# Patient Record
Sex: MALE | Race: WHITE | NOT HISPANIC OR LATINO | Employment: OTHER | URBAN - METROPOLITAN AREA
[De-identification: names, ages, dates, MRNs, and addresses within clinical notes are randomized per-mention and may not be internally consistent; named-entity substitution may affect disease eponyms.]

---

## 2017-02-01 RX ORDER — SILODOSIN 4 MG/1
4 CAPSULE ORAL DAILY
Qty: 90 CAPSULE | Refills: 12 | Status: SHIPPED | OUTPATIENT
Start: 2017-02-01 | End: 2018-01-23

## 2017-02-06 ENCOUNTER — OFFICE VISIT (OUTPATIENT)
Dept: INTERNAL MEDICINE | Facility: CLINIC | Age: 82
End: 2017-02-06
Payer: MEDICARE

## 2017-02-06 VITALS
HEIGHT: 68 IN | HEART RATE: 60 BPM | BODY MASS INDEX: 24.13 KG/M2 | SYSTOLIC BLOOD PRESSURE: 112 MMHG | WEIGHT: 159.19 LBS | DIASTOLIC BLOOD PRESSURE: 68 MMHG

## 2017-02-06 DIAGNOSIS — E03.4 HYPOTHYROIDISM DUE TO ACQUIRED ATROPHY OF THYROID: Primary | ICD-10-CM

## 2017-02-06 DIAGNOSIS — G31.84 MCI (MILD COGNITIVE IMPAIRMENT) WITH MEMORY LOSS: ICD-10-CM

## 2017-02-06 PROCEDURE — 99999 PR PBB SHADOW E&M-EST. PATIENT-LVL III: CPT | Mod: PBBFAC,,, | Performed by: INTERNAL MEDICINE

## 2017-02-06 PROCEDURE — 1126F AMNT PAIN NOTED NONE PRSNT: CPT | Mod: S$GLB,,, | Performed by: INTERNAL MEDICINE

## 2017-02-06 PROCEDURE — 3074F SYST BP LT 130 MM HG: CPT | Mod: S$GLB,,, | Performed by: INTERNAL MEDICINE

## 2017-02-06 PROCEDURE — 1159F MED LIST DOCD IN RCRD: CPT | Mod: S$GLB,,, | Performed by: INTERNAL MEDICINE

## 2017-02-06 PROCEDURE — 99213 OFFICE O/P EST LOW 20 MIN: CPT | Mod: S$GLB,,, | Performed by: INTERNAL MEDICINE

## 2017-02-06 PROCEDURE — 1157F ADVNC CARE PLAN IN RCRD: CPT | Mod: S$GLB,,, | Performed by: INTERNAL MEDICINE

## 2017-02-06 PROCEDURE — 3078F DIAST BP <80 MM HG: CPT | Mod: S$GLB,,, | Performed by: INTERNAL MEDICINE

## 2017-02-06 PROCEDURE — 99499 UNLISTED E&M SERVICE: CPT | Mod: S$GLB,,, | Performed by: INTERNAL MEDICINE

## 2017-02-06 PROCEDURE — 1160F RVW MEDS BY RX/DR IN RCRD: CPT | Mod: S$GLB,,, | Performed by: INTERNAL MEDICINE

## 2017-02-06 NOTE — PROGRESS NOTES
Subjective:       Patient ID: Lavell Coyle is a 84 y.o. male.    Chief Complaint: Follow-up    HPI   Lavell Coyle is a 84 y.o.male with the following  has a past medical history of Allergic blepharitis (5/20/2014); Basal cell cancer; History of mononucleosis; History of osteopenia; Hypothyroidism; Melanoma; Osteopenia; Prostate hypertrophy; Thrombocytopenia (2002); Thrombocytopenia (2002); Thyroid disease; and Thyroid nodule (07/2009). and  has a past surgical history that includes Moh; Skin cancer excision; Rotator cuff repair; Carpal tunnel release; Knee surgery; and Knee arthroscopy. and is currently followed for   Patient Active Problem List   Diagnosis    BPH with urinary obstruction    Thyroid nodule    Osteopenia    Skin cancer    History of melanoma excision    Basal cell cancer    MCI (mild cognitive impairment) with memory loss    Hypothyroidism    Medial meniscus tear    S/P R knee surgery, medial/lateral menisectomy, loose body removal    Range of motion deficit    Decreased strength    Nuclear cataract    Ocular migraine    Blepharitis of both eyes    Bilateral dry eyes   .    Patient is here for a 6 month follow up. He states his memory is still an issue. He has no new problems    Review of Systems   Constitutional: Negative for appetite change, chills, fever and unexpected weight change.   Respiratory: Negative for shortness of breath and wheezing.    Cardiovascular: Negative for chest pain, palpitations and leg swelling.   Gastrointestinal: Negative for abdominal pain, blood in stool, diarrhea, nausea and vomiting.       Objective:      Physical Exam   Constitutional: He is oriented to person, place, and time. He appears well-developed and well-nourished. No distress.   Neck: Carotid bruit is not present. No thyromegaly present.   Cardiovascular: Normal rate, regular rhythm and normal heart sounds.  PMI is not displaced.    Pulmonary/Chest: Effort normal and breath sounds normal.  No respiratory distress.   Abdominal: Soft. Bowel sounds are normal. He exhibits no distension. There is no tenderness.   Musculoskeletal: He exhibits no edema.   Neurological: He is alert and oriented to person, place, and time.       Assessment:       1. Hypothyroidism due to acquired atrophy of thyroid    2. MCI (mild cognitive impairment) with memory loss        Plan:       Lavell was seen today for follow-up.    Diagnoses and all orders for this visit:    Hypothyroidism due to acquired atrophy of thyroid    MCI (mild cognitive impairment) with memory loss      Return in about 6 months (around 8/6/2017) for Annual with cbc, cmp, lipid, tsh.    New Prescriptions    No medications on file       Modified Medications    No medications on file       No orders of the defined types were placed in this encounter.      Labs, studies and consults associated with this visit were reviewed

## 2017-02-06 NOTE — MR AVS SNAPSHOT
[facility]  [address]  [facility] [address]  Phone: [phone]  Fax: [phone]                  Lavell Coyle   2017 2:00 PM   Office Visit    Description:  Male : 1932   Provider:  Rosalva Howard MD   Department:  [facility]           Reason for Visit     Follow-up           Diagnoses this Visit        Comments    Hypothyroidism due to acquired atrophy of thyroid    -  Primary     MCI (mild cognitive impairment) with memory loss                To Do List           Goals (5 Years of Data)     None      Follow-Up and Disposition     Return in about 6 months (around 2017) for Annual with cbc, cmp, lipid, tsh.      [facility]     [facility] Nurse Care Line -  Assistance  Registered nurses in the [facility] provide clinical advisement, health education, appointment booking, and other advisory services.  Call for this free service at [phone].             Medications           Message regarding Medications     Verify the changes and/or additions to your medication regime listed below are the same as discussed with your clinician today.  If any of these changes or additions are incorrect, please notify your healthcare provider.             Verify that the below list of medications is an accurate representation of the medications you are currently taking.  If none reported, the list may be blank. If incorrect, please contact your healthcare provider. Carry this list with you in case of emergency.           Current Medications     calcium citrate-vitamin D3 315-200 mg (CITRACAL+D) 315-200 mg-unit per tablet Take 1 tablet by mouth once daily.    erythromycin (ROMYCIN) ophthalmic ointment Place into both eyes every evening. Apply to lids the first week of the month.    finasteride (PROSCAR) 5 mg tablet Take 1 tablet (5 mg total) by mouth once daily. Only to be refill by Dr. [facility] per patient request     "levothyroxine (SYNTHROID) 50 MCG tablet TAKE 1 TABLET BY MOUTH EVERY DAY    LUXAMEND Crea MADI 1 APPLICATION ON THE SKIN NIGHTLY    saw palmetto 160 MG capsule Take 160 mg by mouth 2 (two) times daily.      silodosin (RAPAFLO) 4 mg Cap capsule Take 1 capsule (4 mg total) by mouth once daily.    UNABLE TO FIND SOLAR RAY MULTIVITAMIN MINERAL    UNABLE TO FIND Ultra omega           Clinical Reference Information           Your Vitals Were     BP Pulse Height Weight BMI    112/68 60 5' 8" (1.727 m) 72.2 kg (159 lb 2.8 oz) 24.2 kg/m2      Blood Pressure          Most Recent Value    BP  112/68      Allergies as of 2/6/2017     No Known Allergies      Immunizations Administered on Date of Encounter - 2/6/2017     None      Language Assistance Services     ATTENTION: Language assistance services are available, free of charge. Please call [phone].      ATENCIÓN: Si habla dimpleañol, tiene a shepherd disposición servicios gratuitos de asistencia lingüística. Llame al [phone].     JORGE LUIS Ý: N?u b?n nói Ti?ng Vi?t, có các d?ch v? h? tr? ngôn ng? mi?n phí dành cho b?n. G?i s? [phone].         [facility] complies with applicable Federal civil rights laws and does not discriminate on the basis of race, color, national origin, age, disability, or sex.        "

## 2017-04-19 ENCOUNTER — OFFICE VISIT (OUTPATIENT)
Dept: INTERNAL MEDICINE | Facility: CLINIC | Age: 82
End: 2017-04-19
Payer: MEDICARE

## 2017-04-19 VITALS
SYSTOLIC BLOOD PRESSURE: 121 MMHG | HEART RATE: 63 BPM | HEIGHT: 67 IN | TEMPERATURE: 98 F | DIASTOLIC BLOOD PRESSURE: 62 MMHG | WEIGHT: 157.19 LBS | BODY MASS INDEX: 24.67 KG/M2

## 2017-04-19 DIAGNOSIS — J32.9 SINUSITIS, UNSPECIFIED CHRONICITY, UNSPECIFIED LOCATION: Primary | ICD-10-CM

## 2017-04-19 PROCEDURE — 99213 OFFICE O/P EST LOW 20 MIN: CPT | Mod: S$GLB,,, | Performed by: PHYSICIAN ASSISTANT

## 2017-04-19 PROCEDURE — 1126F AMNT PAIN NOTED NONE PRSNT: CPT | Mod: S$GLB,,, | Performed by: PHYSICIAN ASSISTANT

## 2017-04-19 PROCEDURE — 1160F RVW MEDS BY RX/DR IN RCRD: CPT | Mod: S$GLB,,, | Performed by: PHYSICIAN ASSISTANT

## 2017-04-19 PROCEDURE — 3074F SYST BP LT 130 MM HG: CPT | Mod: S$GLB,,, | Performed by: PHYSICIAN ASSISTANT

## 2017-04-19 PROCEDURE — 1159F MED LIST DOCD IN RCRD: CPT | Mod: S$GLB,,, | Performed by: PHYSICIAN ASSISTANT

## 2017-04-19 PROCEDURE — 3078F DIAST BP <80 MM HG: CPT | Mod: S$GLB,,, | Performed by: PHYSICIAN ASSISTANT

## 2017-04-19 PROCEDURE — 99999 PR PBB SHADOW E&M-EST. PATIENT-LVL IV: CPT | Mod: PBBFAC,,, | Performed by: PHYSICIAN ASSISTANT

## 2017-04-19 RX ORDER — AMOXICILLIN 875 MG/1
875 TABLET, FILM COATED ORAL 2 TIMES DAILY
Qty: 14 TABLET | Refills: 0 | Status: SHIPPED | OUTPATIENT
Start: 2017-04-19 | End: 2017-04-29

## 2017-04-19 NOTE — PATIENT INSTRUCTIONS
Adult Self-Care for Colds  Colds are caused by viruses. They cant be cured with antibiotics. However, you can relieve symptoms and support your bodys efforts to heal itself. No matter which symptoms you have, be sure to drink plenty of fluids (water or clear soup); stop smoking and drinking alcohol; and get plenty of rest.   Understand a fever  · Take your temperature several times a day. If your fever is 100.4°F (38.0°C) for more than a day, call your doctor.  · Relax, lie down. Go to bed if you want. Just get off your feet and rest. Also, drink plenty of fluids to avoid dehydration.  · Take acetaminophen or a nonsteroidal anti-inflammatory agent (NSAID), such as ibuprofen.  Treat a troubled nose kindly  · Breathe steam or heated humidified air to open blocked nasal passages.  a hot shower or use a vaporizer. Be careful not to get burned by the steam.  · Saline nasal sprays and decongestant tablets help open a stuffy nose. Antihistamines can also help, but they can cause side effects such as drowsiness and drying of the eyes, nose, and mouth.  Soothe a sore throat and cough  · Gargle every 2 hours with 1/4 teaspoon of salt dissolved in 1/2 cup of warm water. Suck on throat lozenges and cough drops to moisten your throat.  · Cough medicines are available but it is unclear how effective they actually are.  · Take acetaminophen or an NSAID, such as ibuprofen to ease throat pain  Ease digestive problems  · Put fluid back into your body. Take frequent sips of clear liquids such as water or broth. Do not drink beverages with a lot of sugar in them, such as juices and sodas. These can make diarrhea worse. Older children and adults can drink sports drinks.  · As your appetite returns, you can resume your normal diet. Ask your doctor whether there are any foods you should avoid.     When to seek medical care  When you first notice symptoms, ask your health care provider if antiviral medications are  appropriate. Antibiotics should not be taken for colds or flu. Also, call your doctor if you have any of the following symptoms or if you arent feeling better after 7 days:  · Shortness of breath  · Pain or pressure in the chest or abdomen  · Worsening symptoms, especially after a period of improvement  · Fever of 100.4°F  (38.0°C) or higher, or fever that doesnt go down with medication  · Sudden dizziness or confusion  · Severe or continued vomiting  · Signs of dehydration, including extreme thirst, dark urine, infrequent urination, dry mouth  · Spotted, red, or very sore throat   Date Last Reviewed: 6/19/2014  © 0519-4060 [employer]. [address] [place] [address], [place] [address]. All rights reserved. This information is not intended as a substitute for professional medical care. Always follow your healthcare professional's instructions.

## 2017-04-19 NOTE — MR AVS SNAPSHOT
[facility]  [address]  [facility] [address]  Phone: [phone]  Fax: [phone]                  Lavell Coyle   2017 3:30 PM   Office Visit    Description:  Male : 1932   Provider:  Andra Brown PA-C   Department:  [facility]           Reason for Visit     Nasal Congestion     Cough           Diagnoses this Visit        Comments    Upper respiratory tract infection, unspecified type    -  Primary            To Do List           Future Appointments        Provider Department Dept Phone    2017 11:30 AM Rosalva Howard MD [facility] [phone]      Goals (5 Years of Data)     None      Follow-Up and Disposition     Return if symptoms worsen or fail to improve.       These Medications        Disp Refills Start End    amoxicillin (AMOXIL) 875 MG tablet 14 tablet 0 2017    Take 1 tablet (875 mg total) by mouth 2 (two) times daily. - Oral    Ph[facility]: [facility] [address] [place] [address] [address] AT [facility] Shawn Berger Ph #: [phone]         [facility]     [facility] On Call Nurse Care Line -  Assistance  Unless otherwise directed by your provider, please contact [facility] On-Call, our nurse care line that is available for  assistance.     Registered nurses in the [facility] provide: appointment scheduling, clinical advisement, health education, and other advisory services.  Call: [phone] (toll free)               Medications           Message regarding Medications     Verify the changes and/or additions to your medication regime listed below are the same as discussed with your clinician today.  If any of these changes or additions are incorrect, please notify your healthcare provider.        START taking these NEW medications        Refills    amoxicillin (AMOXIL) 875 MG tablet 0    Sig: Take 1 tablet (875 mg total) by mouth 2 (two)  "times daily.    Class: Normal    Route: Oral           Verify that the below list of medications is an accurate representation of the medications you are currently taking.  If none reported, the list may be blank. If incorrect, please contact your healthcare provider. Carry this list with you in case of emergency.           Current Medications     calcium citrate-vitamin D3 315-200 mg (CITRACAL+D) 315-200 mg-unit per tablet Take 1 tablet by mouth once daily.    finasteride (PROSCAR) 5 mg tablet Take 1 tablet (5 mg total) by mouth once daily. Only to be refill by Dr[facility] per patient request    levothyroxine (SYNTHROID) 50 MCG tablet TAKE 1 TABLET BY MOUTH EVERY DAY    LUXAMEND Crea MADI 1 APPLICATION ON THE SKIN NIGHTLY    saw palmetto 160 MG capsule Take 160 mg by mouth 2 (two) times daily.      silodosin (RAPAFLO) 4 mg Cap capsule Take 1 capsule (4 mg total) by mouth once daily.    amoxicillin (AMOXIL) 875 MG tablet Take 1 tablet (875 mg total) by mouth 2 (two) times daily.    erythromycin (ROMYCIN) ophthalmic ointment Place into both eyes every evening. Apply to lids the first week of the month.    UNABLE TO FIND SOLAR RAY MULTIVITAMIN MINERAL    UNABLE TO FIND Ultra omega           Clinical Reference Information           Your Vitals Were     BP Pulse Temp Height Weight BMI    121/62 (BP Location: Left arm, Patient Position: Sitting, BP Method: Manual) 63 97.6 °F (36.4 °C) 5' 7" (1.702 m) 71.3 kg (157 lb 3 oz) 24.62 kg/m2      Blood Pressure          Most Recent Value    BP  121/62      Allergies as of 4/19/2017     No Known Allergies      Immunizations Administered on Date of Encounter - 4/19/2017     None      Instructions      Adult Self-Care for Colds  Colds are caused by viruses. They cant be cured with antibiotics. However, you can relieve symptoms and support your bodys efforts to heal itself. No matter which symptoms you have, be sure to drink plenty of fluids (water or clear soup); stop " smoking and drinking alcohol; and get plenty of rest.   Understand a fever  · Take your temperature several times a day. If your fever is 100.4°F (38.0°C) for more than a day, call your doctor.  · Relax, lie down. Go to bed if you want. Just get off your feet and rest. Also, drink plenty of fluids to avoid dehydration.  · Take acetaminophen or a nonsteroidal anti-inflammatory agent (NSAID), such as ibuprofen.  Treat a troubled nose kindly  · Breathe steam or heated humidified air to open blocked nasal passages.  a hot shower or use a vaporizer. Be careful not to get burned by the steam.  · Saline nasal sprays and decongestant tablets help open a stuffy nose. Antihistamines can also help, but they can cause side effects such as drowsiness and drying of the eyes, nose, and mouth.  Soothe a sore throat and cough  · Gargle every 2 hours with 1/4 teaspoon of salt dissolved in 1/2 cup of warm water. Suck on throat lozenges and cough drops to moisten your throat.  · Cough medicines are available but it is unclear how effective they actually are.  · Take acetaminophen or an NSAID, such as ibuprofen to ease throat pain  Ease digestive problems  · Put fluid back into your body. Take frequent sips of clear liquids such as water or broth. Do not drink beverages with a lot of sugar in them, such as juices and sodas. These can make diarrhea worse. Older children and adults can drink sports drinks.  · As your appetite returns, you can resume your normal diet. Ask your doctor whether there are any foods you should avoid.     When to seek medical care  When you first notice symptoms, ask your health care provider if antiviral medications are appropriate. Antibiotics should not be taken for colds or flu. Also, call your doctor if you have any of the following symptoms or if you arent feeling better after 7 days:  · Shortness of breath  · Pain or pressure in the chest or abdomen  · Worsening symptoms, especially after a  period of improvement  · Fever of 100.4°F  (38.0°C) or higher, or fever that doesnt go down with medication  · Sudden dizziness or confusion  · Severe or continued vomiting  · Signs of dehydration, including extreme thirst, dark urine, infrequent urination, dry mouth  · Spotted, red, or very sore throat   Date Last Reviewed: 6/19/2014  © 4097-1380 [employer]. [address] [place] [address]. All rights reserved. This information is not intended as a substitute for professional medical care. Always follow your healthcare professional's instructions.             Language Assistance Services     ATTENTION: Language assistance services are available, free of charge. Please call [phone].      ATENCIÓN: Si angelica sharpe, tiene a shepherd disposición servicios gratuitos de asistencia lingüística. Llame al [phone].     JORGE LUIS Ý: N?u b?n nói Ti?ng Vi?t, có các d?ch v? h? tr? ngôn ng? mi?n phí dành cho b?n. G?i s? [phone].         [facility] complies with applicable Federal civil rights laws and does not discriminate on the basis of race, color, national origin, age, disability, or sex.

## 2017-04-19 NOTE — PROGRESS NOTES
Subjective:       Patient ID: Lavell Coyle is a 85 y.o. male.        Chief Complaint: Nasal Congestion and Cough (yellow mucus x2weeks)    HPI Comments: Lavell Coyle is an established patient of Rosalva Howard MD here today for    Nasal congestion, cough productive of yellow mucus.  Sx x 2 weeks or more.  Also post nasal drip.  Sx are persistent, not really improving.    No chest pain, shortness of breath, fever.           Review of Systems   Constitutional: Negative for appetite change, chills, fatigue and fever.   HENT: Positive for congestion and postnasal drip. Negative for sore throat.    Eyes: Negative for visual disturbance.   Respiratory: Positive for cough. Negative for chest tightness and shortness of breath.    Cardiovascular: Negative for chest pain, palpitations and leg swelling.   Gastrointestinal: Negative for abdominal pain, blood in stool, constipation, diarrhea, nausea and vomiting.   Genitourinary: Negative for dysuria, frequency, hematuria and urgency.   Musculoskeletal: Negative for arthralgias and back pain.   Skin: Negative for rash.   Neurological: Negative for dizziness, syncope, weakness and headaches.   Psychiatric/Behavioral: Negative for dysphoric mood and sleep disturbance. The patient is not nervous/anxious.        Objective:      Physical Exam   Constitutional: He appears well-developed and well-nourished.   HENT:   Head: Normocephalic.   Right Ear: External ear normal.   Left Ear: External ear normal.   Nose: Mucosal edema and rhinorrhea present. Right sinus exhibits no maxillary sinus tenderness and no frontal sinus tenderness. Left sinus exhibits no maxillary sinus tenderness and no frontal sinus tenderness.   Mouth/Throat: Oropharynx is clear and moist.   Eyes: Pupils are equal, round, and reactive to light.   Cardiovascular: Normal rate, regular rhythm and normal heart sounds.  Exam reveals no gallop and no friction rub.    No murmur heard.  Pulmonary/Chest: Effort normal  "and breath sounds normal. No respiratory distress.   Abdominal: Soft. There is no tenderness.   Musculoskeletal: He exhibits no edema.   Neurological: He is alert.   Skin: Skin is warm and dry.   Psychiatric: He has a normal mood and affect.   Nursing note and vitals reviewed.      Assessment:       1. Sinusitis, unspecified chronicity, unspecified location        Plan:       Lavell was seen today for nasal congestion and cough.    Diagnoses and all orders for this visit:    Sinusitis, unspecified chronicity, unspecified location  -     amoxicillin (AMOXIL) 875 MG tablet; Take 1 tablet (875 mg total) by mouth 2 (two) times daily.    Drink plenty of fluids, get lots of rest, and follow-up poor results.      Pt has been given instructions populated from [employer] database and has verbalized understanding of the after visit summary and information contained wherein.    Follow up with a primary care provider. May go to ER for acute shortness of breath, lightheadedness, fever, or any other emergent complaints or changes in condition.    "This note will be shared with the patient"    Future Appointments  Date Time Provider Department Center   6/19/2017 11:30 AM Rosalva Howard MD [facility]               "

## 2017-05-02 ENCOUNTER — NURSE TRIAGE (OUTPATIENT)
Dept: ADMINISTRATIVE | Facility: CLINIC | Age: 82
End: 2017-05-02

## 2017-05-02 NOTE — TELEPHONE ENCOUNTER
"    Reason for Disposition   Dizziness not present now, but is a chronic symptom (recurrent or ongoing AND lasting > 4 weeks)    Protocols used:  DIZZINESS-A-OH    Patient states he has been having "dizzy spells" and would like to be seen only by his pcp. He is not having any symptoms at time of call. Last episode of dizziness was last night. He denies feeling like he was going to fall or pass out at time of episode and is still going about normal activities. Also denies sob, chest pain, palpitations. He states he feels like he is drinking plenty of fluids. Was able to schedule for within the next two weeks but he would like to be seen sooner if possible. Please contact caller with any further care advice.   "

## 2017-05-04 NOTE — TELEPHONE ENCOUNTER
Called pt to see if he would like to be seen sooner than 5/17/17 for his dizziness. Left message for pt to return office call.

## 2017-05-17 ENCOUNTER — OFFICE VISIT (OUTPATIENT)
Dept: INTERNAL MEDICINE | Facility: CLINIC | Age: 82
End: 2017-05-17
Payer: MEDICARE

## 2017-05-17 ENCOUNTER — LAB VISIT (OUTPATIENT)
Dept: LAB | Facility: HOSPITAL | Age: 82
End: 2017-05-17
Attending: INTERNAL MEDICINE
Payer: MEDICARE

## 2017-05-17 VITALS
HEART RATE: 61 BPM | DIASTOLIC BLOOD PRESSURE: 64 MMHG | SYSTOLIC BLOOD PRESSURE: 128 MMHG | OXYGEN SATURATION: 97 % | WEIGHT: 158.75 LBS | BODY MASS INDEX: 24.92 KG/M2 | HEIGHT: 67 IN

## 2017-05-17 DIAGNOSIS — E53.8 B12 NUTRITIONAL DEFICIENCY: ICD-10-CM

## 2017-05-17 DIAGNOSIS — G31.84 MCI (MILD COGNITIVE IMPAIRMENT) WITH MEMORY LOSS: ICD-10-CM

## 2017-05-17 DIAGNOSIS — R79.89 HYPOTHYROXINEMIA: ICD-10-CM

## 2017-05-17 DIAGNOSIS — G31.84 MCI (MILD COGNITIVE IMPAIRMENT) WITH MEMORY LOSS: Primary | ICD-10-CM

## 2017-05-17 DIAGNOSIS — E78.00 PURE HYPERCHOLESTEROLEMIA: ICD-10-CM

## 2017-05-17 LAB
ALBUMIN SERPL BCP-MCNC: 3.3 G/DL
ALP SERPL-CCNC: 61 U/L
ALT SERPL W/O P-5'-P-CCNC: 12 U/L
ANION GAP SERPL CALC-SCNC: 6 MMOL/L
AST SERPL-CCNC: 22 U/L
BASOPHILS # BLD AUTO: 0.06 K/UL
BASOPHILS NFR BLD: 0.7 %
BILIRUB SERPL-MCNC: 0.4 MG/DL
BUN SERPL-MCNC: 27 MG/DL
CALCIUM SERPL-MCNC: 9.1 MG/DL
CHLORIDE SERPL-SCNC: 106 MMOL/L
CHOLEST/HDLC SERPL: 3.2 {RATIO}
CO2 SERPL-SCNC: 26 MMOL/L
CREAT SERPL-MCNC: 1.1 MG/DL
DIFFERENTIAL METHOD: ABNORMAL
EOSINOPHIL # BLD AUTO: 0.1 K/UL
EOSINOPHIL NFR BLD: 1.7 %
ERYTHROCYTE [DISTWIDTH] IN BLOOD BY AUTOMATED COUNT: 13.6 %
EST. GFR  (AFRICAN AMERICAN): >60 ML/MIN/1.73 M^2
EST. GFR  (NON AFRICAN AMERICAN): >60 ML/MIN/1.73 M^2
GLUCOSE SERPL-MCNC: 112 MG/DL
HCT VFR BLD AUTO: 40 %
HDL/CHOLESTEROL RATIO: 31.5 %
HDLC SERPL-MCNC: 181 MG/DL
HDLC SERPL-MCNC: 57 MG/DL
HGB BLD-MCNC: 13.7 G/DL
LDLC SERPL CALC-MCNC: 103.8 MG/DL
LYMPHOCYTES # BLD AUTO: 1.2 K/UL
LYMPHOCYTES NFR BLD: 15.3 %
MCH RBC QN AUTO: 32.1 PG
MCHC RBC AUTO-ENTMCNC: 34.3 %
MCV RBC AUTO: 94 FL
MONOCYTES # BLD AUTO: 0.9 K/UL
MONOCYTES NFR BLD: 11.7 %
NEUTROPHILS # BLD AUTO: 5.6 K/UL
NEUTROPHILS NFR BLD: 70.2 %
NONHDLC SERPL-MCNC: 124 MG/DL
PLATELET # BLD AUTO: 157 K/UL
PMV BLD AUTO: 10.2 FL
POTASSIUM SERPL-SCNC: 5.4 MMOL/L
PROT SERPL-MCNC: 6.1 G/DL
RBC # BLD AUTO: 4.27 M/UL
SODIUM SERPL-SCNC: 138 MMOL/L
TRIGL SERPL-MCNC: 101 MG/DL
TSH SERPL DL<=0.005 MIU/L-ACNC: 2.41 UIU/ML
VIT B12 SERPL-MCNC: 581 PG/ML
WBC # BLD AUTO: 8.03 K/UL

## 2017-05-17 PROCEDURE — 99214 OFFICE O/P EST MOD 30 MIN: CPT | Mod: S$GLB,,, | Performed by: INTERNAL MEDICINE

## 2017-05-17 PROCEDURE — 84443 ASSAY THYROID STIM HORMONE: CPT

## 2017-05-17 PROCEDURE — 3078F DIAST BP <80 MM HG: CPT | Mod: S$GLB,,, | Performed by: INTERNAL MEDICINE

## 2017-05-17 PROCEDURE — 1159F MED LIST DOCD IN RCRD: CPT | Mod: S$GLB,,, | Performed by: INTERNAL MEDICINE

## 2017-05-17 PROCEDURE — 3074F SYST BP LT 130 MM HG: CPT | Mod: S$GLB,,, | Performed by: INTERNAL MEDICINE

## 2017-05-17 PROCEDURE — 99999 PR PBB SHADOW E&M-EST. PATIENT-LVL III: CPT | Mod: PBBFAC,,, | Performed by: INTERNAL MEDICINE

## 2017-05-17 PROCEDURE — 80053 COMPREHEN METABOLIC PANEL: CPT

## 2017-05-17 PROCEDURE — 80061 LIPID PANEL: CPT

## 2017-05-17 PROCEDURE — 85025 COMPLETE CBC W/AUTO DIFF WBC: CPT

## 2017-05-17 PROCEDURE — 36415 COLL VENOUS BLD VENIPUNCTURE: CPT

## 2017-05-17 PROCEDURE — 82607 VITAMIN B-12: CPT

## 2017-05-17 PROCEDURE — 1126F AMNT PAIN NOTED NONE PRSNT: CPT | Mod: S$GLB,,, | Performed by: INTERNAL MEDICINE

## 2017-05-17 NOTE — PROGRESS NOTES
Subjective:       Patient ID: Lavell Coyle is a 85 y.o. male.    Chief Complaint: Follow-up    HPI   Patient was here for a follow up from  on 4/19/2017 for dizziness and patient does not have any further episodes.    He continues to have forgetfulness.  He last saw neurology approximately 2 years ago.  He says that besides his forgetfulness he has difficulty with driving certain routes that he is not familiar.  He has not considered a medication that may be tried to help him in the past.  He is concerned about what side effects there might be.  He is able to live alone and actually has no problems living in his home.  [facility].  He is getting some exercise.  Review of Systems   Constitutional: Negative for appetite change, chills, fever and unexpected weight change.   Respiratory: Negative for shortness of breath and wheezing.    Cardiovascular: Negative for chest pain, palpitations and leg swelling.   Gastrointestinal: Negative for abdominal pain, blood in stool, diarrhea, nausea and vomiting.       Objective:      Physical Exam   Constitutional: He is oriented to person, place, and time. He appears well-developed and well-nourished. No distress.   Neck: Carotid bruit is not present. No thyromegaly present.   Cardiovascular: Normal rate, regular rhythm and normal heart sounds.  PMI is not displaced.    Pulmonary/Chest: Effort normal and breath sounds normal. No respiratory distress.   Abdominal: Soft. Bowel sounds are normal. He exhibits no distension. There is no tenderness.   Musculoskeletal: He exhibits no edema.   Neurological: He is alert and oriented to person, place, and time.       Assessment:       1. MCI (mild cognitive impairment) with memory loss        Plan:       Lavell was seen today for follow-up.    Diagnoses and all orders for this visit:    MCI (mild cognitive impairment) with memory loss  -     Ambulatory consult to Neurology  -     Vitamin B12;  Future    B12 nutritional deficiency  -     Vitamin B12; Future      Return in about 6 months (around 11/17/2017) for Follow up memory.    New Prescriptions    No medications on file       Modified Medications    No medications on file       Orders Placed This Encounter   Procedures    Vitamin B12     Standing Status:   Future     Standing Expiration Date:   7/16/2018    Ambulatory consult to Neurology     Referral Priority:   Routine     Referral Type:   Consultation     Referral Reason:   Specialty Services Required     Requested Specialty:   Neurology     Number of Visits Requested:   1       Labs, studies and consults associated with this visit were reviewed

## 2017-05-17 NOTE — MR AVS SNAPSHOT
[facility] Internal Medicine  [address]  [facility] [address]  Phone: [phone]  Fax: [phone]                  Lavell Coyle   2017 1:00 PM   Office Visit    Description:  Male : 1932   Provider:  Rosalva Howard MD   Department:  [facility] - Internal Medicine           Reason for Visit     Follow-up           Diagnoses this Visit        Comments    MCI (mild cognitive impairment) with memory loss    -  Primary     B12 nutritional deficiency                To Do List           Future Appointments        Provider Department Dept Phone    2017 2:00 PM LAB, APPOINTMENT [facility] [phone]    2017 11:30 AM Rosalva Howard MD [facility] [phone]      Goals (5 Years of Data)     None      Follow-Up and Disposition     Return in about 6 months (around 2017) for Follow up memory.      [facility]     [facility] Care Line -  Assistance  Unless otherwise directed by your provider, please contact [facility], our nurse care line that is available for  assistance.     Registered nurses in the [facility] provide: appointment scheduling, clinical advisement, health education, and other advisory services.  Call: [phone] (toll free)               Medications           Message regarding Medications     Verify the changes and/or additions to your medication regime listed below are the same as discussed with your clinician today.  If any of these changes or additions are incorrect, please notify your healthcare provider.             Verify that the below list of medications is an accurate representation of the medications you are currently taking.  If none reported, the list may be blank. If incorrect, please contact your healthcare provider. Carry this list with you in case of emergency.           Current Medications     calcium citrate-vitamin D3 315-200 mg (CITRACAL+D)  "315-200 mg-unit per tablet Take 1 tablet by mouth once daily.    erythromycin (ROMYCIN) ophthalmic ointment Place into both eyes every evening. Apply to lids the first week of the month.    finasteride (PROSCAR) 5 mg tablet Take 1 tablet (5 mg total) by mouth once daily. Only to be refill by Dr. [facility] per patient request    levothyroxine (SYNTHROID) 50 MCG tablet TAKE 1 TABLET BY MOUTH EVERY DAY    LUXAMEND Crea MADI 1 APPLICATION ON THE SKIN NIGHTLY    saw palmetto 160 MG capsule Take 160 mg by mouth 2 (two) times daily.      silodosin (RAPAFLO) 4 mg Cap capsule Take 1 capsule (4 mg total) by mouth once daily.    UNABLE TO FIND SOLAR RAY MULTIVITAMIN MINERAL    UNABLE TO FIND Ultra omega           Clinical Reference Information           Your Vitals Were     BP Pulse Height Weight SpO2 BMI    128/64 61 5' 7" (1.702 m) 72 kg (158 lb 11.7 oz) 97% 24.86 kg/m2      Blood Pressure          Most Recent Value    BP  128/64      Allergies as of 5/17/2017     No Known Allergies      Immunizations Administered on Date of Encounter - 5/17/2017     None      Orders Placed During Today's Visit      Normal Orders This Visit    Ambulatory consult to Neurology     Future Labs/Procedures Expected by Expires    Vitamin B12  5/17/2017 7/16/2018      Language Assistance Services     ATTENTION: Language assistance services are available, free of charge. Please call [phone].      ATENCIÓN: Si angelica sharpe, tiene a shepherd disposición servicios gratuitos de asistencia lingüística. Llame al [phone].     CHÚ Ý: N?u b?n nói Ti?ng Vi?t, có các d?ch v? h? tr? ngôn ng? mi?n phí dành cho b?n. G?i s? [phone].         [facility] complies with applicable Federal civil rights laws and does not discriminate on the basis of race, color, national origin, age, disability, or sex.        "

## 2017-05-24 ENCOUNTER — TELEPHONE (OUTPATIENT)
Dept: INTERNAL MEDICINE | Facility: CLINIC | Age: 82
End: 2017-05-24

## 2017-05-24 DIAGNOSIS — D64.9 ANEMIA, UNSPECIFIED TYPE: Primary | ICD-10-CM

## 2017-05-24 NOTE — TELEPHONE ENCOUNTER
Procedures Ordered This Visit    CBC auto differential              Comprehensive metabolic panel              Ferritin              Iron and TIBC               Please schedule these labs prior to his next appt

## 2017-05-25 ENCOUNTER — OFFICE VISIT (OUTPATIENT)
Dept: NEUROLOGY | Facility: CLINIC | Age: 82
End: 2017-05-25
Payer: MEDICARE

## 2017-05-25 VITALS
DIASTOLIC BLOOD PRESSURE: 79 MMHG | WEIGHT: 157.63 LBS | HEART RATE: 61 BPM | HEIGHT: 67 IN | BODY MASS INDEX: 24.74 KG/M2 | SYSTOLIC BLOOD PRESSURE: 127 MMHG

## 2017-05-25 DIAGNOSIS — R41.3 MEMORY LOSS: ICD-10-CM

## 2017-05-25 DIAGNOSIS — G31.84 MCI (MILD COGNITIVE IMPAIRMENT) WITH MEMORY LOSS: Primary | ICD-10-CM

## 2017-05-25 PROCEDURE — 1126F AMNT PAIN NOTED NONE PRSNT: CPT | Mod: S$GLB,,, | Performed by: PSYCHIATRY & NEUROLOGY

## 2017-05-25 PROCEDURE — 99214 OFFICE O/P EST MOD 30 MIN: CPT | Mod: S$GLB,,, | Performed by: PSYCHIATRY & NEUROLOGY

## 2017-05-25 PROCEDURE — 99999 PR PBB SHADOW E&M-EST. PATIENT-LVL III: CPT | Mod: PBBFAC,,, | Performed by: PSYCHIATRY & NEUROLOGY

## 2017-05-25 PROCEDURE — 1157F ADVNC CARE PLAN IN RCRD: CPT | Mod: 8P,S$GLB,, | Performed by: PSYCHIATRY & NEUROLOGY

## 2017-05-25 PROCEDURE — 1159F MED LIST DOCD IN RCRD: CPT | Mod: S$GLB,,, | Performed by: PSYCHIATRY & NEUROLOGY

## 2017-05-25 RX ORDER — MEMANTINE HYDROCHLORIDE 5 MG/1
5 TABLET ORAL DAILY
Qty: 30 TABLET | Refills: 11 | Status: SHIPPED | OUTPATIENT
Start: 2017-05-25 | End: 2017-09-27 | Stop reason: SDUPTHER

## 2017-05-25 NOTE — PATIENT INSTRUCTIONS
Discussed with patient.  Will initiate Namenda 5 mg daily in the morning.  Continue present level of activities including exercises.  Also advised patient regarding getting involved in activities such as visiting museums or listening to music.  Getting a pet such as a cat, could also help.

## 2017-05-25 NOTE — PROGRESS NOTES
Subjective:       Patient ID: Lavell Coyle is a 85 y.o. male.    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  He was last seen by me in June 2015. He has occasional difficulty with retention of recent information and with names and words, however does have delayed recall. He lives alone following his divorce many years ago. The patient reports that he continues to live alone without any problems and is able to manage his finances and medications. He exercises on a daily basis. He does report that he does not socialize very much and he spends a lot of time at home watching TV. Sleep and appetite is good. There is no history of head trauma. An MRI scan of the brain done in 2012 was unremarkable. There was a tiny meningioma, arising from the falx in the inferior supraorbital frontal region and parietal region in the midline. This was an incidental finding.  He is able to take care of his day-to-day activities however does report that over the past several months he has occasionally missed his turn and has to turn around and come back.  He does report a decrease in his attention span and concentration.       Review of Systems  Review of Systems   Constitutional: Negative.    HENT: Negative.  Negative for hearing loss.    Eyes: Negative.  Negative for visual disturbance.   Respiratory: Negative.  Negative for shortness of breath.    Cardiovascular: Negative.  Negative for chest pain and palpitations.   Gastrointestinal: Negative.    Endocrine: Negative.    Genitourinary: Negative.    Musculoskeletal: Negative.  Negative for back pain and gait problem.   Skin: Negative.    Allergic/Immunologic: Negative.    Neurological: Negative.  Negative for dizziness, tremors, seizures, syncope, speech difficulty, weakness, numbness and headaches.    Hematological: Negative.    Psychiatric/Behavioral: Positive for decreased concentration. Negative for sleep disturbance.       Objective:      Neurologic Exam     Cranial Nerves     CN III, IV, VI   Pupils are equal, round, and reactive to light.  Extraocular motions are normal.     Motor Exam     Strength   Strength 5/5 throughout.       Physical Exam   Constitutional: He appears well-developed and well-nourished.   HENT:   Head: Normocephalic and atraumatic.   Right Ear: Hearing normal.   Left Ear: Hearing normal.   Eyes: EOM are normal. Pupils are equal, round, and reactive to light.   Fundus examination shows sharp disc margins.   Neck: Normal range of motion. Neck supple. Carotid bruit is not present.   Neurological: He is alert. He has normal strength and normal reflexes. He displays no atrophy. No cranial nerve deficit (Visual fields at bedside testing essentially normal.  No facial asymmetry noted with facial movements and sensory exam being normal/symmetrical.  Corneals/gag reflexes normal.  Tongue & palate movements normal.  Hearing unimpaired.  Shoulder shrug was norm) or sensory deficit. He exhibits normal muscle tone. He displays a negative Romberg sign. Coordination and gait normal. He displays no Babinski's sign on the right side. He displays no Babinski's sign on the left side.   Mental status examination: Patient is fully oriented and able to give an adequate history.  Recall of recent and past information is good.  Immediate recall is normal.  Attention span and concentration was normal.  No difficulty with spelling backwards and serial sevens.  Judgment and insight is normal.  Language functions are intact with no evidence of aphasia or dysarthria.  Comprehension is unimpaired.  Affect is appropriate, mood was even.  No thought disorder is noted.         Assessment:        1. MCI (mild cognitive impairment) with memory loss    2. Memory loss            Plan:       Discussed with patient.  Will  initiate Namenda 5 mg daily in the morning.  Continue present level of activities including exercises.  Also advised patient regarding getting involved in activities such as visiting museums or listening to music.  Getting a pet such as a cat, could also help.  If stable he will follow-up in one year.

## 2017-05-25 NOTE — LETTER
May 25, 2017      Rosalva Howard MD  [address]  [facility] [address]           [facility] Neurology  [address]  [facility] [address]  Phone: [phone]  Fax: [phone]          Patient: Lavell Coyle   MR Number: [id number]   YOB: 1932   Date of Visit: 5/25/2017       Dear Dr. Rosalva Howard:    Thank you for referring Lavell Coyle to me for evaluation. Attached you will find relevant portions of my assessment and plan of care.    If you have questions, please do not hesitate to call me. I look forward to following Lavell Coyle along with you.    Sincerely,    Pepe Hicks MD    Enclosure  CC:  No Recipients    If you would like to receive this communication electronically, please contact [email or web address] or [phone] to request more information on [facility] Link access.    For providers and/or their staff who would like to refer a patient to [facility], please contact us through our one-stop-shop provider referral line, [facility], at [phone].    If you feel you have received this communication in error or would no longer like to receive these types of communications, please e-mail [email or web address]

## 2017-05-28 ENCOUNTER — HOSPITAL ENCOUNTER (EMERGENCY)
Facility: HOSPITAL | Age: 82
Discharge: HOME | End: 2017-05-28
Attending: EMERGENCY MEDICINE
Payer: MEDICARE

## 2017-05-28 VITALS
SYSTOLIC BLOOD PRESSURE: 163 MMHG | BODY MASS INDEX: 22.73 KG/M2 | WEIGHT: 150 LBS | OXYGEN SATURATION: 96 % | TEMPERATURE: 98 F | DIASTOLIC BLOOD PRESSURE: 75 MMHG | HEIGHT: 68 IN | RESPIRATION RATE: 16 BRPM | HEART RATE: 80 BPM
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
## 2017-05-28 DIAGNOSIS — S63.642A SPRAIN OF METACARPOPHALANGEAL (MCP) JOINT OF LEFT THUMB, INITIAL ENCOUNTER: Primary | ICD-10-CM

## 2017-05-28 PROCEDURE — 99283 EMERGENCY DEPT VISIT LOW MDM: CPT | Mod: ,,, | Performed by: EMERGENCY MEDICINE

## 2017-05-28 PROCEDURE — 29130 APPL FINGER SPLINT STATIC: CPT

## 2017-05-28 PROCEDURE — 99283 EMERGENCY DEPT VISIT LOW MDM: CPT

## 2017-05-29 ENCOUNTER — TELEPHONE (OUTPATIENT)
Dept: INTERNAL MEDICINE | Facility: CLINIC | Age: 82
End: 2017-05-29

## 2017-05-29 NOTE — ED TRIAGE NOTES
Lavell Coyle, a 85 y.o. male presents to the ED after mechanical fall at gym. Pt reports hitting left cheek and left hand. Pt reports hand pain. Swelling and bruising to left hand noted. Pt denies LOC. Ice applied to hand.      Chief Complaint   Patient presents with    Fall     slipped at the gym injuring his left facial cheek, left knee and left thumb. Denies LOC , no jaw or dental pain .     Review of patient's allergies indicates:  No Known Allergies  Past Medical History:   Diagnosis Date    Allergic blepharitis 5/20/2014    Basal cell cancer     History of mononucleosis     pupura    History of osteopenia     Hypothyroidism     Melanoma     Osteopenia     Prostate hypertrophy     Thrombocytopenia 2002    Thrombocytopenia 2002    Thyroid disease     Thyroid nodule 07/2009    left nodule      Adult Physical Assessment  LOC: Lavell Coyle, 85 y.o. male verified via two identifiers.  The patient is awake, alert, oriented and speaking appropriately at this time.  APPEARANCE: Patient resting comfortably and appears to be in no acute distress at this time. Patient is clean and well groomed, patient's clothing is properly fastened.  SKIN:The skin is warm and dry, color consistent with ethnicity, patient has normal skin turgor and moist mucus membranes, skin intact, no breakdown noted. Bruising noted to left thumb and hand.  MUSCULOSKELETAL: Patient moving all extremities well, no obvious deformities noted. Swelling noted to left hand.  RESPIRATORY: Airway is open and patent, respirations are spontaneous, patient has a normal effort and rate, no accessory muscle use noted.  CARDIAC: Patient has a normal rate and rhythm, no periphreal edema noted in any extremity, capillary refill < 3 seconds in all extremities  ABDOMEN: Soft and non tender to palpation, no abdominal distention noted. Bowel sounds present in all four quadrants.  NEUROLOGIC: Eyes open spontaneously, behavior appropriate to situation,  follows commands, facial expression symmetrical, bilateral hand grasp equal and even, purposeful motor response noted, normal sensation in all extremities when touched with a finger.

## 2017-05-29 NOTE — TELEPHONE ENCOUNTER
----- Message from Ele Amor sent at 5/29/2017 12:26 PM CDT -----  Contact: self/[phone]  Pt called in regards to getting a er f/u appointment. Pt did not want to see anyone else.        Please advise

## 2017-05-29 NOTE — ED PROVIDER NOTES
Encounter Date: 5/28/2017       History     Chief Complaint   Patient presents with    Fall     slipped at the gym injuring his left facial cheek, left knee and left thumb. Denies LOC , no jaw or dental pain .     Review of patient's allergies indicates:  No Known Allergies  This is an 85-year-old male with a past medical history of BPH, thrombocytopenia, osteopenia who presents to the ED with a chief complaint of fall.  Patient reports having a mechanical trip and fall while exercising in the gym.  He landed on his left hand and reports striking his cheek on something.  He denies loss of consciousness, neck or back pain.  He reports pain, swelling, and bruising to the left hand and is concerned that he may have fractured it.  He denies fever, chills, headache, vision changes, chest pain, shortness of breath, nausea/vomiting, abdominal pain, changes in urination, extremity weakness or numbness.          Past Medical History:   Diagnosis Date    Allergic blepharitis 5/20/2014    Basal cell cancer     History of mononucleosis     pupura    History of osteopenia     Hypothyroidism     Melanoma     Osteopenia     Prostate hypertrophy     Thrombocytopenia 2002    Thrombocytopenia 2002    Thyroid disease     Thyroid nodule 07/2009    left nodule     Past Surgical History:   Procedure Laterality Date    CARPAL TUNNEL RELEASE      left    KNEE ARTHROSCOPY      KNEE SURGERY      Moh      surgery    ROTATOR CUFF REPAIR      right    SKIN CANCER EXCISION       Family History   Problem Relation Age of Onset    Cancer Other     Alcohol abuse Father     Prostate cancer Neg Hx     Kidney disease Neg Hx      Social History   Substance Use Topics    Smoking status: Never Smoker    Smokeless tobacco: Never Used    Alcohol use 0.6 oz/week     1 Glasses of wine per week      Comment: ONCE DAILY     Review of Systems   Constitutional: Negative for chills and fever.   HENT: Negative for sore throat.     Respiratory: Negative for shortness of breath.    Cardiovascular: Negative for chest pain.   Gastrointestinal: Negative for nausea.   Genitourinary: Negative for dysuria.   Musculoskeletal: Positive for arthralgias and joint swelling. Negative for back pain and neck pain.   Skin: Negative for rash.   Neurological: Negative for weakness and numbness.   Hematological: Does not bruise/bleed easily.       Physical Exam     Initial Vitals [05/28/17 1756]   BP Pulse Resp Temp SpO2   (!) 163/75 80 16 97.9 °F (36.6 °C) 96 %     Physical Exam    Constitutional: He appears well-developed and well-nourished. No distress.   HENT:   Head: Normocephalic. Head is without raccoon's eyes and without Garcia's sign.   Right Ear: Tympanic membrane and ear canal normal.   Left Ear: Tympanic membrane and ear canal normal.   Eyes: Conjunctivae and EOM are normal. Pupils are equal, round, and reactive to light.   Cardiovascular: Normal rate, regular rhythm and normal heart sounds.   Pulmonary/Chest: Breath sounds normal. No respiratory distress. He has no wheezes. He has no rhonchi. He has no rales.   Abdominal: Soft. Bowel sounds are normal. There is no tenderness.   Musculoskeletal:        Cervical back: Normal. He exhibits normal range of motion and no tenderness.        Lumbar back: He exhibits normal range of motion and no tenderness.        Left hand: He exhibits tenderness, bony tenderness and swelling. He exhibits normal range of motion and normal capillary refill. Normal sensation noted. Normal strength noted.   Left thumb with edema and ecchymosis over the base of the first metacarpal.  He has full range of motion and normal strength against opposition.  His cap refill is brisk.  Distal sensation intact.  No open wounds.   Neurological: He is alert and oriented to person, place, and time.   Skin: Skin is warm and dry. No rash noted.         ED Course   Procedures  Labs Reviewed - No data to display      Imaging Results           X-Ray Hand 3 view Left (Final result)  Result time 05/28/17 21:21:28    Final result by Santo Cummings MD (05/28/17 21:21:28)                 Impression:     No acute fracture identified.      Electronically signed by: Dr. Santo Cummings MD  Date:     05/28/17  Time:    21:21              Narrative:    Comparison: None.    Findings:3 view examination.Screw in place the level of the scaphoid. The alignment is within normal limits.  No displaced fractures identified.  No evidence of lytic or blastic lesions.Soft tissues are unremarkable. Joint spaces are unremarkable.                                   Medical Decision Making:   History:   Old Medical Records: I decided to obtain old medical records.       APC / Resident Notes:   85-year-old male presents with fall and hand injury.  On exam he is afebrile and nontoxic.  HEENT exam is benign.  Neck is supple with no midline bony tenderness.  The left thumb is swollen with ecchymosis and bony tenderness over the base of the first metacarpal.  Full range of motion and motor and sensory intact.  Musculoskeletal survey including palpation of the facial bones and chest wall nontender to palpation.  EOMI, PERRLA.    DDX includes but is not limited to hand fracture, contusion, sprain, dislocation.    X-ray with no acute fracture identified.  A thumb spica splint is placed for comfort.  Patient advised on rest, ice, elevation, and compression.  Advised a short course of over-the-counter Tylenol or ibuprofen as directed for pain.  Patient reports taking Advil PM nightly for long term insomnia.  Discussed the risk of daily long-term NSAID use and advised patient against this.  We'll refer him to his PCP for further discussion.  Return to ED precautions were given.  He is stable for discharge. I discussed the care of this patient with my supervising MD.               ED Course     Clinical Impression:   The encounter diagnosis was Sprain of metacarpophalangeal (MCP) joint of  left thumb, initial encounter.    Disposition:   Disposition: Discharged  Condition: Stable       WOJCIECH Barraza  05/29/17 0141

## 2017-06-14 ENCOUNTER — LAB VISIT (OUTPATIENT)
Dept: LAB | Facility: HOSPITAL | Age: 82
End: 2017-06-14
Attending: INTERNAL MEDICINE
Payer: MEDICARE

## 2017-06-14 DIAGNOSIS — D64.9 ANEMIA, UNSPECIFIED TYPE: ICD-10-CM

## 2017-06-14 LAB
ALBUMIN SERPL BCP-MCNC: 3.4 G/DL
ALP SERPL-CCNC: 65 U/L
ALT SERPL W/O P-5'-P-CCNC: 12 U/L
ANION GAP SERPL CALC-SCNC: 7 MMOL/L
AST SERPL-CCNC: 23 U/L
BASOPHILS # BLD AUTO: 0.06 K/UL
BASOPHILS NFR BLD: 0.7 %
BILIRUB SERPL-MCNC: 0.5 MG/DL
BUN SERPL-MCNC: 21 MG/DL
CALCIUM SERPL-MCNC: 8.9 MG/DL
CHLORIDE SERPL-SCNC: 107 MMOL/L
CO2 SERPL-SCNC: 27 MMOL/L
CREAT SERPL-MCNC: 1.1 MG/DL
DIFFERENTIAL METHOD: ABNORMAL
EOSINOPHIL # BLD AUTO: 0.1 K/UL
EOSINOPHIL NFR BLD: 1.7 %
ERYTHROCYTE [DISTWIDTH] IN BLOOD BY AUTOMATED COUNT: 13.6 %
EST. GFR  (AFRICAN AMERICAN): >60 ML/MIN/1.73 M^2
EST. GFR  (NON AFRICAN AMERICAN): >60 ML/MIN/1.73 M^2
FERRITIN SERPL-MCNC: 24 NG/ML
GLUCOSE SERPL-MCNC: 94 MG/DL
HCT VFR BLD AUTO: 40.8 %
HGB BLD-MCNC: 13.8 G/DL
IRON SERPL-MCNC: 48 UG/DL
LYMPHOCYTES # BLD AUTO: 1.6 K/UL
LYMPHOCYTES NFR BLD: 19.5 %
MCH RBC QN AUTO: 30.9 PG
MCHC RBC AUTO-ENTMCNC: 33.8 %
MCV RBC AUTO: 92 FL
MONOCYTES # BLD AUTO: 0.9 K/UL
MONOCYTES NFR BLD: 11 %
NEUTROPHILS # BLD AUTO: 5.4 K/UL
NEUTROPHILS NFR BLD: 66.5 %
PLATELET # BLD AUTO: 152 K/UL
PMV BLD AUTO: 10.8 FL
POTASSIUM SERPL-SCNC: 5.4 MMOL/L
PROT SERPL-MCNC: 6.3 G/DL
RBC # BLD AUTO: 4.46 M/UL
SATURATED IRON: 11 %
SODIUM SERPL-SCNC: 141 MMOL/L
TOTAL IRON BINDING CAPACITY: 423 UG/DL
TRANSFERRIN SERPL-MCNC: 286 MG/DL
WBC # BLD AUTO: 8.1 K/UL

## 2017-06-14 PROCEDURE — 36415 COLL VENOUS BLD VENIPUNCTURE: CPT

## 2017-06-14 PROCEDURE — 80053 COMPREHEN METABOLIC PANEL: CPT

## 2017-06-14 PROCEDURE — 85025 COMPLETE CBC W/AUTO DIFF WBC: CPT

## 2017-06-14 PROCEDURE — 83540 ASSAY OF IRON: CPT

## 2017-06-14 PROCEDURE — 82728 ASSAY OF FERRITIN: CPT

## 2017-06-19 ENCOUNTER — OFFICE VISIT (OUTPATIENT)
Dept: INTERNAL MEDICINE | Facility: CLINIC | Age: 82
End: 2017-06-19
Payer: MEDICARE

## 2017-06-19 VITALS
SYSTOLIC BLOOD PRESSURE: 122 MMHG | HEART RATE: 69 BPM | WEIGHT: 157.63 LBS | OXYGEN SATURATION: 97 % | DIASTOLIC BLOOD PRESSURE: 64 MMHG | BODY MASS INDEX: 23.89 KG/M2 | HEIGHT: 68 IN

## 2017-06-19 DIAGNOSIS — D64.9 ANEMIA, UNSPECIFIED TYPE: ICD-10-CM

## 2017-06-19 DIAGNOSIS — E61.1 IRON DEFICIENCY: ICD-10-CM

## 2017-06-19 DIAGNOSIS — E03.9 HYPOTHYROIDISM, UNSPECIFIED TYPE: ICD-10-CM

## 2017-06-19 DIAGNOSIS — Z00.00 ANNUAL PHYSICAL EXAM: Primary | ICD-10-CM

## 2017-06-19 DIAGNOSIS — G31.84 MCI (MILD COGNITIVE IMPAIRMENT) WITH MEMORY LOSS: ICD-10-CM

## 2017-06-19 DIAGNOSIS — H25.013 CORTICAL AGE-RELATED CATARACT OF BOTH EYES: ICD-10-CM

## 2017-06-19 DIAGNOSIS — E87.5 SERUM POTASSIUM ELEVATED: ICD-10-CM

## 2017-06-19 PROCEDURE — 99499 UNLISTED E&M SERVICE: CPT | Mod: S$GLB,,, | Performed by: INTERNAL MEDICINE

## 2017-06-19 PROCEDURE — 99397 PER PM REEVAL EST PAT 65+ YR: CPT | Mod: S$GLB,,, | Performed by: INTERNAL MEDICINE

## 2017-06-19 PROCEDURE — 99999 PR PBB SHADOW E&M-EST. PATIENT-LVL IV: CPT | Mod: PBBFAC,,, | Performed by: INTERNAL MEDICINE

## 2017-06-19 NOTE — PROGRESS NOTES
Subjective:       Patient ID: Lavell Coyle is a 85 y.o. male.    Chief Complaint: Annual Exam    HPI   Patient is here for his annual exam    Annual exam: 6/19/2017  Colonoscopy: 7/14/2009: now 85 and no history  PSA 0.55  Optho: Dr. Santillan due soon  Flu:in the fall  Tetanus: 2/12/2013  Shingles:has not done this  Pneumovax: done  Prevnar: done  Review of Systems   Constitutional: Negative for appetite change, chills, fever and unexpected weight change.   Respiratory: Negative for shortness of breath and wheezing.    Cardiovascular: Negative for chest pain, palpitations and leg swelling.   Gastrointestinal: Negative for abdominal pain, blood in stool, diarrhea, nausea and vomiting.       Objective:      Physical Exam   Constitutional: He is oriented to person, place, and time. He appears well-developed and well-nourished. No distress.   Neck: Carotid bruit is not present. No thyromegaly present.   Cardiovascular: Normal rate, regular rhythm and normal heart sounds.  PMI is not displaced.    Pulmonary/Chest: Effort normal and breath sounds normal. No respiratory distress.   Abdominal: Soft. Bowel sounds are normal. He exhibits no distension. There is no tenderness.   Musculoskeletal: He exhibits no edema.   Neurological: He is alert and oriented to person, place, and time.       Assessment:       1. Annual physical exam    2. Hypothyroidism, unspecified type    3. MCI (mild cognitive impairment) with memory loss    4. Cortical age-related cataract of both eyes    5. Serum potassium elevated    6. Iron deficiency    7. Anemia, unspecified type        Plan:       Lavell was seen today for annual exam.    Diagnoses and all orders for this visit:    Annual physical exam: Patient's health maintenance is up today    Hypothyroidism, unspecified type: Patient is to remain on the same levothyroxine 50 µg    MCI (mild cognitive impairment) with memory loss: Patient has just started amantadine 5 mg daily unable to assess at  this time    Cortical age-related cataract of both eyes: Patient asked me about getting glasses and I've read the ophthalmology notes I feel he should return to ophthalmology to have his cataracts assess before new glasses  -     Ambulatory Referral to Ophthalmology    Serum potassium elevated: We discussed high potassium foods    Iron deficiency patient remains iron deficient and will check in 3 months but have him replace his iron at present he is asymptomatic for any other problems his labs are stable  -     CBC auto differential; Future  -     Ferritin; Future  -     Iron and TIBC; Future    Anemia, unspecified type  -     CBC auto differential; Future  -     Ferritin; Future  -     Iron and TIBC; Future      Return in about 3 months (around 9/19/2017) for FU with cbc, iron/tibc and ferritin.    New Prescriptions    No medications on file       Modified Medications    No medications on file       Orders Placed This Encounter   Procedures    CBC auto differential     Standing Status:   Future     Standing Expiration Date:   8/18/2017    Ferritin     Standing Status:   Future     Standing Expiration Date:   8/18/2018    Iron and TIBC     Standing Status:   Future     Standing Expiration Date:   6/19/2018    Ambulatory Referral to Ophthalmology     Referral Priority:   Routine     Referral Type:   Consultation     Referral Reason:   Specialty Services Required     Requested Specialty:   Ophthalmology     Number of Visits Requested:   1       Labs, studies and consults associated with this visit were reviewed

## 2017-06-26 ENCOUNTER — TELEPHONE (OUTPATIENT)
Dept: INTERNAL MEDICINE | Facility: CLINIC | Age: 82
End: 2017-06-26

## 2017-06-26 NOTE — TELEPHONE ENCOUNTER
Please tell him that his lab work reflects that he is more anemic than before and see if he can dome in and talk to me about it anytime in the next 2 weeks. GML

## 2017-07-14 ENCOUNTER — TELEPHONE (OUTPATIENT)
Dept: INTERNAL MEDICINE | Facility: CLINIC | Age: 82
End: 2017-07-14

## 2017-07-14 NOTE — TELEPHONE ENCOUNTER
Spoke to patient's daughter, she wants to talk to you about some of his conditions that she noticed when they were in [place], she just wants to voice her opinion. Please advise. Thank you

## 2017-07-14 NOTE — TELEPHONE ENCOUNTER
----- Message from Arleth Martinez sent at 7/13/2017  4:31 PM CDT -----  Contact: Kassi, phone [phone]  Kassi would like to speak with Dr. Howard before the patient's appointment tomorrow. She says she has noticed some changes in her dad.    Thanks!

## 2017-08-16 ENCOUNTER — TELEPHONE (OUTPATIENT)
Dept: INTERNAL MEDICINE | Facility: CLINIC | Age: 82
End: 2017-08-16

## 2017-08-16 DIAGNOSIS — H61.23 CERUMEN DEBRIS ON TYMPANIC MEMBRANE, BILATERAL: Primary | ICD-10-CM

## 2017-08-16 NOTE — TELEPHONE ENCOUNTER
----- Message from Heather Ramirez sent at 8/16/2017 11:19 AM CDT -----  Contact: pt [phone]  Patient would like to get a referral.  Does the patient already have the specialty clinic appointment scheduled:  no  If yes, what date is the appointment scheduled:   n/a  Referral to what specialty:  ENT  Reason (be specific):  Exam/ ear clean out  Does the patient want the referral with a specific physician:    Is this an [facility]:    Comments:

## 2017-08-22 ENCOUNTER — PATIENT MESSAGE (OUTPATIENT)
Dept: OTOLARYNGOLOGY | Facility: CLINIC | Age: 82
End: 2017-08-22

## 2017-08-23 ENCOUNTER — OFFICE VISIT (OUTPATIENT)
Dept: OPHTHALMOLOGY | Facility: CLINIC | Age: 82
End: 2017-08-23
Payer: MEDICARE

## 2017-08-23 DIAGNOSIS — H04.123 BILATERAL DRY EYES: ICD-10-CM

## 2017-08-23 PROCEDURE — 92014 COMPRE OPH EXAM EST PT 1/>: CPT | Mod: S$GLB,,, | Performed by: OPHTHALMOLOGY

## 2017-08-23 PROCEDURE — 99999 PR PBB SHADOW E&M-EST. PATIENT-LVL II: CPT | Mod: PBBFAC,,, | Performed by: OPHTHALMOLOGY

## 2017-08-23 RX ORDER — AMOXICILLIN 500 MG/1
TABLET, FILM COATED ORAL
Refills: 0 | COMMUNITY
Start: 2017-08-09 | End: 2017-09-27

## 2017-08-23 RX ORDER — HYDROCODONE BITARTRATE AND ACETAMINOPHEN 5; 325 MG/1; MG/1
TABLET ORAL
Refills: 0 | COMMUNITY
Start: 2017-08-09 | End: 2017-09-27

## 2017-08-23 NOTE — PROGRESS NOTES
HPI     Concerns About Ocular Health    Additional comments: 9 mon Cataract chk OU           Comments   DLS: 11/16/2016  6 month check  Long history of ocular migraine  MADELINE  NSC OU   Blepharitis    EES ROMMEL PRN OU  Refresh PRN OU          Last edited by Taco Fernandez on 8/23/2017  2:25 PM. (History)            Assessment /Plan     For exam results, see Encounter Report.    Nuclear cataract, bilateral    Bilateral dry eyes          Reports having memory difficulty    Cataract right and left eyes  Cataract surgery PRN with good understanding.      Now rare  Asked patient to keep diary of TVO's lasting seconds Right VF / right eye  No dizziness / syncope / HA etc  No changes --> discussed fu if change in duration / freq , vision loss etc      Now Rare  Longstanding Hx of Ocular Migraine  Identified Duane's images  No change in quality, duration, intensity  and freq over years        Blepharitis: Discussed treatment options including warm compresses, lid scrubs and medications.  EES q HS x 1 week / month  WCs  Ocusoft Lid scrubs    Dry Eye Syndrome: discussed use of warm compresses, preserved & non-preserved artificial tears, gel and PM ointment options.  Also discussed options utilizing medications.      Symptomatic PVD OD x2 months  - RD warning signs discussed today      Plan  RTC 6 months cataract check  RTC Sooner prn with good understanding

## 2017-09-11 ENCOUNTER — LAB VISIT (OUTPATIENT)
Dept: LAB | Facility: HOSPITAL | Age: 82
End: 2017-09-11
Attending: INTERNAL MEDICINE
Payer: MEDICARE

## 2017-09-11 DIAGNOSIS — D64.9 ANEMIA, UNSPECIFIED TYPE: ICD-10-CM

## 2017-09-11 DIAGNOSIS — E61.1 IRON DEFICIENCY: ICD-10-CM

## 2017-09-11 LAB
BASOPHILS # BLD AUTO: 0.07 K/UL
BASOPHILS NFR BLD: 0.9 %
DIFFERENTIAL METHOD: ABNORMAL
EOSINOPHIL # BLD AUTO: 0.3 K/UL
EOSINOPHIL NFR BLD: 3.6 %
ERYTHROCYTE [DISTWIDTH] IN BLOOD BY AUTOMATED COUNT: 15.7 %
FERRITIN SERPL-MCNC: 24 NG/ML
HCT VFR BLD AUTO: 44.2 %
HGB BLD-MCNC: 14.9 G/DL
IRON SERPL-MCNC: 87 UG/DL
LYMPHOCYTES # BLD AUTO: 1.4 K/UL
LYMPHOCYTES NFR BLD: 17.8 %
MCH RBC QN AUTO: 30 PG
MCHC RBC AUTO-ENTMCNC: 33.7 G/DL
MCV RBC AUTO: 89 FL
MONOCYTES # BLD AUTO: 0.9 K/UL
MONOCYTES NFR BLD: 11.3 %
NEUTROPHILS # BLD AUTO: 5 K/UL
NEUTROPHILS NFR BLD: 65.6 %
PLATELET # BLD AUTO: 145 K/UL
PMV BLD AUTO: 10.6 FL
RBC # BLD AUTO: 4.97 M/UL
SATURATED IRON: 22 %
TOTAL IRON BINDING CAPACITY: 400 UG/DL
TRANSFERRIN SERPL-MCNC: 270 MG/DL
WBC # BLD AUTO: 7.58 K/UL

## 2017-09-11 PROCEDURE — 85025 COMPLETE CBC W/AUTO DIFF WBC: CPT

## 2017-09-11 PROCEDURE — 83540 ASSAY OF IRON: CPT

## 2017-09-11 PROCEDURE — 82728 ASSAY OF FERRITIN: CPT

## 2017-09-11 PROCEDURE — 36415 COLL VENOUS BLD VENIPUNCTURE: CPT

## 2017-09-27 ENCOUNTER — OFFICE VISIT (OUTPATIENT)
Dept: INTERNAL MEDICINE | Facility: CLINIC | Age: 82
End: 2017-09-27
Payer: MEDICARE

## 2017-09-27 VITALS
WEIGHT: 155 LBS | BODY MASS INDEX: 23.49 KG/M2 | SYSTOLIC BLOOD PRESSURE: 112 MMHG | HEART RATE: 65 BPM | DIASTOLIC BLOOD PRESSURE: 68 MMHG | HEIGHT: 68 IN | OXYGEN SATURATION: 93 %

## 2017-09-27 DIAGNOSIS — R41.3 MEMORY LOSS: ICD-10-CM

## 2017-09-27 DIAGNOSIS — D50.9 IRON DEFICIENCY ANEMIA, UNSPECIFIED IRON DEFICIENCY ANEMIA TYPE: Primary | ICD-10-CM

## 2017-09-27 DIAGNOSIS — G31.84 MCI (MILD COGNITIVE IMPAIRMENT) WITH MEMORY LOSS: ICD-10-CM

## 2017-09-27 PROCEDURE — 1126F AMNT PAIN NOTED NONE PRSNT: CPT | Mod: S$GLB,,, | Performed by: INTERNAL MEDICINE

## 2017-09-27 PROCEDURE — 99999 PR PBB SHADOW E&M-EST. PATIENT-LVL IV: CPT | Mod: PBBFAC,,, | Performed by: INTERNAL MEDICINE

## 2017-09-27 PROCEDURE — 3074F SYST BP LT 130 MM HG: CPT | Mod: S$GLB,,, | Performed by: INTERNAL MEDICINE

## 2017-09-27 PROCEDURE — 99213 OFFICE O/P EST LOW 20 MIN: CPT | Mod: S$GLB,,, | Performed by: INTERNAL MEDICINE

## 2017-09-27 PROCEDURE — 1159F MED LIST DOCD IN RCRD: CPT | Mod: S$GLB,,, | Performed by: INTERNAL MEDICINE

## 2017-09-27 PROCEDURE — 3078F DIAST BP <80 MM HG: CPT | Mod: S$GLB,,, | Performed by: INTERNAL MEDICINE

## 2017-09-27 PROCEDURE — 3008F BODY MASS INDEX DOCD: CPT | Mod: S$GLB,,, | Performed by: INTERNAL MEDICINE

## 2017-09-27 RX ORDER — MEMANTINE HYDROCHLORIDE 5 MG/1
5 TABLET ORAL DAILY
Qty: 30 TABLET | Refills: 11 | Status: SHIPPED | OUTPATIENT
Start: 2017-09-27 | End: 2018-01-23

## 2017-09-27 NOTE — PROGRESS NOTES
"Subjective:      Patient ID: Lavell Coyle is a 85 y.o. male.    Chief Complaint: Follow-up (3 months f/u)    HPI:  HPI   Patient is here for follow up of iron deficiency. He is taking more meat than fish.    Patient Active Problem List   Diagnosis    BPH with urinary obstruction    Thyroid nodule    Osteopenia    Skin cancer    History of melanoma excision    Basal cell cancer    MCI (mild cognitive impairment) with memory loss    Hypothyroidism    Medial meniscus tear    S/P R knee surgery, medial/lateral menisectomy, loose body removal    Range of motion deficit    Decreased strength    Nuclear cataract    Ocular migraine    Blepharitis of both eyes    Bilateral dry eyes    Memory loss     Past Medical History:   Diagnosis Date    Allergic blepharitis 5/20/2014    Basal cell cancer     History of mononucleosis     pupura    History of osteopenia     Hypothyroidism     Melanoma     Osteopenia     Prostate hypertrophy     Thrombocytopenia 2002    Thrombocytopenia 2002    Thyroid disease     Thyroid nodule 07/2009    left nodule     Past Surgical History:   Procedure Laterality Date    CARPAL TUNNEL RELEASE      left    KNEE ARTHROSCOPY      KNEE SURGERY      Moh      surgery    ROTATOR CUFF REPAIR      right    SKIN CANCER EXCISION       Family History   Problem Relation Age of Onset    Cancer Other     Alcohol abuse Father     Prostate cancer Neg Hx     Kidney disease Neg Hx      Review of Systems  Objective:     Vitals:    09/27/17 1548   BP: 112/68   Pulse: 65   SpO2: (!) 93%   Weight: 70.3 kg (154 lb 15.7 oz)   Height: 5' 8" (1.727 m)   PainSc: 0-No pain     Body mass index is 23.57 kg/m².  Physical Exam   Constitutional: He appears well-developed and well-nourished. No distress.   Neck: Carotid bruit is not present. No thyromegaly present.   Cardiovascular: Normal rate, regular rhythm and normal heart sounds.    Pulmonary/Chest: Effort normal and breath sounds normal. No " respiratory distress.   Musculoskeletal: He exhibits no edema.     Assessment:     1. Iron deficiency anemia, unspecified iron deficiency anemia type    2. MCI (mild cognitive impairment) with memory loss    3. Memory loss      Plan:   Lavell was seen today for follow-up.    Diagnoses and all orders for this visit:    Iron deficiency anemia, unspecified iron deficiency anemia type: resolved    MCI (mild cognitive impairment) with memory loss: renewed med, he lost prescription  -     memantine (NAMENDA) 5 MG Tab; Take 1 tablet (5 mg total) by mouth once daily.    Memory loss  -     memantine (NAMENDA) 5 MG Tab; Take 1 tablet (5 mg total) by mouth once daily.      Return in about 6 months (around 3/27/2018).     Medication List          Accurate as of 9/27/17  4:01 PM. If you have any questions, ask your nurse or doctor.               CONTINUE taking these medications    calcium citrate-vitamin D3 315-200 mg 315-200 mg-unit per tablet  Commonly known as:  CITRACAL+D     erythromycin ophthalmic ointment  Commonly known as:  ROMYCIN  Place into both eyes every evening. Apply to lids the first week of the month.     finasteride 5 mg tablet  Commonly known as:  PROSCAR  Take 1 tablet (5 mg total) by mouth once daily. Only to be refill by Dr. [facility] per patient request     levothyroxine 50 MCG tablet  Commonly known as:  SYNTHROID  TAKE 1 TABLET BY MOUTH EVERY DAY     LUXAMEND Crea  Generic drug:  emollient combination no.10     memantine 5 MG Tab  Commonly known as:  NAMENDA  Take 1 tablet (5 mg total) by mouth once daily.     MULTIVITAMIN ORAL     OMEGA-3 ORAL     saw palmetto 160 MG capsule     silodosin 4 mg Cap capsule  Commonly known as:  RAPAFLO  Take 1 capsule (4 mg total) by mouth once daily.        STOP taking these medications    amoxicillin 500 MG Tab  Commonly known as:  AMOXIL  Stopped by:  Rosalva Howard MD     hydrocodone-acetaminophen 5-325mg 5-325 mg per tablet  Commonly known as:   VY  Stopped by:  Rosalva Howard MD           Where to Get Your Medications      These medications were sent to "[facility] [id number] [place] [address] [address] AT [facility]  [facility] [place] [address]    Hours:  24-hours Phone:  [phone]   · memantine 5 MG Tab

## 2017-10-23 ENCOUNTER — OFFICE VISIT (OUTPATIENT)
Dept: INTERNAL MEDICINE | Facility: CLINIC | Age: 82
End: 2017-10-23
Payer: MEDICARE

## 2017-10-23 VITALS
HEIGHT: 66 IN | WEIGHT: 154.56 LBS | HEART RATE: 70 BPM | OXYGEN SATURATION: 96 % | SYSTOLIC BLOOD PRESSURE: 116 MMHG | DIASTOLIC BLOOD PRESSURE: 68 MMHG | BODY MASS INDEX: 24.84 KG/M2

## 2017-10-23 DIAGNOSIS — F51.01 PRIMARY INSOMNIA: Primary | ICD-10-CM

## 2017-10-23 PROCEDURE — 99213 OFFICE O/P EST LOW 20 MIN: CPT | Mod: S$GLB,,, | Performed by: INTERNAL MEDICINE

## 2017-10-23 PROCEDURE — 99999 PR PBB SHADOW E&M-EST. PATIENT-LVL III: CPT | Mod: PBBFAC,,, | Performed by: INTERNAL MEDICINE

## 2017-10-23 RX ORDER — TRAZODONE HYDROCHLORIDE 50 MG/1
50 TABLET ORAL NIGHTLY
Qty: 30 TABLET | Refills: 0 | Status: SHIPPED | OUTPATIENT
Start: 2017-10-23 | End: 2017-11-20 | Stop reason: SDUPTHER

## 2017-10-23 NOTE — PROGRESS NOTES
"Subjective:      Patient ID: Lavell Coyle is a 85 y.o. male.    Chief Complaint: Follow-up    HPI:  HPI   Patient is here for follow up of primary insomnia. He had been using benadryl and now wants to stop. We have discussed options and discussed trazodone.  Patient Active Problem List   Diagnosis    BPH with urinary obstruction    Thyroid nodule    Osteopenia    Skin cancer    History of melanoma excision    Basal cell cancer    MCI (mild cognitive impairment) with memory loss    Hypothyroidism    Medial meniscus tear    S/P R knee surgery, medial/lateral menisectomy, loose body removal    Range of motion deficit    Decreased strength    Nuclear cataract    Ocular migraine    Blepharitis of both eyes    Bilateral dry eyes    Memory loss     Past Medical History:   Diagnosis Date    Allergic blepharitis 5/20/2014    Basal cell cancer     History of mononucleosis     pupura    History of osteopenia     Hypothyroidism     Melanoma     Osteopenia     Prostate hypertrophy     Thrombocytopenia 2002    Thrombocytopenia 2002    Thyroid disease     Thyroid nodule 07/2009    left nodule     Past Surgical History:   Procedure Laterality Date    CARPAL TUNNEL RELEASE      left    KNEE ARTHROSCOPY      KNEE SURGERY      Moh      surgery    ROTATOR CUFF REPAIR      right    SKIN CANCER EXCISION       Family History   Problem Relation Age of Onset    Cancer Other     Alcohol abuse Father     Prostate cancer Neg Hx     Kidney disease Neg Hx      Review of Systems   Sleep difficulty  Objective:     Vitals:    10/23/17 1428   BP: 116/68   Pulse: 70   SpO2: 96%   Weight: 70.1 kg (154 lb 8.7 oz)   Height: 5' 6" (1.676 m)   PainSc: 0-No pain     Body mass index is 24.94 kg/m².  Physical Exam   Constitutional: He appears well-developed and well-nourished. No distress.   Neck: Carotid bruit is not present. No thyromegaly present.   Cardiovascular: Normal rate, regular rhythm and normal heart " sounds.    Pulmonary/Chest: Effort normal and breath sounds normal. No respiratory distress.   Musculoskeletal: He exhibits no edema.     Assessment:     1. Primary insomnia      Plan:   Lavell was seen today for follow-up.    Diagnoses and all orders for this visit:    Primary insomnia: will stop benadryl and try trazodone    Other orders  -     trazodone (DESYREL) 50 MG tablet; Take 1 tablet (50 mg total) by mouth every evening.      Return in about 2 weeks (around 11/6/2017).     Medication List          Accurate as of 10/23/17  2:57 PM. If you have any questions, ask your nurse or doctor.               START taking these medications    trazodone 50 MG tablet  Commonly known as:  DESYREL  Take 1 tablet (50 mg total) by mouth every evening.  Started by:  Rosalva Howard MD        CONTINUE taking these medications    calcium citrate-vitamin D3 315-200 mg 315-200 mg-unit per tablet  Commonly known as:  CITRACAL+D     erythromycin ophthalmic ointment  Commonly known as:  ROMYCIN  Place into both eyes every evening. Apply to lids the first week of the month.     finasteride 5 mg tablet  Commonly known as:  PROSCAR  Take 1 tablet (5 mg total) by mouth once daily. Only to be refill by Dr. [facility] per patient request     levothyroxine 50 MCG tablet  Commonly known as:  SYNTHROID  TAKE 1 TABLET BY MOUTH EVERY DAY     LUXAMEND Crea  Generic drug:  emollient combination no.10     memantine 5 MG Tab  Commonly known as:  NAMENDA  Take 1 tablet (5 mg total) by mouth once daily.     MULTIVITAMIN ORAL     OMEGA-3 ORAL     saw palmetto 160 MG capsule     silodosin 4 mg Cap capsule  Commonly known as:  RAPAFLO  Take 1 capsule (4 mg total) by mouth once daily.           Where to Get Your Medications      These medications were sent to [facility] [place] - [address] AT [facility] Shawn [place]  [address] [address]    Phone:  [phone]   · trazodone 50  MG tablet

## 2017-10-24 ENCOUNTER — TELEPHONE (OUTPATIENT)
Dept: OTOLARYNGOLOGY | Facility: CLINIC | Age: 82
End: 2017-10-24

## 2017-10-24 ENCOUNTER — TELEPHONE (OUTPATIENT)
Dept: OPHTHALMOLOGY | Facility: CLINIC | Age: 82
End: 2017-10-24

## 2017-10-24 NOTE — TELEPHONE ENCOUNTER
Patient has been experiencing periodic diplopia for several weeks now. I informed him that Dr. Santillan's next available appointment isn't until 2 months from now in December and we have other ophthalmologist here that would be happy to examine him. Mr. Coyle verbalized his understanding of what I had told him and still insisted on seeing Dr. Santillan in 2 months. I encouraged him to call us and/or come in if the diplopia gets any worse.

## 2017-10-31 ENCOUNTER — HOSPITAL ENCOUNTER (OUTPATIENT)
Dept: RADIOLOGY | Facility: HOSPITAL | Age: 82
Discharge: HOME | End: 2017-10-31
Attending: INTERNAL MEDICINE
Payer: MEDICARE

## 2017-10-31 ENCOUNTER — OFFICE VISIT (OUTPATIENT)
Dept: INTERNAL MEDICINE | Facility: CLINIC | Age: 82
End: 2017-10-31
Payer: MEDICARE

## 2017-10-31 ENCOUNTER — TELEPHONE (OUTPATIENT)
Dept: INTERNAL MEDICINE | Facility: CLINIC | Age: 82
End: 2017-10-31

## 2017-10-31 VITALS
BODY MASS INDEX: 23.72 KG/M2 | HEART RATE: 60 BPM | HEIGHT: 68 IN | SYSTOLIC BLOOD PRESSURE: 122 MMHG | WEIGHT: 156.5 LBS | DIASTOLIC BLOOD PRESSURE: 72 MMHG

## 2017-10-31 DIAGNOSIS — M81.0 OSTEOPOROSIS, UNSPECIFIED OSTEOPOROSIS TYPE, UNSPECIFIED PATHOLOGICAL FRACTURE PRESENCE: ICD-10-CM

## 2017-10-31 DIAGNOSIS — M54.50 ACUTE LOW BACK PAIN WITHOUT SCIATICA, UNSPECIFIED BACK PAIN LATERALITY: Primary | ICD-10-CM

## 2017-10-31 DIAGNOSIS — G31.84 MCI (MILD COGNITIVE IMPAIRMENT) WITH MEMORY LOSS: ICD-10-CM

## 2017-10-31 DIAGNOSIS — M54.50 ACUTE LOW BACK PAIN WITHOUT SCIATICA, UNSPECIFIED BACK PAIN LATERALITY: ICD-10-CM

## 2017-10-31 PROCEDURE — 99213 OFFICE O/P EST LOW 20 MIN: CPT | Mod: S$GLB,,, | Performed by: INTERNAL MEDICINE

## 2017-10-31 PROCEDURE — 72100 X-RAY EXAM L-S SPINE 2/3 VWS: CPT | Mod: TC

## 2017-10-31 PROCEDURE — 99499 UNLISTED E&M SERVICE: CPT | Mod: S$GLB,,, | Performed by: INTERNAL MEDICINE

## 2017-10-31 PROCEDURE — 72100 X-RAY EXAM L-S SPINE 2/3 VWS: CPT | Mod: 26,,, | Performed by: RADIOLOGY

## 2017-10-31 PROCEDURE — 99999 PR PBB SHADOW E&M-EST. PATIENT-LVL III: CPT | Mod: PBBFAC,,, | Performed by: INTERNAL MEDICINE

## 2017-10-31 NOTE — TELEPHONE ENCOUNTER
pls call - he has osteoarthritis of spine.     There is come loss of height of first Lumbar vertebra, but it looks old.    Let me know if he doesn't feel better soon, and I will refer to Physical Tx.

## 2017-10-31 NOTE — PROGRESS NOTES
Subjective:       Patient ID: Lavell Coyle is a 85 y.o. male.    Chief Complaint: Back Pain    HPI   C/o lower back pain, beginning 4 days ago.  Insidious onset.    May be worse on R.  Worse standing.  Relieved with sitting, lying down.    9/10 when walking.  2/10 sitting.    Tried ibuprofen - 1 tab didn't help.  No tightness, spasm.    Doesn't radiate.  No tingling, weakness of legs..  Pain worse upon arising.    Active:  Walks at [employer] regularly.      No memory of previous back pain.     He is eager for a diagnosis and would like an xray.     He has a h/o osteoporosis.   Review of Systems   Constitutional: Negative for activity change and unexpected weight change.   Respiratory: Negative for chest tightness and shortness of breath.    Cardiovascular: Negative for chest pain and leg swelling.   Gastrointestinal: Negative for abdominal pain.   Musculoskeletal: Positive for back pain.   Neurological: Negative for headaches.   Psychiatric/Behavioral: Negative for dysphoric mood.       Objective:      Physical Exam   Constitutional: He appears well-developed and well-nourished. No distress.   Abdominal: He exhibits no distension and no mass. There is no tenderness. There is no rebound and no guarding.   Musculoskeletal: He exhibits no tenderness (of back, spine).   Neurological: He is alert. He displays normal reflexes.   Psychiatric: He has a normal mood and affect. His behavior is normal.   Asked same question repeatedly.       Assessment:       1. Acute low back pain without sciatica, unspecified back pain laterality    2. Osteoporosis, unspecified osteoporosis type, unspecified pathological fracture presence    3. MCI (mild cognitive impairment) with memory loss        Plan:       Lavell was seen today for back pain.    Diagnoses and all orders for this visit:    Acute low back pain without sciatica, unspecified back pain laterality  -     X-Ray Lumbar Spine Ap And Lateral; Future    Osteoporosis, unspecified  osteoporosis type, unspecified pathological fracture presence    MCI (mild cognitive impairment) with memory loss       Ibuprofen 2 tabs 3 times a day with meals  ( 6 tabs daily)     PT suggested if pain persists.      Addendum:  Mild to moderate DJD.  Loss of height anteriorly involving the L1 vertebral body noted.  Minimal L2/L3 retrolisthesis and a few millimeters anterolisthesis at L4/L5 level.  The lumbosacral disc space is narrowed.  No acute fracture or bone destruction identified.          Pt will be notified of results.

## 2017-11-01 ENCOUNTER — TELEPHONE (OUTPATIENT)
Dept: ORTHOPEDICS | Facility: CLINIC | Age: 82
End: 2017-11-01

## 2017-11-01 NOTE — TELEPHONE ENCOUNTER
Ortho Telephone Triage Call  0912  Patient presented at [facility] requesting same day appt for c/o back pain and seen by BRIANNE Horan/Haim Cabello yesterday.   HX: Acute low back pain  Triage Advice: Advised pt that first available appt is on Friday, 11/3/17 at [facility] [place], operated by [facility].   Resolution: Appt scheduled with ANOOP Hernandez PA-C/[facility] [place] on Friday, 11/3/17 at 2:00pm. Advised pt that additional xrays may be needed prior to appt and will be scheduled in Radiology on 1st floor,and that he will be notified. Pt states understanding. Appt slip given to pt.

## 2017-11-02 ENCOUNTER — TELEPHONE (OUTPATIENT)
Dept: INTERNAL MEDICINE | Facility: CLINIC | Age: 82
End: 2017-11-02

## 2017-11-02 ENCOUNTER — TELEPHONE (OUTPATIENT)
Dept: SPINE | Facility: CLINIC | Age: 82
End: 2017-11-02

## 2017-11-02 DIAGNOSIS — M54.5 LOW BACK PAIN, UNSPECIFIED BACK PAIN LATERALITY, UNSPECIFIED CHRONICITY, WITH SCIATICA PRESENCE UNSPECIFIED: Primary | ICD-10-CM

## 2017-11-02 NOTE — TELEPHONE ENCOUNTER
----- Message from Mayo Ventura sent at 11/1/2017  8:01 AM CDT -----  Contact: self [phone]  Patient would like a call back from the office in regards to getting worst then better . Please advise , Thanks !

## 2017-11-03 ENCOUNTER — OFFICE VISIT (OUTPATIENT)
Dept: SPINE | Facility: CLINIC | Age: 82
End: 2017-11-03
Payer: MEDICARE

## 2017-11-03 VITALS
DIASTOLIC BLOOD PRESSURE: 77 MMHG | WEIGHT: 156 LBS | HEART RATE: 62 BPM | SYSTOLIC BLOOD PRESSURE: 152 MMHG | HEIGHT: 68 IN | BODY MASS INDEX: 23.64 KG/M2

## 2017-11-03 DIAGNOSIS — M43.10 ACQUIRED SPONDYLOLISTHESIS: ICD-10-CM

## 2017-11-03 DIAGNOSIS — M54.50 ACUTE LEFT-SIDED LOW BACK PAIN WITHOUT SCIATICA: ICD-10-CM

## 2017-11-03 DIAGNOSIS — M47.819 SPONDYLOSIS WITHOUT MYELOPATHY: ICD-10-CM

## 2017-11-03 DIAGNOSIS — M79.18 MYOFASCIAL MUSCLE PAIN: Primary | ICD-10-CM

## 2017-11-03 PROCEDURE — 99204 OFFICE O/P NEW MOD 45 MIN: CPT | Mod: S$GLB,,, | Performed by: PHYSICIAN ASSISTANT

## 2017-11-03 PROCEDURE — 99999 PR PBB SHADOW E&M-EST. PATIENT-LVL IV: CPT | Mod: PBBFAC,,, | Performed by: PHYSICIAN ASSISTANT

## 2017-11-03 RX ORDER — METHYLPREDNISOLONE 4 MG/1
TABLET ORAL
Qty: 1 PACKAGE | Refills: 0 | Status: SHIPPED | OUTPATIENT
Start: 2017-11-03 | End: 2017-11-06

## 2017-11-03 NOTE — PROGRESS NOTES
Subjective:      Patient ID: Lavell Coyle is a 85 y.o. male.    Chief Complaint: Low-back Pain      HPI     History of basal cell CA, BPH, hypothyroidism, memory loss, and osteopenia.     1 week history of constant left sided LBP with no leg pain. No known injury. Pain is better with sitting. Pain is worse with standing up straight and walking. He rates his pain as a 9 on a scale of 1-10. No numbness, tingling, or weakness. Pain is aching in nature. Massage therapist told him is psoas was tight. No history of back issues. He exercises regularly but has not done so this week.     No relief with massage therapy or hot tub. No PT, injections, or surgery on his back. Tried OTC advil with no noticeable relief.     Patient denies fevers, chills, night sweats, nausea, vomiting, and weight loss. Patient also denies bowel/bladder dysfunction, sexual dysfunction, and any saddle anesthesia.       Review of Systems   Constitution: Negative for fever, weakness, malaise/fatigue, night sweats, weight gain and weight loss.   HENT: Negative for hearing loss, nosebleeds and odynophagia.    Eyes: Negative for blurred vision and double vision.   Cardiovascular: Negative for chest pain, irregular heartbeat and palpitations.   Respiratory: Negative for cough, hemoptysis, shortness of breath and wheezing.    Endocrine: Negative for cold intolerance and polydipsia.   Hematologic/Lymphatic: Does not bruise/bleed easily.   Skin: Negative for dry skin, poor wound healing, rash and suspicious lesions.   Musculoskeletal: Positive for back pain.        See HPI for pertinent positives.   Gastrointestinal: Negative for bloating, abdominal pain, constipation, diarrhea, hematochezia, melena, nausea and vomiting.   Genitourinary: Negative for bladder incontinence, dysuria, hematuria, hesitancy and incomplete emptying.   Neurological: Positive for numbness. Negative for disturbances in coordination, dizziness, focal weakness, headaches, loss of  balance, paresthesias and seizures.   Psychiatric/Behavioral: Negative for depression and hallucinations. The patient is not nervous/anxious.            Objective:        General: Lavell is well-developed, well-nourished, appears stated age, in no acute distress, alert and oriented to time, place and person. He looks much younger than his stated age.     General    Vitals reviewed.  Constitutional: He is oriented to person, place, and time. He appears well-developed and well-nourished.   Pulmonary/Chest: Effort normal.   Abdominal: He exhibits no distension.   Neurological: He is alert and oriented to person, place, and time.   Psychiatric: He has a normal mood and affect. His behavior is normal. Judgment and thought content normal.           Gait: normal, tandem walking is normal and he is able to heel/toe stand.     On exam of the lumbar spine, Inspection of back is normal, mild left lower lumbar tenderness over PSIS/SI joint. Areas of circular ecchymosis from massage (looks like cupping was done).     Skin in lumbar region is warm to the touch without visible rashes.     muscle tone normal without spasm, limited range of motion with pain  Pain in right lateral bending. and Pain in left lateral bending.    Strength testing of the bilateral LEs shows  Right hip abduction:  +5/5  Left hip abduction:  +5/5  Right hip flexion:  +5/5   Left hip flexion:  +5/5  Right hip extensors:  +5/5  Left hip extensors:  +5/5  Right quadriceps:  +5/5  Left quadriceps:  +5/5  Right hamstring:  +5/5  Left hamstring:  +5/5  Right dorsiflexion:  +5/5  Left dorsiflexion:  +5/5  Right plantar flexion:  +5/5  Left plantar flexion:  +5/5   Right EHL:  +5/5   Left EHL:  +5/5    negative clonus of bilateral LEs.     negative straight leg raise on bilateral LEs.     DTRs:  Right patellar:  +2     Left patellar:  +2  Right achilles:  +2   Left achilles:  +2    Sensation is grossly intact in L2, L3, L4, L5, and S1 distribution.    Right hip has  no pain with IR/ER. Left hip has no pain with IR/ER.      On exam of bilateral UEs, patient has full painfree ROM with no signs of clubbing, laxity, cyanosis, edema, instability, weakness, or tenderness.       XRAY INTERPRETATION:  X-rays of lumbar spine (AP/lat) dated 10/31/17 are personally reviewed and show superior compression L1 that appears old, slight, spondylosis T11-T12, slight retrolisthesis L2-L3, slight slip L4-L5, facet hypertrophy L4-S1.        Assessment:       1. Myofascial muscle pain    2. Spondylosis without myelopathy    3. Acquired spondylolisthesis    4. Acute left-sided low back pain without sciatica           Plan:       Orders Placed This Encounter    Ambulatory referral to Physical Therapy - Lumbar    methylPREDNISolone (MEDROL DOSEPACK) 4 mg tablet       1 week history of constant left sided LBP with no leg pain. No known injury. Known spondylosis T11-T12, slight retrolisthesis L2-L3, slight slip L4-L5, facet hypertrophy L4-S1. Pain multifactorial and likely with myofascial with component of left SI/psoas versus facet pain. Treatment options reviewed with patient along with above lumbar XRs. Following plan made:     - Medrol dose pack for symptom relief. Reviewed dosing and side effects.   - PT for lumbar spine with good HEP. External script given.   - If no improvement with above, consider referral to pain management for possible injections (facet versus SI joint).     Follow-up: Return in about 6 weeks (around 12/15/2017). If there are any questions prior to this, the patient was instructed to contact the office.

## 2017-11-06 ENCOUNTER — OFFICE VISIT (OUTPATIENT)
Dept: OTOLARYNGOLOGY | Facility: CLINIC | Age: 82
End: 2017-11-06
Payer: MEDICARE

## 2017-11-06 VITALS
DIASTOLIC BLOOD PRESSURE: 78 MMHG | HEART RATE: 56 BPM | WEIGHT: 156.94 LBS | BODY MASS INDEX: 23.87 KG/M2 | SYSTOLIC BLOOD PRESSURE: 132 MMHG

## 2017-11-06 DIAGNOSIS — H61.23 IMPACTED CERUMEN, BILATERAL: ICD-10-CM

## 2017-11-06 DIAGNOSIS — H91.90 PERCEIVED HEARING LOSS: Primary | ICD-10-CM

## 2017-11-06 PROCEDURE — 69210 REMOVE IMPACTED EAR WAX UNI: CPT | Mod: S$GLB,,, | Performed by: OTOLARYNGOLOGY

## 2017-11-06 PROCEDURE — 99999 PR PBB SHADOW E&M-EST. PATIENT-LVL III: CPT | Mod: PBBFAC,,, | Performed by: OTOLARYNGOLOGY

## 2017-11-06 PROCEDURE — 99202 OFFICE O/P NEW SF 15 MIN: CPT | Mod: 25,S$GLB,, | Performed by: OTOLARYNGOLOGY

## 2017-11-07 NOTE — PROGRESS NOTES
"Subjective:       Patient ID: Lavell Coyle is a 85 y.o. male.    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  His handwritten reason for the visit today is "ear wax".  Audiometry could not be performed today due to the lateness of the visit ( after 5 PM today)   He denies a history of ear pain, ear pressure, ear ringing, ear discharge or significant ear infections.  He denies a family history of hearing loss.  He was examined by his PCP Dr. Rosalva Howard 9/27/17.  He was diagnosed with iron deficiency anemia, mild cognitive impairment with memory loss.   His medication list includes Namenda and synthroid.    His medical problem list includes memory loss, ocular migraine, status post right knee surgery, hypothyroidism, mild cognitive impairment, basal cell cancer, history of melanoma excision 9/20/16, thyroid nodule,   BPH, osteopenia    PMH: Osteopenia, basal cell skin cancer  PSH: T&A as a child  Family history: (Vague) hearing loss  ALLERGIES: None  Habits: One alcohol drink per day and 1 caffeinated drink per day  Occupation: Retired DDS  Review of Systems   Ears: Positive for hearing loss and taken gentramycin/streptomycin (streptomycin).          Objective:     Blood pressure 132/78 pulse 56 height 5 feet 8 inches weight 156 pounds  Gen.: Alert and oriented gentleman in no acute distress  Both ears were examined under the microscope in the micro-procedure room.  Physical Exam   HENT:   Ears:        Assessment:       1. Perceived hearing loss    2. Impacted cerumen, bilateral ; extracted       Plan:     Dry cerumen impactions removed from both eacs  Audiometry encouraged on return               "

## 2017-11-08 ENCOUNTER — OFFICE VISIT (OUTPATIENT)
Dept: INTERNAL MEDICINE | Facility: CLINIC | Age: 82
End: 2017-11-08
Payer: MEDICARE

## 2017-11-08 VITALS
WEIGHT: 152.13 LBS | HEART RATE: 70 BPM | HEIGHT: 68 IN | DIASTOLIC BLOOD PRESSURE: 68 MMHG | BODY MASS INDEX: 23.05 KG/M2 | SYSTOLIC BLOOD PRESSURE: 122 MMHG | OXYGEN SATURATION: 99 %

## 2017-11-08 DIAGNOSIS — M53.9 DISORDER CHARACTERIZED BY BACK PAIN: Primary | ICD-10-CM

## 2017-11-08 PROCEDURE — 99999 PR PBB SHADOW E&M-EST. PATIENT-LVL IV: CPT | Mod: PBBFAC,,, | Performed by: INTERNAL MEDICINE

## 2017-11-08 PROCEDURE — 99214 OFFICE O/P EST MOD 30 MIN: CPT | Mod: S$GLB,,, | Performed by: INTERNAL MEDICINE

## 2017-11-08 NOTE — PROGRESS NOTES
Subjective:      Patient ID: Lavell Coyle is a 85 y.o. male.    Chief Complaint: Follow-up    HPI:  HPI   Patient is here for follow up of myofascial muscle pain. He has spondylosis without myelopathy and acquired spondylolisthesis and acute left sided low back pain without sciatica.    Xray of the lumbar spine:reviewed    Patient has not started PT: [place] would be good for him.  Patient Active Problem List   Diagnosis    BPH with urinary obstruction    Thyroid nodule    Osteopenia    Skin cancer    History of melanoma excision    Basal cell cancer    MCI (mild cognitive impairment) with memory loss    Hypothyroidism    Medial meniscus tear    S/P R knee surgery, medial/lateral menisectomy, loose body removal    Range of motion deficit    Decreased strength    Nuclear cataract    Ocular migraine    Blepharitis of both eyes    Bilateral dry eyes    Memory loss     Past Medical History:   Diagnosis Date    Allergic blepharitis 5/20/2014    Basal cell cancer     History of mononucleosis     pupura    History of osteopenia     Hypothyroidism     Melanoma     Osteopenia     Prostate hypertrophy     Thrombocytopenia 2002    Thrombocytopenia 2002    Thyroid disease     Thyroid nodule 07/2009    left nodule     Past Surgical History:   Procedure Laterality Date    CARPAL TUNNEL RELEASE      left    KNEE ARTHROSCOPY      KNEE SURGERY      Moh      surgery    ROTATOR CUFF REPAIR      right    SKIN CANCER EXCISION       Family History   Problem Relation Age of Onset    Cancer Other     Alcohol abuse Father     Prostate cancer Neg Hx     Kidney disease Neg Hx      Review of Systems   Constitutional: Negative for activity change, chills, fever and unexpected weight change.   Respiratory: Negative for cough, chest tightness, shortness of breath and wheezing.    Cardiovascular: Negative for chest pain, palpitations and leg swelling.     Objective:     Vitals:    11/08/17 0956   BP: 122/68  "  Pulse: 70   SpO2: 99%   Weight: 69 kg (152 lb 1.9 oz)   Height: 5' 8" (1.727 m)   PainSc:   7   PainLoc: Back     Body mass index is 23.13 kg/m².  Physical Exam   Constitutional: He appears well-developed and well-nourished. No distress.   Neck: Carotid bruit is not present. No thyromegaly present.   Cardiovascular: Normal rate, regular rhythm and normal heart sounds.    Pulmonary/Chest: Effort normal and breath sounds normal. No respiratory distress.   Musculoskeletal: He exhibits no edema.   Left sciatica effected without radicular pain to the leg     Assessment:     1. Disorder characterized by back pain      Plan:   Lavell was seen today for follow-up.    Diagnoses and all orders for this visit:    Disorder characterized by back pain  -     Ambulatory consult to Physical Therapy      Return in about 4 months (around 3/8/2018) for Follow up.     Medication List          Accurate as of 11/8/17 10:25 AM. If you have any questions, ask your nurse or doctor.               CONTINUE taking these medications    calcium citrate-vitamin D3 315-200 mg 315-200 mg-unit per tablet  Commonly known as:  CITRACAL+D     erythromycin ophthalmic ointment  Commonly known as:  ROMYCIN  Place into both eyes every evening. Apply to lids the first week of the month.     finasteride 5 mg tablet  Commonly known as:  PROSCAR  Take 1 tablet (5 mg total) by mouth once daily. Only to be refill by Dr. [facility] per patient request     levothyroxine 50 MCG tablet  Commonly known as:  SYNTHROID  TAKE 1 TABLET BY MOUTH EVERY DAY     memantine 5 MG Tab  Commonly known as:  NAMENDA  Take 1 tablet (5 mg total) by mouth once daily.     MULTIVITAMIN ORAL     OMEGA-3 ORAL     saw palmetto 160 MG capsule     silodosin 4 mg Cap capsule  Commonly known as:  RAPAFLO  Take 1 capsule (4 mg total) by mouth once daily.     traZODone 50 MG tablet  Commonly known as:  DESYREL  Take 1 tablet (50 mg total) by mouth every evening.            "

## 2017-11-09 ENCOUNTER — TELEPHONE (OUTPATIENT)
Dept: SPINE | Facility: CLINIC | Age: 82
End: 2017-11-09

## 2017-11-09 NOTE — TELEPHONE ENCOUNTER
----- Message from Yeimi Newman sent at 11/9/2017  9:12 AM CST -----  _x  1st Request  _  2nd Request  _  3rd Request        Who:eula     Why: pt. Would like to speak with brayan regarding back pain. Please call to discuss    What Number to Call Back:[phone]    When to Expect a call back: (Before the end of the day)   -- if the call is after 12:00, the call back will be tomorrow.

## 2017-11-09 NOTE — TELEPHONE ENCOUNTER
Called and spoke with patient.  He stated that he is still having back pain after taking the steroid medication.  He asked what he has going on.  Per talking with Adrianne he has arthritis and some muscle pain.  Per her note she would suggest doing the physical therapy.  If he would like she stated that she could also refer him to Pain Medicine to see what they would advise.  Patient stated that he will do the physical therapy and let us know how he is doing with that.

## 2017-11-20 RX ORDER — TRAZODONE HYDROCHLORIDE 50 MG/1
50 TABLET ORAL NIGHTLY
Qty: 30 TABLET | Refills: 3 | Status: SHIPPED | OUTPATIENT
Start: 2017-11-20 | End: 2018-01-23

## 2017-12-22 ENCOUNTER — OFFICE VISIT (OUTPATIENT)
Dept: SPINE | Facility: CLINIC | Age: 82
End: 2017-12-22
Payer: MEDICARE

## 2017-12-22 VITALS
HEART RATE: 64 BPM | SYSTOLIC BLOOD PRESSURE: 135 MMHG | DIASTOLIC BLOOD PRESSURE: 64 MMHG | BODY MASS INDEX: 23.04 KG/M2 | HEIGHT: 68 IN | WEIGHT: 152 LBS

## 2017-12-22 DIAGNOSIS — M79.18 MYOFASCIAL MUSCLE PAIN: Primary | ICD-10-CM

## 2017-12-22 DIAGNOSIS — M43.10 ACQUIRED SPONDYLOLISTHESIS: ICD-10-CM

## 2017-12-22 DIAGNOSIS — M47.819 SPONDYLOSIS WITHOUT MYELOPATHY: ICD-10-CM

## 2017-12-22 DIAGNOSIS — M54.50 ACUTE LEFT-SIDED LOW BACK PAIN WITHOUT SCIATICA: ICD-10-CM

## 2017-12-22 PROCEDURE — 99213 OFFICE O/P EST LOW 20 MIN: CPT | Mod: S$GLB,,, | Performed by: PHYSICIAN ASSISTANT

## 2017-12-22 PROCEDURE — 99999 PR PBB SHADOW E&M-EST. PATIENT-LVL IV: CPT | Mod: PBBFAC,,, | Performed by: PHYSICIAN ASSISTANT

## 2017-12-22 RX ORDER — IBUPROFEN 200 MG
200 TABLET ORAL EVERY 6 HOURS PRN
COMMUNITY
End: 2018-01-23

## 2017-12-22 NOTE — PROGRESS NOTES
Subjective:      Patient ID: Lavell Coyle is a 85 y.o. male.    Chief Complaint: Follow-up      HPI  (Celestre)    History of basal cell CA, BPH, hypothyroidism, memory loss, and osteopenia. Known spondylosis T11-T12, slight retrolisthesis L2-L3, slight slip L4-L5, facet hypertrophy L4-S1. Pain multifactorial and likely with myofascial with component of left SI/psoas versus facet pain.    Had minimal relief with dose pack at last visit. Was sent to PT as well. He noted significant improvement with this. He continues with constant left sided to mid LBP with no leg pain. Pain is a 5 on a scale of 1-10 (was a 9 at his last visit). Pain is better with PT. Pain is worse with standing and walking. No numbness, tingling, or weakness. He has gotten back to his exercise program. He takes prn motrin.       Review of Systems   Constitution: Negative for chills, fever, night sweats and weight gain.   Gastrointestinal: Negative for bowel incontinence, nausea and vomiting.   Genitourinary: Negative for bladder incontinence.   Neurological: Negative for disturbances in coordination and loss of balance.           Objective:        General: Lavell is well-developed, well-nourished, appears stated age, in no acute distress, alert and oriented to time, place and person. He looks much younger than his stated age.     Ortho/SPM Exam     Patient sits comfortably in the exam room and answers questions appropriately. Grossly patient is able to move bilateral LEs without difficulty. Ambulates normally. Minimal tenderness over left SI joint/psoas.         Assessment:       1. Myofascial muscle pain    2. Spondylosis without myelopathy    3. Acquired spondylolisthesis    4. Acute left-sided low back pain without sciatica           Plan:            Improvement in constant left sided LBP with PT. No leg pain. Known spondylosis T11-T12, slight retrolisthesis L2-L3, slight slip L4-L5, facet hypertrophy L4-S1. Pain multifactorial and likely with  myofascial with component of left SI/psoas mediated pain. He continues to be frustrated that we don't know the exact etiology of his pain. Treatment options reviewed with patient and following plan made:     - Continue PT for lumbar spine. Continue HEP once released.   - Continue light jogging/walking/weights on his own.   - Okay to try gravity boots.   - If pain gets worse, consider referral to pain management for possible injections (psoas versus SI joint).     Follow-up: Return if symptoms worsen or fail to improve. If there are any questions prior to this, the patient was instructed to contact the office.

## 2018-01-08 RX ORDER — LEVOTHYROXINE SODIUM 50 UG/1
TABLET ORAL
Qty: 30 TABLET | Refills: 12 | Status: SHIPPED | OUTPATIENT
Start: 2018-01-08 | End: 2019-02-05 | Stop reason: SDUPTHER

## 2018-01-23 ENCOUNTER — OFFICE VISIT (OUTPATIENT)
Dept: INTERNAL MEDICINE | Facility: CLINIC | Age: 83
End: 2018-01-23
Payer: MEDICARE

## 2018-01-23 VITALS
HEART RATE: 59 BPM | DIASTOLIC BLOOD PRESSURE: 64 MMHG | WEIGHT: 155.19 LBS | SYSTOLIC BLOOD PRESSURE: 124 MMHG | OXYGEN SATURATION: 95 % | HEIGHT: 68 IN | BODY MASS INDEX: 23.52 KG/M2

## 2018-01-23 DIAGNOSIS — D42.9 MENINGIOMAS, MULTIPLE: ICD-10-CM

## 2018-01-23 DIAGNOSIS — G47.9 SLEEP DISORDER: ICD-10-CM

## 2018-01-23 DIAGNOSIS — R41.3 MEMORY DIFFICULTIES: Primary | ICD-10-CM

## 2018-01-23 DIAGNOSIS — I77.1 TORTUOUS AORTA: ICD-10-CM

## 2018-01-23 DIAGNOSIS — E04.1 THYROID NODULE: ICD-10-CM

## 2018-01-23 PROCEDURE — 99999 PR PBB SHADOW E&M-EST. PATIENT-LVL III: CPT | Mod: PBBFAC,,, | Performed by: INTERNAL MEDICINE

## 2018-01-23 PROCEDURE — 99499 UNLISTED E&M SERVICE: CPT | Mod: S$GLB,,, | Performed by: INTERNAL MEDICINE

## 2018-01-23 PROCEDURE — 99214 OFFICE O/P EST MOD 30 MIN: CPT | Mod: S$GLB,,, | Performed by: INTERNAL MEDICINE

## 2018-01-23 NOTE — PROGRESS NOTES
Subjective:      Patient ID: Lavell Coyle is a 85 y.o. male.    Chief Complaint: Back Pain    HPI:  HPI   Back pain:  Evaluated by a PA : went to PT for 6 weeks and was better until he started exercise. Xray:DJD. Works out at [employer]: Decision:Work with a     Sleep: Advil Pm    Memory: having more problems with daily needs such as going to the grocery store.: [employer] or [employer]  JCC: [employer]      Patient Active Problem List   Diagnosis    BPH with urinary obstruction    Thyroid nodule    Osteopenia    Skin cancer    History of melanoma excision    Basal cell cancer    MCI (mild cognitive impairment) with memory loss    Hypothyroidism    Medial meniscus tear    S/P R knee surgery, medial/lateral menisectomy, loose body removal    Range of motion deficit    Decreased strength    Nuclear cataract    Ocular migraine    Blepharitis of both eyes    Bilateral dry eyes    Memory loss    Tortuous aorta    Meningiomas, multiple     Past Medical History:   Diagnosis Date    Allergic blepharitis 5/20/2014    Basal cell cancer     History of mononucleosis     pupura    History of osteopenia     Hypothyroidism     Melanoma     Osteopenia     Prostate hypertrophy     Thrombocytopenia 2002    Thrombocytopenia 2002    Thyroid disease     Thyroid nodule 07/2009    left nodule     Past Surgical History:   Procedure Laterality Date    CARPAL TUNNEL RELEASE      left    KNEE ARTHROSCOPY      KNEE SURGERY      Moh      surgery    ROTATOR CUFF REPAIR      right    SKIN CANCER EXCISION       Family History   Problem Relation Age of Onset    Cancer Other     Alcohol abuse Father     Prostate cancer Neg Hx     Kidney disease Neg Hx      Review of Systems   Constitutional: Negative for appetite change, chills, fever and unexpected weight change.   Respiratory: Negative for shortness of breath and wheezing.    Cardiovascular: Negative for chest pain, palpitations and leg swelling.  "  Gastrointestinal: Negative for abdominal pain, blood in stool, diarrhea, nausea and vomiting.     Objective:     Vitals:    01/23/18 1002   BP: 124/64   Pulse: (!) 59   SpO2: 95%   Weight: 70.4 kg (155 lb 3.3 oz)   Height: 5' 8" (1.727 m)   PainSc:   2     Body mass index is 23.6 kg/m².  Physical Exam   Constitutional: He is oriented to person, place, and time. He appears well-developed and well-nourished. No distress.   Neck: Carotid bruit is not present. No thyromegaly present.   Cardiovascular: Normal rate, regular rhythm and normal heart sounds.  PMI is not displaced.    Pulmonary/Chest: Effort normal and breath sounds normal. No respiratory distress.   Abdominal: Soft. Bowel sounds are normal. He exhibits no distension. There is no tenderness.   Musculoskeletal: He exhibits no edema.   Neurological: He is alert and oriented to person, place, and time.     Assessment:     1. Memory difficulties    2. Tortuous aorta    3. Meningiomas, multiple    4. Thyroid nodule    5. Sleep disorder      Plan:   Lavell was seen today for back pain.    Diagnoses and all orders for this visit:    Memory difficulties: no medication has worked    Tortuous aorta: no statin    Meningiomas, multiple: call if any headaches    Thyroid nodule: continue levothyroxine    Sleep disorder : tried multiple medications, will go back to advil pm      No Follow-up on file.     Medication List          Accurate as of 1/23/18 10:39 AM. If you have any questions, ask your nurse or doctor.               CONTINUE taking these medications    calcium citrate-vitamin D3 315-200 mg 315-200 mg-unit per tablet  Commonly known as:  CITRACAL+D     levothyroxine 50 MCG tablet  Commonly known as:  SYNTHROID  TAKE 1 TABLET BY MOUTH EVERY DAY     MULTIVITAMIN ORAL     OMEGA-3 ORAL     saw palmetto 160 MG capsule        STOP taking these medications    ADVIL 200 MG tablet  Generic drug:  ibuprofen  Stopped by:  Rosalva Howard MD     erythromycin ophthalmic " ointment  Commonly known as:  ROMYCIN  Stopped by:  Rosalva Howard MD     finasteride 5 mg tablet  Commonly known as:  PROSCAR  Stopped by:  Rosalva Howard MD     memantine 5 MG Tab  Commonly known as:  NAMENDA  Stopped by:  Rosalva Howard MD     silodosin 4 mg Cap capsule  Commonly known as:  RAPAFLO  Stopped by:  Rosalva Howard MD     traZODone 50 MG tablet  Commonly known as:  DESYREL  Stopped by:  Rosalva Howard MD

## 2018-01-29 ENCOUNTER — OFFICE VISIT (OUTPATIENT)
Dept: OPTOMETRY | Facility: CLINIC | Age: 83
End: 2018-01-29
Payer: MEDICARE

## 2018-01-29 ENCOUNTER — TELEPHONE (OUTPATIENT)
Dept: INTERNAL MEDICINE | Facility: CLINIC | Age: 83
End: 2018-01-29

## 2018-01-29 DIAGNOSIS — H52.203 HYPEROPIA WITH ASTIGMATISM AND PRESBYOPIA, BILATERAL: ICD-10-CM

## 2018-01-29 DIAGNOSIS — H52.03 HYPEROPIA WITH ASTIGMATISM AND PRESBYOPIA, BILATERAL: ICD-10-CM

## 2018-01-29 DIAGNOSIS — H52.4 HYPEROPIA WITH ASTIGMATISM AND PRESBYOPIA, BILATERAL: ICD-10-CM

## 2018-01-29 DIAGNOSIS — H25.13 NUCLEAR SCLEROSIS OF BOTH EYES: Primary | ICD-10-CM

## 2018-01-29 PROCEDURE — 92015 DETERMINE REFRACTIVE STATE: CPT | Mod: S$GLB,,, | Performed by: OPTOMETRIST

## 2018-01-29 PROCEDURE — 92014 COMPRE OPH EXAM EST PT 1/>: CPT | Mod: S$GLB,,, | Performed by: OPTOMETRIST

## 2018-01-29 PROCEDURE — 99999 PR PBB SHADOW E&M-EST. PATIENT-LVL II: CPT | Mod: PBBFAC,,, | Performed by: OPTOMETRIST

## 2018-01-29 NOTE — TELEPHONE ENCOUNTER
----- Message from Alberto Hanson sent at 1/29/2018 10:26 AM CST -----  Contact: Patient [phone]  Patient would like to get medical advice.    Symptoms (please be specific):  Back pain    Pharmacy name and phone #:  [id number] - [place] [address] AT [facility] [phone] (phone) [phone] (Fax)    Comments: He also wants to know if you called in a prescription from last week for different problem. ( He would not tell me what it was for)

## 2018-01-29 NOTE — PROGRESS NOTES
HPI     Last eye exam was 8/23/17 with Dr. Santillan.  Patient states lost glasses and having to wear sunglasses. Scheduled to   see Dr. Santillan next month but couldn't wait that long. Wants full eye   exam today.  Patient denies diplopia, headaches, flashes/floaters, and pain.      Last edited by Gala Fallon on 1/29/2018  1:14 PM. (History)            Assessment /Plan     For exam results, see Encounter Report.    Nuclear sclerosis of both eyes    Hyperopia with astigmatism and presbyopia, bilateral            1.  Educated on cataracts and affects on vision.  Patient happy with vision.  Eye health normal OU.  2.  Bifocal rx given          RTC 6 months cataract check with Dr. Hallman.

## 2018-01-30 ENCOUNTER — OFFICE VISIT (OUTPATIENT)
Dept: SPINE | Facility: CLINIC | Age: 83
End: 2018-01-30
Payer: MEDICARE

## 2018-01-30 VITALS
HEART RATE: 55 BPM | DIASTOLIC BLOOD PRESSURE: 73 MMHG | SYSTOLIC BLOOD PRESSURE: 134 MMHG | HEIGHT: 68 IN | BODY MASS INDEX: 23.49 KG/M2 | WEIGHT: 155 LBS

## 2018-01-30 DIAGNOSIS — M54.50 BILATERAL LOW BACK PAIN WITHOUT SCIATICA, UNSPECIFIED CHRONICITY: ICD-10-CM

## 2018-01-30 DIAGNOSIS — M43.10 ACQUIRED SPONDYLOLISTHESIS: ICD-10-CM

## 2018-01-30 DIAGNOSIS — M47.819 SPONDYLOSIS WITHOUT MYELOPATHY: Primary | ICD-10-CM

## 2018-01-30 PROCEDURE — 99213 OFFICE O/P EST LOW 20 MIN: CPT | Mod: S$GLB,,, | Performed by: PHYSICIAN ASSISTANT

## 2018-01-30 PROCEDURE — 1125F AMNT PAIN NOTED PAIN PRSNT: CPT | Mod: S$GLB,,, | Performed by: PHYSICIAN ASSISTANT

## 2018-01-30 PROCEDURE — 99499 UNLISTED E&M SERVICE: CPT | Mod: S$GLB,,, | Performed by: PHYSICIAN ASSISTANT

## 2018-01-30 PROCEDURE — 99999 PR PBB SHADOW E&M-EST. PATIENT-LVL III: CPT | Mod: PBBFAC,,, | Performed by: PHYSICIAN ASSISTANT

## 2018-01-30 PROCEDURE — 3008F BODY MASS INDEX DOCD: CPT | Mod: S$GLB,,, | Performed by: PHYSICIAN ASSISTANT

## 2018-01-30 PROCEDURE — 1159F MED LIST DOCD IN RCRD: CPT | Mod: S$GLB,,, | Performed by: PHYSICIAN ASSISTANT

## 2018-01-30 NOTE — TELEPHONE ENCOUNTER
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 Sleep: I suggested he return to Advil pm    2. Back pain: He told me he completed 6 weeks of exercise and was better and then started exercising on his own and he had back pain again. I suggested he work with a  to help with form and prevent further back injury.

## 2018-01-30 NOTE — PROGRESS NOTES
Subjective:      Patient ID: Lavell Coyle is a 85 y.o. male.    Chief Complaint: Follow-up      HPI  (Celestre)    History of basal cell CA, BPH, hypothyroidism, memory loss, and osteopenia. Known spondylosis T11-T12, slight retrolisthesis L2-L3, slight slip L4-L5, facet hypertrophy L4-S1. Pain multifactorial and likely with myofascial with component of left SI/psoas versus facet pain.    He was doing better with PT at his last visit. He finished 6 weeks of PT and had no pain. After this he noted more diffuse LBP that is worse in the morning. Pain is a stiffness and it improves after he moves around for an hour or two. By mid day he has very little pain/stiffness. No leg pain. No numbness, tingling, or weakness. He rates his pain as an 8 on a scale of 1-10. No injury noted.     He is not taking any medications. Previous relief with PT as above. No ESIs or surgery.       Review of Systems   Constitution: Negative for chills, fever, night sweats and weight gain.   Gastrointestinal: Negative for bowel incontinence, nausea and vomiting.   Genitourinary: Negative for bladder incontinence.   Neurological: Negative for disturbances in coordination and loss of balance.           Objective:        General: Lavell is well-developed, well-nourished, appears stated age, in no acute distress, alert and oriented to time, place and person. He looks much younger than his stated age.     Ortho/SPM Exam     Patient sits comfortably in the exam room and answers questions appropriately. Ambulates normally. Some stiffness when getting up from seated position.     No gross lumbar tenderness noted.     Strength testing of the bilateral LEs shows  Right hip abduction:  +5/5  Left hip abduction:  +5/5  Right hip flexion:  +5/5   Left hip flexion:  +5/5  Right hip extensors:  +5/5  Left hip extensors:  +5/5  Right quadriceps:  +5/5  Left quadriceps:  +5/5  Right hamstring:  +5/5  Left hamstring:  +5/5  Right dorsiflexion:   +5/5  Left  dorsiflexion:  +5/5  Right plantar flexion:  +5/5  Left plantar flexion:  +5/5   Right EHL:  +5/5   Left EHL:  +5/5    Sensation is grossly intact to light touch in bilateral LEs. Calf is supple and non tender.         Assessment:       1. Spondylosis without myelopathy    2. Acquired spondylolisthesis    3. Bilateral low back pain without sciatica, unspecified chronicity           Plan:            Improvement in constant left sided LBP with PT. Now with diffuse LBP that is worse in the morning. No leg pain. Pain is a stiffness and it improves after he moves around for an hour or two. By mid day he has very little pain/stiffness. Known  spondylosis T11-T12, slight retrolisthesis L2-L3, slight slip L4-L5, facet hypertrophy L4-S1. Pain from underlying degeneration. He continues to be frustrated that we don't know the exact etiology of his pain. Treatment options reviewed with patient and following plan made:     - Start stretching program in the morning. Given DVD.   - Continue HEP from PT.   - Discussed trial of NSAID. He wants to hold off.   - If no improvement, consider adding NSAID versus lumbar MRI.     Follow-up: Follow-up in about 6 weeks (around 3/13/2018). If there are any questions prior to this, the patient was instructed to contact the office.

## 2018-02-19 ENCOUNTER — LAB VISIT (OUTPATIENT)
Dept: LAB | Facility: HOSPITAL | Age: 83
End: 2018-02-19
Attending: INTERNAL MEDICINE
Payer: MEDICARE

## 2018-02-19 ENCOUNTER — OFFICE VISIT (OUTPATIENT)
Dept: INTERNAL MEDICINE | Facility: CLINIC | Age: 83
End: 2018-02-19
Payer: MEDICARE

## 2018-02-19 VITALS
SYSTOLIC BLOOD PRESSURE: 118 MMHG | HEIGHT: 68 IN | BODY MASS INDEX: 24.29 KG/M2 | HEART RATE: 53 BPM | DIASTOLIC BLOOD PRESSURE: 62 MMHG | WEIGHT: 160.25 LBS | OXYGEN SATURATION: 98 %

## 2018-02-19 DIAGNOSIS — E03.9 HYPOTHYROIDISM, UNSPECIFIED TYPE: Primary | ICD-10-CM

## 2018-02-19 DIAGNOSIS — E03.9 HYPOTHYROIDISM, UNSPECIFIED TYPE: ICD-10-CM

## 2018-02-19 DIAGNOSIS — G31.84 MCI (MILD COGNITIVE IMPAIRMENT) WITH MEMORY LOSS: ICD-10-CM

## 2018-02-19 LAB
ALBUMIN SERPL BCP-MCNC: 3.4 G/DL
ALP SERPL-CCNC: 58 U/L
ALT SERPL W/O P-5'-P-CCNC: 16 U/L
ANION GAP SERPL CALC-SCNC: 4 MMOL/L
AST SERPL-CCNC: 22 U/L
BASOPHILS # BLD AUTO: 0.09 K/UL
BASOPHILS NFR BLD: 0.9 %
BILIRUB SERPL-MCNC: 0.4 MG/DL
BUN SERPL-MCNC: 26 MG/DL
CALCIUM SERPL-MCNC: 8.9 MG/DL
CHLORIDE SERPL-SCNC: 105 MMOL/L
CHOLEST SERPL-MCNC: 189 MG/DL
CHOLEST/HDLC SERPL: 3.2 {RATIO}
CO2 SERPL-SCNC: 28 MMOL/L
CREAT SERPL-MCNC: 0.9 MG/DL
DIFFERENTIAL METHOD: ABNORMAL
EOSINOPHIL # BLD AUTO: 0.1 K/UL
EOSINOPHIL NFR BLD: 1.3 %
ERYTHROCYTE [DISTWIDTH] IN BLOOD BY AUTOMATED COUNT: 13.6 %
EST. GFR  (AFRICAN AMERICAN): >60 ML/MIN/1.73 M^2
EST. GFR  (NON AFRICAN AMERICAN): >60 ML/MIN/1.73 M^2
GLUCOSE SERPL-MCNC: 93 MG/DL
HCT VFR BLD AUTO: 41.6 %
HDLC SERPL-MCNC: 60 MG/DL
HDLC SERPL: 31.7 %
HGB BLD-MCNC: 13.9 G/DL
LDLC SERPL CALC-MCNC: 104 MG/DL
LYMPHOCYTES # BLD AUTO: 1.4 K/UL
LYMPHOCYTES NFR BLD: 14.1 %
MCH RBC QN AUTO: 32.3 PG
MCHC RBC AUTO-ENTMCNC: 33.4 G/DL
MCV RBC AUTO: 97 FL
MONOCYTES # BLD AUTO: 0.9 K/UL
MONOCYTES NFR BLD: 9.2 %
NEUTROPHILS # BLD AUTO: 7.5 K/UL
NEUTROPHILS NFR BLD: 73.8 %
NONHDLC SERPL-MCNC: 129 MG/DL
NRBC BLD-RTO: 0 /100 WBC
PLATELET # BLD AUTO: 160 K/UL
PMV BLD AUTO: 11.3 FL
POTASSIUM SERPL-SCNC: 5.1 MMOL/L
PROT SERPL-MCNC: 5.8 G/DL
RBC # BLD AUTO: 4.31 M/UL
SODIUM SERPL-SCNC: 137 MMOL/L
TRIGL SERPL-MCNC: 125 MG/DL
TSH SERPL DL<=0.005 MIU/L-ACNC: 2.27 UIU/ML
WBC # BLD AUTO: 10.11 K/UL

## 2018-02-19 PROCEDURE — 99499 UNLISTED E&M SERVICE: CPT | Mod: S$GLB,,, | Performed by: INTERNAL MEDICINE

## 2018-02-19 PROCEDURE — 3008F BODY MASS INDEX DOCD: CPT | Mod: S$GLB,,, | Performed by: INTERNAL MEDICINE

## 2018-02-19 PROCEDURE — 85025 COMPLETE CBC W/AUTO DIFF WBC: CPT

## 2018-02-19 PROCEDURE — 1126F AMNT PAIN NOTED NONE PRSNT: CPT | Mod: S$GLB,,, | Performed by: INTERNAL MEDICINE

## 2018-02-19 PROCEDURE — 99999 PR PBB SHADOW E&M-EST. PATIENT-LVL III: CPT | Mod: PBBFAC,,, | Performed by: INTERNAL MEDICINE

## 2018-02-19 PROCEDURE — 99214 OFFICE O/P EST MOD 30 MIN: CPT | Mod: S$GLB,,, | Performed by: INTERNAL MEDICINE

## 2018-02-19 PROCEDURE — 84443 ASSAY THYROID STIM HORMONE: CPT

## 2018-02-19 PROCEDURE — 36415 COLL VENOUS BLD VENIPUNCTURE: CPT

## 2018-02-19 PROCEDURE — 80061 LIPID PANEL: CPT

## 2018-02-19 PROCEDURE — 80053 COMPREHEN METABOLIC PANEL: CPT

## 2018-02-19 PROCEDURE — 1159F MED LIST DOCD IN RCRD: CPT | Mod: S$GLB,,, | Performed by: INTERNAL MEDICINE

## 2018-02-19 NOTE — PROGRESS NOTES
Subjective:      Patient ID: Lavell Coyle is a 86 y.o. male.    Chief Complaint: Follow-up (Back pain PT)    HPI:  HPI   Patient reports that his back has improved . He has MCI with noticeable short term memory loss but functions well and lives alone. He is seen every 6 months. He has not been lost driving and always take familiar routes. He continues in memory improvement at the [facility].His only other medical problem is hypothyroidism.    Patient Active Problem List   Diagnosis    BPH with urinary obstruction    Thyroid nodule    Osteopenia    Skin cancer    History of melanoma excision    Basal cell cancer    MCI (mild cognitive impairment) with memory loss    Hypothyroidism    Medial meniscus tear    S/P R knee surgery, medial/lateral menisectomy, loose body removal    Range of motion deficit    Decreased strength    Nuclear cataract    Ocular migraine    Blepharitis of both eyes    Bilateral dry eyes    Memory loss    Tortuous aorta    Meningiomas, multiple     Past Medical History:   Diagnosis Date    Allergic blepharitis 5/20/2014    Basal cell cancer     History of mononucleosis     pupura    History of osteopenia     Hypothyroidism     Melanoma     Osteopenia     Prostate hypertrophy     Thrombocytopenia 2002    Thrombocytopenia 2002    Thyroid disease     Thyroid nodule 07/2009    left nodule     Past Surgical History:   Procedure Laterality Date    CARPAL TUNNEL RELEASE      left    KNEE ARTHROSCOPY      KNEE SURGERY      Moh      surgery    ROTATOR CUFF REPAIR      right    SKIN CANCER EXCISION       Family History   Problem Relation Age of Onset    Cancer Other     Alcohol abuse Father     Prostate cancer Neg Hx     Kidney disease Neg Hx      Review of Systems   Constitutional: Negative for appetite change, chills, fever and unexpected weight change.   Respiratory: Negative for shortness of breath and wheezing.    Cardiovascular: Negative for chest pain,  "palpitations and leg swelling.   Gastrointestinal: Negative for abdominal pain, blood in stool, diarrhea, nausea and vomiting.     Objective:     Vitals:    02/19/18 1320   BP: 118/62   Pulse: (!) 53   SpO2: 98%   Weight: 72.7 kg (160 lb 4.4 oz)   Height: 5' 8" (1.727 m)   PainSc: 0-No pain     Body mass index is 24.37 kg/m².  Physical Exam   Constitutional: He is oriented to person, place, and time. He appears well-developed and well-nourished. No distress.   Neck: Carotid bruit is not present. No thyromegaly present.   Cardiovascular: Normal rate, regular rhythm and normal heart sounds.  PMI is not displaced.    Pulmonary/Chest: Effort normal and breath sounds normal. No respiratory distress.   Abdominal: Soft. Bowel sounds are normal. He exhibits no distension. There is no tenderness.   Musculoskeletal: He exhibits no edema.   Neurological: He is alert and oriented to person, place, and time.     Assessment:     1. Hypothyroidism, unspecified type    2. MCI (mild cognitive impairment) with memory loss      Plan:   Lavell was seen today for follow-up.    Diagnoses and all orders for this visit:    Hypothyroidism, unspecified type: lab follow up  -     CBC auto differential; Future  -     Comprehensive metabolic panel; Future  -     Lipid panel; Future  -     TSH; Future    MCI (mild cognitive impairment) with memory loss: patient did not feel her responded to meds that were tried by Neurology      Follow-up in about 6 months (around 8/19/2018).     Medication List          Accurate as of 2/19/18  1:47 PM. If you have any questions, ask your nurse or doctor.               CONTINUE taking these medications    calcium citrate-vitamin D3 315-200 mg 315-200 mg-unit per tablet  Commonly known as:  CITRACAL+D     levothyroxine 50 MCG tablet  Commonly known as:  SYNTHROID  TAKE 1 TABLET BY MOUTH EVERY DAY     MULTIVITAMIN ORAL     OMEGA-3 ORAL     saw palmetto 160 MG capsule            "

## 2018-02-21 ENCOUNTER — PES CALL (OUTPATIENT)
Dept: ADMINISTRATIVE | Facility: CLINIC | Age: 83
End: 2018-02-21

## 2018-03-15 ENCOUNTER — OFFICE VISIT (OUTPATIENT)
Dept: SPINE | Facility: CLINIC | Age: 83
End: 2018-03-15
Payer: MEDICARE

## 2018-03-15 VITALS — WEIGHT: 160 LBS | BODY MASS INDEX: 24.25 KG/M2 | HEIGHT: 68 IN

## 2018-03-15 DIAGNOSIS — M43.10 ACQUIRED SPONDYLOLISTHESIS: ICD-10-CM

## 2018-03-15 DIAGNOSIS — M79.18 MYOFASCIAL MUSCLE PAIN: ICD-10-CM

## 2018-03-15 DIAGNOSIS — M54.50 BILATERAL LOW BACK PAIN WITHOUT SCIATICA, UNSPECIFIED CHRONICITY: ICD-10-CM

## 2018-03-15 DIAGNOSIS — M47.819 SPONDYLOSIS WITHOUT MYELOPATHY: Primary | ICD-10-CM

## 2018-03-15 PROCEDURE — 99999 PR PBB SHADOW E&M-EST. PATIENT-LVL III: CPT | Mod: PBBFAC,,, | Performed by: PHYSICIAN ASSISTANT

## 2018-03-15 PROCEDURE — 99213 OFFICE O/P EST LOW 20 MIN: CPT | Mod: S$GLB,,, | Performed by: PHYSICIAN ASSISTANT

## 2018-03-15 NOTE — PROGRESS NOTES
"Subjective:      Patient ID: Lavell Coyle is a 86 y.o. male.    Chief Complaint: Follow-up      HPI  (Celestre)    History of basal cell CA, BPH, hypothyroidism, memory loss, and osteopenia. Known spondylosis T11-T12, slight retrolisthesis L2-L3, slight slip L4-L5, facet hypertrophy L4-S1. Pain multifactorial and likely with myofascial with component of left SI/psoas versus facet pain.    Some improvement with PT, but was still having morning LBP and stiffness that improved as the day progressed. Here for follow up.     He continues with intermittent diffuse LBP with no leg pain. Pain is generally worse in the morning and better as he moves around and "works it out." He continues with his stretches and HEP from PT. He rates his pain as a 3 on a scale of 1-10. Pain is aching/stiffness. No numbness, tingling, or weakness. He is not taking any medications. Previous relief with PT as above. No ESIs or surgery.       Review of Systems   Constitution: Negative for chills, fever, night sweats and weight gain.   Gastrointestinal: Negative for bowel incontinence, nausea and vomiting.   Genitourinary: Negative for bladder incontinence.   Neurological: Negative for disturbances in coordination and loss of balance.           Objective:        General: Lavell is well-developed, well-nourished, appears stated age, in no acute distress, alert and oriented to time, place and person. He looks much younger than his stated age.     Ortho/SPM Exam     Patient sits comfortably in the exam room and answers questions appropriately. Grossly patient is able to move bilateral LEs without difficulty. Ambulates normally.          Assessment:       1. Spondylosis without myelopathy    2. Acquired spondylolisthesis    3. Bilateral low back pain without sciatica, unspecified chronicity    4. Myofascial muscle pain           Plan:       Orders Placed This Encounter    Ambulatory consult to [facility]       Improvement in constant left " sided LBP with PT. Now with diffuse LBP that is worse in the morning. No leg pain. Pain is a stiffness and it improves after he moves around for an hour or two. By mid day he has very little pain/stiffness. Known  spondylosis T11-T12, slight retrolisthesis L2-L3, slight slip L4-L5, facet hypertrophy L4-S1. Pain from underlying degeneration. He continues to be frustrated that we don't know the exact etiology of his pain. Treatment options reviewed with patient and following plan made:     - Referral to [facility] program. Walked over there to set up visit.   - Continue HEP from PT. Continue stretching program.   - Discussed trial of NSAID. He wants to hold off.     Follow-up: Follow-up in about 3 months (around 6/15/2018). If there are any questions prior to this, the patient was instructed to contact the office.

## 2018-03-16 ENCOUNTER — TELEPHONE (OUTPATIENT)
Dept: SPINE | Facility: CLINIC | Age: 83
End: 2018-03-16

## 2018-03-16 DIAGNOSIS — M79.18 MYOFASCIAL MUSCLE PAIN: ICD-10-CM

## 2018-03-16 DIAGNOSIS — M54.50 BILATERAL LOW BACK PAIN WITHOUT SCIATICA, UNSPECIFIED CHRONICITY: ICD-10-CM

## 2018-03-16 DIAGNOSIS — M47.819 SPONDYLOSIS WITHOUT MYELOPATHY: Primary | ICD-10-CM

## 2018-03-16 DIAGNOSIS — M43.10 ACQUIRED SPONDYLOLISTHESIS: ICD-10-CM

## 2018-03-16 RX ORDER — MELOXICAM 15 MG/1
15 TABLET ORAL DAILY PRN
Qty: 14 TABLET | Refills: 0 | Status: SHIPPED | OUTPATIENT
Start: 2018-03-16 | End: 2018-04-16

## 2018-03-16 NOTE — TELEPHONE ENCOUNTER
Called and spoke with patient.  He stated that he is at an 8/10 today compaired  to yesterday that was a 3/10.  Patient would like to know the etiology of the pain.  Advised him that Adrianne would recommend a anti-inflammatory to see if that would help.  Patient stated that he would like to try that and to please send it to his pharmacy.

## 2018-03-23 ENCOUNTER — TELEPHONE (OUTPATIENT)
Dept: INTERNAL MEDICINE | Facility: CLINIC | Age: 83
End: 2018-03-23

## 2018-03-23 NOTE — TELEPHONE ENCOUNTER
----- Message from Nury Morel sent at 3/23/2018  1:31 PM CDT -----  Contact: self/[phone]  Pt is calling to speak with someone in the office in regards to getting a sooner apt time. Pt states that he needs to see the doctor as soon as possible. He also states that he needs to speak with someone about his memory issue. Please advise.        Thanks

## 2018-03-23 NOTE — TELEPHONE ENCOUNTER
----- Message from Melodie Herman sent at 3/23/2018 10:26 AM CDT -----  Contact: call pt at [phone]  Wants to discuss if he should be seen by doctor, stated that you know his case and would know what he is talking about. Pt would not give any other details for the message

## 2018-03-23 NOTE — TELEPHONE ENCOUNTER
Spoke to the patient. He stated he could not make it yesterday because he had forgotten how to get here. He feels he is having memory issues.     I scheduled him to see you on Monday

## 2018-03-26 ENCOUNTER — OFFICE VISIT (OUTPATIENT)
Dept: INTERNAL MEDICINE | Facility: CLINIC | Age: 83
End: 2018-03-26
Payer: MEDICARE

## 2018-03-26 VITALS
WEIGHT: 155.19 LBS | OXYGEN SATURATION: 97 % | HEIGHT: 68 IN | HEART RATE: 77 BPM | BODY MASS INDEX: 23.52 KG/M2 | SYSTOLIC BLOOD PRESSURE: 116 MMHG | DIASTOLIC BLOOD PRESSURE: 62 MMHG

## 2018-03-26 DIAGNOSIS — M54.9 LEFT-SIDED BACK PAIN, UNSPECIFIED BACK LOCATION, UNSPECIFIED CHRONICITY: Primary | ICD-10-CM

## 2018-03-26 PROCEDURE — 99999 PR PBB SHADOW E&M-EST. PATIENT-LVL IV: CPT | Mod: PBBFAC,,, | Performed by: INTERNAL MEDICINE

## 2018-03-26 PROCEDURE — 99214 OFFICE O/P EST MOD 30 MIN: CPT | Mod: S$GLB,,, | Performed by: INTERNAL MEDICINE

## 2018-03-26 NOTE — PROGRESS NOTES
Subjective:      Patient ID: Lavell Coyle is a 86 y.o. male.    Chief Complaint: Memory Loss and Back Pain    HPI:  HPI   Back pain since October: And we reviewed the history of the back pain and the providers he has seen.  He states that his back pain is quite severe and central without radiculopathy.  He has not had an evaluation with an MRI and now it is approximately 6 months of back pain that he has had.  Patient Active Problem List   Diagnosis    BPH with urinary obstruction    Thyroid nodule    Osteopenia    Skin cancer    History of melanoma excision    Basal cell cancer    MCI (mild cognitive impairment) with memory loss    Hypothyroidism    Medial meniscus tear    S/P R knee surgery, medial/lateral menisectomy, loose body removal    Range of motion deficit    Decreased strength    Nuclear cataract    Ocular migraine    Blepharitis of both eyes    Bilateral dry eyes    Memory loss    Tortuous aorta    Meningiomas, multiple     Past Medical History:   Diagnosis Date    Allergic blepharitis 5/20/2014    Basal cell cancer     History of mononucleosis     pupura    History of osteopenia     Hypothyroidism     Melanoma     Osteopenia     Prostate hypertrophy     Thrombocytopenia 2002    Thrombocytopenia 2002    Thyroid disease     Thyroid nodule 07/2009    left nodule     Past Surgical History:   Procedure Laterality Date    CARPAL TUNNEL RELEASE      left    KNEE ARTHROSCOPY      KNEE SURGERY      Moh      surgery    ROTATOR CUFF REPAIR      right    SKIN CANCER EXCISION       Family History   Problem Relation Age of Onset    Cancer Other     Alcohol abuse Father     Prostate cancer Neg Hx     Kidney disease Neg Hx      Review of Systems   Constitutional: Negative for appetite change, chills, fever and unexpected weight change.   Respiratory: Negative for shortness of breath and wheezing.    Cardiovascular: Negative for chest pain, palpitations and leg swelling.  "  Gastrointestinal: Negative for abdominal pain, blood in stool, diarrhea, nausea and vomiting.     Objective:     Vitals:    03/26/18 1509   BP: 116/62   Pulse: 77   SpO2: 97%   Weight: 70.4 kg (155 lb 3.3 oz)   Height: 5' 8" (1.727 m)   PainSc:   5   PainLoc: Back     Body mass index is 23.6 kg/m².  Physical Exam   Constitutional: He appears well-developed and well-nourished. No distress.   Neck: Carotid bruit is not present. No thyromegaly present.   Cardiovascular: Normal rate, regular rhythm and normal heart sounds.    Pulmonary/Chest: Effort normal and breath sounds normal. No respiratory distress.   Musculoskeletal: He exhibits no edema.    pain to the left of mid lumbar area  Assessment:     1. Left-sided back pain, unspecified back location, unspecified chronicity      Plan:   Lavell was seen today for memory loss and back pain.    Diagnoses and all orders for this visit:    Left-sided back pain, unspecified back location, unspecified chronicity: Will evaluate  -     MRI Lumbar Spine Without Contrast; Future      Follow-up in about 10 days (around 4/5/2018) for FU after MRI.     Medication List          Accurate as of 3/26/18  4:28 PM. If you have any questions, ask your nurse or doctor.               CONTINUE taking these medications    ADVIL PM ORAL     calcium citrate-vitamin D3 315-200 mg 315-200 mg-unit per tablet  Commonly known as:  CITRACAL+D     levothyroxine 50 MCG tablet  Commonly known as:  SYNTHROID  TAKE 1 TABLET BY MOUTH EVERY DAY     meloxicam 15 MG tablet  Commonly known as:  MOBIC  Take 1 tablet (15 mg total) by mouth daily as needed for Pain. Take with food.     MULTIVITAMIN ORAL     OMEGA-3 ORAL     saw palmetto 160 MG capsule            "

## 2018-03-29 ENCOUNTER — HOSPITAL ENCOUNTER (OUTPATIENT)
Dept: RADIOLOGY | Facility: HOSPITAL | Age: 83
Discharge: HOME | End: 2018-03-29
Attending: INTERNAL MEDICINE
Payer: MEDICARE

## 2018-03-29 DIAGNOSIS — M54.9 LEFT-SIDED BACK PAIN, UNSPECIFIED BACK LOCATION, UNSPECIFIED CHRONICITY: ICD-10-CM

## 2018-03-29 PROCEDURE — 72148 MRI LUMBAR SPINE W/O DYE: CPT | Mod: TC

## 2018-03-29 PROCEDURE — 72148 MRI LUMBAR SPINE W/O DYE: CPT | Mod: 26,,, | Performed by: RADIOLOGY

## 2018-03-30 ENCOUNTER — TELEPHONE (OUTPATIENT)
Dept: INTERNAL MEDICINE | Facility: CLINIC | Age: 83
End: 2018-03-30

## 2018-03-30 NOTE — TELEPHONE ENCOUNTER
Lets see if we can get him in earlier than the scheduled appt to discuss the MRI results. ( Please remember he has short term memory loss and give hime time to write down the appt. GML

## 2018-04-04 ENCOUNTER — TELEPHONE (OUTPATIENT)
Dept: INTERNAL MEDICINE | Facility: CLINIC | Age: 83
End: 2018-04-04

## 2018-04-04 NOTE — TELEPHONE ENCOUNTER
----- Message from Roxann Reynoso sent at 4/4/2018  2:21 PM CDT -----  Contact: Mobile: [phone] Self   Patient would like to get test results.  Name of test (lab, mammo, etc.):  MRI  Date of test:  3/29  Ordering provider: Dr. Howard   Where was the test performed:  [facility]  Comments:

## 2018-04-09 ENCOUNTER — OFFICE VISIT (OUTPATIENT)
Dept: INTERNAL MEDICINE | Facility: CLINIC | Age: 83
End: 2018-04-09
Payer: MEDICARE

## 2018-04-09 ENCOUNTER — CLINICAL SUPPORT (OUTPATIENT)
Dept: REHABILITATION | Facility: OTHER | Age: 83
End: 2018-04-09
Attending: PHYSICIAN ASSISTANT
Payer: MEDICARE

## 2018-04-09 VITALS
WEIGHT: 153 LBS | SYSTOLIC BLOOD PRESSURE: 122 MMHG | OXYGEN SATURATION: 99 % | HEART RATE: 65 BPM | BODY MASS INDEX: 23.19 KG/M2 | HEIGHT: 68 IN | DIASTOLIC BLOOD PRESSURE: 64 MMHG

## 2018-04-09 DIAGNOSIS — M54.50 ACUTE LEFT-SIDED LOW BACK PAIN WITHOUT SCIATICA: ICD-10-CM

## 2018-04-09 DIAGNOSIS — S32.010D CLOSED COMPRESSION FRACTURE OF L1 LUMBAR VERTEBRA WITH ROUTINE HEALING, SUBSEQUENT ENCOUNTER: Primary | ICD-10-CM

## 2018-04-09 DIAGNOSIS — M51.36 BULGING LUMBAR DISC: ICD-10-CM

## 2018-04-09 PROBLEM — S32.010A CLOSED COMPRESSION FRACTURE OF FIRST LUMBAR VERTEBRA: Status: ACTIVE | Noted: 2018-04-09

## 2018-04-09 PROCEDURE — 99213 OFFICE O/P EST LOW 20 MIN: CPT | Mod: S$GLB,,, | Performed by: INTERNAL MEDICINE

## 2018-04-09 PROCEDURE — 97161 PT EVAL LOW COMPLEX 20 MIN: CPT

## 2018-04-09 PROCEDURE — G8978 MOBILITY CURRENT STATUS: HCPCS | Mod: CI

## 2018-04-09 PROCEDURE — 97110 THERAPEUTIC EXERCISES: CPT

## 2018-04-09 PROCEDURE — 99999 PR PBB SHADOW E&M-EST. PATIENT-LVL III: CPT | Mod: PBBFAC,,, | Performed by: INTERNAL MEDICINE

## 2018-04-09 PROCEDURE — G8979 MOBILITY GOAL STATUS: HCPCS | Mod: CI

## 2018-04-09 NOTE — PATIENT INSTRUCTIONS
Top 10 tips for back and neck pain    The spine is the pillar of the body, providing the foundation for the upper and lower extremities to attach.  Our spines withstand significant forces all day long.  There are many ways in which we can take care of our backs.  Here are a couple tips to help you keep your back in action.    1. Watch your posture in sitting.     Sit in chairs with back supports, and use a lumbar roll to maintain the normal curve of your low back. Ensure the height of your chair is such that your feet rest flat on the floor with your knees and hips level.  The average American sits 9 hours a day.  Do not sit longer than 1 hour without getting up to stretch or move.     2. Watch your posture in standing.   Maintain the normal curves of your spine in standing.   When standing tall, you should be able to draw a line down through your ear, shoulder, hip, and ankle.  Wear good shoes and consider using a standing desk mat if you stand a lot during work.  Take breaks from standing.    3. Lift correctly   Lift objects by using the strong muscles in your legs.  Get close to the object, bend your knees and your hips, and maintain the normal curve of your low back. Do not twist when lifting or carrying items. Think about the tasks you perform daily at work or home, and minimize lifting and carrying objects.  Use rolling carts or other strategies to reduce back strain.    4. Exercise regularly  Individuals who exercise regularly generally experience better health, reduced back pain, and less stress.  A good exercise program has a stretching component, a strengthening component, and an aerobic component.   Maintain the mobility of your spine by stretching daily. Strengthen your core and extremities several times a week.   Get regular cardiovascular exercise, 3-5/week.  Choose activities you like such as walking, swimming, dancing, or riding a bike.     5. Quit Smoking  Smoking increases the likelihood of back  pain.  It is thought that smoking reduces the blood supply to the discs between the vertebrae and this may lead to degeneration of these discs.  Talk to your Physician about quitting.  There are many smoking cessation options that may work for you.    6. Keep moving even when you have pain  Motion is lotion.   The majority of back pain is mechanical in nature, and will likely reduce with gentle movements, stretching, and walking.  As tempting as it may be to stay in bed when you are hurting, remember that you will likely feel better by getting up and gently moving and walking.  Limit bed rest.      7. Maintain a healthy diet  Try to maintain a healthy diet and weight.        8. Stay hydrated  The average adult is approximately 60 % water.  Staying hydrated is beneficial for all aspects of health.  In general, an adult should drink half of their body weight in ounces.  For example, if you weight 180 lbs, you should drink 90 ounces of water daily.     9. Get regular sleep   Ensure that you get a good nights rest on a regular basis. The discs in your spine hydrate when you lie down to sleep. Your spine needs the rest too.     10. See Your Physician    Make an appointment to see your Physician for back pain that is progressively worsening, and for back pain that is no better or worse with changing positions and activities.        Z LIE POSITION  Z Lie is a position that you can use to unload your back and assist with pain reduction.  Lie on your back and rest your calves on the seat on a chair or a bench.  Viewed from the side, you should resemble a Z.  Your therapist may suggest sliding the chair closer to you, so your knees are over your stomach.   Your therapist may also suggest a pillow under your buttock if needed.  Follow the directions from your therapist.  The goal of this position is to reduce your symptoms.    Z lie can be done in a variety of ways.  It can be done on a bed resting your legs on a light  and easy to lift chair                Knee-to-Chest Stretch: Bilateral        With hands behind knees, pull both knees in to chest until a comfortable stretch is felt in lower back and buttocks. Keep back relaxed. Hold 10 seconds.  Repeat 10  Times.Do 2-3 sessions per day.     https://[email or web address].shoutr/128     Copyright © [employer]. All rights reserved.          Lower Trunk Rotation Stretch        Keeping back flat and feet together, rotate knees to left side. Hold 3 seconds. Repeat on R side  Repeat 10  Times.Do 2-3 sessions per day.     https://[email or web address].shoutr/122     Copyright © [employer]. All rights reserved.       Pelvic Tilt: Posterior - Legs Bent (Supine)        Tighten stomach and flatten back by rolling pelvis down. Hold 3 seconds. Relax.  Repeat 10  Times.Do 2-3 sessions per day.     [email or web address].shoutr/202     Copyright © [employer]. All rights reserved.

## 2018-04-09 NOTE — PROGRESS NOTES
Subjective:      Patient ID: Lavell Coyle is a 86 y.o. male.    Chief Complaint: Results    HPI:  HPI   Reviewed MRI    Impression       Multilevel lumbar spondylosis, resulting in moderate spinal canal stenosis at L2-3, and mild / moderate neural foraminal narrowing from L1-2 through L5-S1, as above.    Prominent degenerative disc disease with large left paracentral disc extrusion at L2-3, which  effaces of the left lateral recess and neural foramen, and possibly impinges upon the exiting left L2 nerve root.    Remote mild L1 compression fracture.    Electronically signed by resident: Zeke Sagastume  Date: 03/29/2018  Time: 16:29    Electronically signed by: Kashmir Lancaster MD  Date: 03/29/2018  Time: 17:42       Patient Active Problem List   Diagnosis    BPH with urinary obstruction    Thyroid nodule    Osteopenia    Skin cancer    History of melanoma excision    Basal cell cancer    MCI (mild cognitive impairment) with memory loss    Hypothyroidism    Medial meniscus tear    S/P R knee surgery, medial/lateral menisectomy, loose body removal    Range of motion deficit    Decreased strength    Nuclear cataract    Ocular migraine    Blepharitis of both eyes    Bilateral dry eyes    Memory loss    Tortuous aorta    Meningiomas, multiple    Closed compression fracture of first lumbar vertebra    Acute left-sided low back pain without sciatica     Past Medical History:   Diagnosis Date    Allergic blepharitis 5/20/2014    Basal cell cancer     History of mononucleosis     pupura    History of osteopenia     Hypothyroidism     Melanoma     Osteopenia     Prostate hypertrophy     Thrombocytopenia 2002    Thrombocytopenia 2002    Thyroid disease     Thyroid nodule 07/2009    left nodule     Past Surgical History:   Procedure Laterality Date    CARPAL TUNNEL RELEASE      left    KNEE ARTHROSCOPY      KNEE SURGERY      Moh      surgery    ROTATOR CUFF REPAIR      right    SKIN CANCER  "EXCISION       Family History   Problem Relation Age of Onset    Cancer Other     Alcohol abuse Father     Prostate cancer Neg Hx     Kidney disease Neg Hx      Review of Systems continued back pain  Objective:     Vitals:    04/09/18 1048   BP: 122/64   Pulse: 65   SpO2: 99%   Weight: 69.4 kg (153 lb)   Height: 5' 8" (1.727 m)   PainSc:   8   PainLoc: Back     Body mass index is 23.26 kg/m².  Physical Exam: no exam today  Assessment:     1. Closed compression fracture of L1 lumbar vertebra with routine healing, subsequent encounter    2. Bulging lumbar disc      Plan:   Lavell was seen today for results.    Diagnoses and all orders for this visit:    Closed compression fracture of L1 lumbar vertebra with routine healing, subsequent encounter: sent message to Dr. Esqueda    Bulging lumbar disc: sent message to Dr. Esqueda      Follow up on April 19th     Medication List          Accurate as of 4/9/18 10:10 PM. If you have any questions, ask your nurse or doctor.               CONTINUE taking these medications    ADVIL PM ORAL     calcium citrate-vitamin D3 315-200 mg 315-200 mg-unit per tablet  Commonly known as:  CITRACAL+D     levothyroxine 50 MCG tablet  Commonly known as:  SYNTHROID  TAKE 1 TABLET BY MOUTH EVERY DAY     meloxicam 15 MG tablet  Commonly known as:  MOBIC  Take 1 tablet (15 mg total) by mouth daily as needed for Pain. Take with food.     MULTIVITAMIN ORAL     OMEGA-3 ORAL     saw palmetto 160 MG capsule            "

## 2018-04-10 ENCOUNTER — TELEPHONE (OUTPATIENT)
Dept: SPINE | Facility: CLINIC | Age: 83
End: 2018-04-10

## 2018-04-10 DIAGNOSIS — M54.50 LUMBAR SPINE PAIN: Primary | ICD-10-CM

## 2018-04-10 NOTE — PLAN OF CARE
[facility]     Name: Lvaell Coyle  Clinic Number: [id number]    Lavell is a 86 y.o. male evaluated on 04/09/2018.   Time In: 1:20  Time out: 2:30    Diagnosis:   Encounter Diagnosis   Name Primary?    Acute left-sided low back pain without sciatica      Physician: Adrianne Hernandez PA-C  Treatment Orders: PT Eval and Treat    Past Medical History:   Diagnosis Date    Allergic blepharitis 5/20/2014    Basal cell cancer     History of mononucleosis     pupura    History of osteopenia     Hypothyroidism     Melanoma     Osteopenia     Prostate hypertrophy     Thrombocytopenia 2002    Thrombocytopenia 2002    Thyroid disease     Thyroid nodule 07/2009    left nodule     Current Outpatient Prescriptions   Medication Sig    calcium citrate-vitamin D3 315-200 mg (CITRACAL+D) 315-200 mg-unit per tablet Take 1 tablet by mouth once daily.    IBUPROFEN/DIPHENHYDRAMINE CIT (ADVIL PM ORAL) Take by mouth.    levothyroxine (SYNTHROID) 50 MCG tablet TAKE 1 TABLET BY MOUTH EVERY DAY    meloxicam (MOBIC) 15 MG tablet Take 1 tablet (15 mg total) by mouth daily as needed for Pain. Take with food.    MULTIVITAMIN ORAL Take by mouth.    OMEGA-3/DHA/EPA/FISH OIL (OMEGA-3 ORAL) Take by mouth.    saw palmetto 160 MG capsule Take 160 mg by mouth 2 (two) times daily.       No current facility-administered medications for this visit.      Review of patient's allergies indicates:  No Known Allergies  Precautions: Tortuous Aorta     Pattern of pain determined: Pattern 2  Visit # authorized: 20  Authorization period: 3/37/2018 - 12/31/2018  Plan of care Expiration: 7/9/2018 (sent 04/10/2018)      HISTORY   History of Present Illness:  Pt states that he has been having LPB for a few months that started without cause, he was in PT about 6-8 months ago, he still does the exercises occasionally and that usually takes care of it. Pt states that his primary reason fo coming to therapy is that he  "doesn't want ot get any worse. Pt has an active lifestyle where he jogs about 1.8 miles a day slowly and does weight training about 3x/week with machines and light weights. Pt reports that he is not having any pain at this time and only has minimal pain at the L side of the low back for a few seconds when he does have pain.     Per MD:HPI  (Celestre)     History of basal cell CA, BPH, hypothyroidism, memory loss, and osteopenia. Known spondylosis T11-T12, slight retrolisthesis L2-L3, slight slip L4-L5, facet hypertrophy L4-S1. Pain multifactorial and likely with myofascial with component of left SI/psoas versus facet pain.     Some improvement with PT, but was still having morning LBP and stiffness that improved as the day progressed. Here for follow up.      He continues with intermittent diffuse LBP with no leg pain. Pain is generally worse in the morning and better as he moves around and "works it out." He continues with his stretches and HEP from PT. He rates his pain as a 3 on a scale of 1-10. Pain is aching/stiffness. No numbness, tingling, or weakness. He is not taking any medications. Previous relief with PT as above. No ESIs or surgery.     Diagnostic Tests: From [facility] MRI 3/26/2018  EXAMINATION:  MRI LUMBAR SPINE WITHOUT CONTRAST    CLINICAL HISTORY:  Low back pain, >6wks conservative tx, persistent-progressive sx, surgical candidate; Dorsalgia, unspecified    TECHNIQUE:  Multiplanar, multisequence MR images were acquired from the thoracolumbar junction to the sacrum without the administration of contrast.    COMPARISON:  Radiograph 10/31/2017.    FINDINGS:  There is mild dextroconvex curvature of the lumbar spine.  Grade 1 retrolisthesis of L2 on L3 identified.  There is a mild chronic anterior compression fracture involving the L1 vertebral body with associated 30% height loss.  No evidence of acute fracture.  There is a rounded hyperintense T1/T2 and hypointense stir lesion in the right lateral aspect of " the L3 vertebral body, most compatible with a hemangioma.  Additional smaller hemangiomas are seen in the L4 vertebral body.  There is moderate degenerative disc disease at L2-3 with associated mild endplate edema.    Visualized conus in nerve roots are in normal in appearance.  The conus terminates at the L1 vertebral body level.    There are bilateral low T1/high T2 renal cortical lesions, too small to characterize but likely cysts.  The remaining adjacent soft tissue structures demonstrate no significant abnormalities.    There is multilevel degenerative changes of the lumbar spine, as follows:    T12-L1: No significant spinal canal or neural foraminal narrowing.    L1-L2: Degenerative disc disease with circumferential disc bulge and mild bilateral facet arthrosis resulting in mild bilateral neural foraminal narrowing.  No spinal canal stenosis.    L2-3: Circumferential disc bulge with a superimposed large left paracentral disc extrusion effacing the left lateral recess and neural foramen with likely impingement on the exiting left L2 nerve root.  Moderate spinal canal stenosis noted.  There is moderate bilateral facet arthrosis which contributes to mild right neural foraminal narrowing.    L3-4: Moderate bilateral facet arthrosis and ligamentum flavum hypertrophy without significant spinal canal or neural foraminal narrowing.    L4-5: Circumferential disc bulge, ligamentum flavum hypertrophy, and moderate bilateral facet arthrosis resulting in moderate left and mild right neural foraminal narrowing.  No spinal canal stenosis.    L5-S1: Circumferential disc bulge and moderate bilateral facet arthrosis resulting in moderate right and mild left neural foraminal narrowing.  No spinal canal stenosis.    Impression  Multilevel lumbar spondylosis, resulting in moderate spinal canal stenosis at L2-3, and mild / moderate neural foraminal narrowing from L1-2 through L5-S1, as above.    Prominent degenerative disc disease  with large left paracentral disc extrusion at L2-3, which  effaces of the left lateral recess and neural foramen, and possibly impinges upon the exiting left L2 nerve root.    Remote mild L1 compression fracture.    Pain Scale: Lavell rates pain on a scale of 0-10 to be 8 at worst; 0 currently; 0 at best using VAS.   Pain location: L side lof low back    Aggravating factors: none  Easing Factors: none  Disturbed Sleep: no    Pattern of pain questions:  1.  Where is your pain the worst? Low back  2.  Is your pain constant or intermittent? intermittent  3.  Does bending forward make your typical pain worse? no  4.  Since the start of your back pain, has there been a change in your bowel or bladder? no  5.  What can't you do now that you use to be able to do? nothing    Prior Treatment: Physical therapy 6-8 mons ago for 3-4 weeks  Prior functional status: Independent  DME owned/used: None  Occupation:  Retired Dentist (1999)   Leisure: Running, weight training                     Pts goals:  To not get worse    Red Flag Screening:   Cough  Sneeze  Strain: (--)  Bladder/ bowel: (--)  Falls: (--)  Night pain: (--)  Unexplained weight loss: (--)  General health: Good    OBJECTIVE     Postural examination/scapula alignment: Rounded shoulder, Head forward and Slouched posture  Joint integrity: WNL for age  Skin integrity: Intact  Edema: None noted  Sitting: Poor  Standing: Fair  Correction of posture: no effect with lumbar roll    MOVEMENT LOSS    ROM Loss   Flexion within functional limits   Extension minimal loss   Side bending Right minimal loss   Side bending Left minimal loss   Rotation Right minimal loss   Rotation Left minimal loss     Lower Extremity Strength  Right LE  Left LE    Hip flexion: 4+/5 Hip flexion: 4+/5   Hip extension:  3+/5 Hip extension: 3+/5   Hip abduction: 5/5 Hip abduction: 5/5   Hip adduction:  5/5 Hip adduction:  5/5   Hip Internal rotation   4+/5 Hip Internal rotation 4+/5   Knee Flexion 5/5  Knee Flexion 5/5   Knee Extension 5/5 Knee Extension 5/5   Ankle dorsiflexion: 5/5 Ankle dorsiflexion: 5/5   Ankle plantarflexion: 5/5 Ankle plantarflexion: 5/5       GAIT:  Assistive Device used: none  Level of Assistance: independent  Patient displays the following gait deviations:  no gait deviations observed.     Special Tests:   Test Name  Test Result   Prone Instability Test (--)   SI Joint Provocation Test (--)   Straight Leg Raise (--)   Neural Tension Test (--)   Crossed Straight Leg Raise (--)   Walking on toes (--)   Walking on heels  (--)       NEUROLOGICAL SCREENING     Sensory deficit: no deficits noted    Reflexes:    Left Right   Patella Tendon 2+ 2+   Achilles Tendon 2+ 2+   Clonus (--) (--)     REPEATED TEST MOVEMENTS:  Repeated Flexion in Standing no effect   Repeated Extension in Standing no effect   Repeated Flexion in lying no effect   Repeated Extension in lying  no effect     Baseline Isometric Testing on Med X equipment: Testing administered by PT  Date of testin2018    ROM 0-54 deg   Max Peak Torque 163    Min Peak Torque 52    Flex/Ext Ratio 3.13:1   % below normative data 30   Counter weight 109   femur 5   Seat pad 0         FOTO: Focus on Therapeutic Outcomes   Category: lumbar   % Impaired: **Will Do Second Visit**  Current Score  = CI = at least 1% but < 20% impaired, limited or restricted  Goal at Discharge Score = CI = at least 1% but < 20% impaired, limited or restricted    Score interpretation is as follows:     TEST SCORE  Modifier  Impairment Limitation Restriction    0/50  CH  0 % impaired, limited or restricted   1-950  CI  @ least 1% but less than 20% impaired, limited or restricted   10-19/50  CJ  @ least 20%<40% impaired, limited or restricted   20-29/50  CK  @ least 40%<60% impaired, limited or restricted   30-39/50  CL  @ least 60% <80% impaired, limited or restricted   40-49/50  CM  @ least 80%<100% impaired limited or restricted   50/50  CN  100% impaired,  limited or restricted       Treatment   Time In: 1:20  Time Out: 2:30    PT Evaluation Completed? Yes  Discussed Plan of Care with patient: Yes      Home Exercise Program as follows:   Handouts were given to the patient. Pt demo good understanding of the education provided. Lavell demonstrated good return demonstration of activities.     - Patient received education regarding proper posture and body mechanics.    - Rebecca roll tried, recommended, and purchase information was provided.    - Patient received a handout regarding anticipated muscular soreness following the isometric test and strategies for management were reviewed with patient including stretching, using ice and scheduled rest.       Pt was instructed in and performed the following:   Cardiovascular exercise and therapeutic exercise to improve posture, lumbar/cervical ROM, strength, and muscular endurance as follows:     Lavell received therapeutic exercises to develop/improve posture, lumbar/cervical ROM, strength and muscular endurance for 30 minutes including the following exercises:     [facility] 4/9/2018   Visit Number 1   VAS Pain Rating 0   Flexion in Lying 10   Lumbar Extension Seat Pad 0   Femur Restraint 5   Top Dead Center 24   Counterweight 109   Lumbar Flexion 54   Lumbar Extension 0   Lumbar Peak Torque 163   Min Torque 52   Percent From Norm -30   Lumbar Weight 60   Repetitions 10         Lavell received the following manual therapy techniques: none were applied to the: 0 minutes.   Assessment   This is a 86 y.o. male referred to [facility] and presents with a medical diagnosis of   Encounter Diagnosis   Name Primary?    Acute left-sided low back pain without sciatica     and demonstrates limitations as described below in the problem list. Pt rehab potential is Excellent. Pt presents with very intermittent/rare back pain in the low L side of the back that is typically intense first thing in the morning that lasts for a  few seconds. Pt is above the age range for the isometric test norms but when age is adjusted he is 30% below norms.     Pain Pattern: Pattern @       Patient received education on the Healthy [facility] program, purpose of the isometric test, progression of back strengthening as well as wellness approach and systemic strengthening.  Details of the program were discussed.  Reviewed that patient should feel support/pressure from med ex restraints but no pain or discomfort and patient expressed understanding.    Based on the above history and physical examination an active physical therapy program is recommended.  Pt will continue to benefit from skilled outpatient physical therapy to address the deficits listed below in the chart, provide pt/family education and to maximize pt's level of independence in the home and community environment. .     No environmental, cultural, spiritual, developmental or education needs expressed or noted    Medical necessity is demonstrated by the following problem list.    Pt presents with the following impairments:   History  Co-morbidities and personal factors that may impact the plan of care Examination  Body Structures and Functions, activity limitations and participation restrictions that may impact the plan of care Clinical Presentation   Decision Making/ Complexity Score   Co-morbidities:   advanced age        Personal Factors:   age Body Regions:   back    Body Systems:   gross symmetry  gross coordinated movement  balance  transfers  motor control  motor learning    Activity limitations:   Learning and applying knowledge  no deficits    General Tasks and Commands  no deficits    Communication  no deficits    Mobility  no deficits    Self care  no deficits    Domestic Life  no deficits    Interactions/Relationships  no deficits    Life Areas  no deficits    Community and Social Life  no deficits    Participation Restrictions:   None     stable and uncomplicated   Low         GOALS: Pt  "is in agreement with the following goals.    Short term goals:  6 weeks or 10 visits   1.  Pt will demonstrate increased lumbar ROM by at least 3 degrees from the initial ROM value with improvements noted in functional ROM and ability to perform ADLs  2.  Pt will demonstrate increased maximum isometric torque value by 10% when compared to the initial value resulting in improved ability to perform bending, lifting, and carrying activities safely, confidently.    3.  Patient report a reduction in worst pain score by 1-2 points for improved tolerance during work and recreational activities  4.  Pt able to perform HEP correctly with minimal cueing or supervision for therapist      Long term goals: 13 weeks or 20 visits   1. Pt will demonstrate increased lumbar ROM by at least 6 degrees from initial ROM value, resulting in improved ability to perform functional fwd bending while standing and sitting.   2. Pt will demonstrate increased maximum isometric torque value by 15-20% when compared to the initial value resulting in improved ability to perform bending, lifting, and carrying activities safely, confidently.  3. Pt to demonstrate ability to independently control and reduce their pain through posture positioning and mechanical movements throughout a typical day.  4.  Patient will demonstrate improved overall function per FOTO Survey to CI = at least 1% but < 20% impaired, limited or restricted score or less.      Plan   Outpatient physical therapy 2x week for 13 weeks or 20 visits to include the following:   - Patient education  - Therapeutic exercise  - Manual therapy  - Performance testing   - Neuromuscular Re-education  - Therapeutic activity   - Modalities    Pt may be seen by PTA as part of the rehabilitation team.     Therapist: Bijal Cline, PT  4/9/2018    "I certify the need for these services furnished under this plan of treatment and while under my " "care."    ____________________________________  Physician/Referring Practitioner    _______________  Date of Signature          "

## 2018-04-11 ENCOUNTER — TELEPHONE (OUTPATIENT)
Dept: INTERNAL MEDICINE | Facility: CLINIC | Age: 83
End: 2018-04-11

## 2018-04-11 NOTE — TELEPHONE ENCOUNTER
----- Message from Violetta Guerrero sent at 4/11/2018 11:15 AM CDT -----  Contact: Patient [phone]  Patient is requesting a call of getting a referral for an orthopedic doctor and the name of one.     Please call and advise.    Thank You

## 2018-04-11 NOTE — TELEPHONE ENCOUNTER
Please tell Dr. Coyle that Dr. Esqueda of Orthopedics returned the message I sent him the last time Dr. Coyle was in the office and after he reviewed the MRI said he would call him and schedule an appt. It looks like they called him yesterday ( Mary Belcher, LPN) but probably did not reach him. I assume they may have left a message or will call today. Please tell him to let us know. L

## 2018-04-12 ENCOUNTER — PATIENT MESSAGE (OUTPATIENT)
Dept: SPINE | Facility: CLINIC | Age: 83
End: 2018-04-12

## 2018-04-15 ENCOUNTER — TELEPHONE (OUTPATIENT)
Dept: INTERNAL MEDICINE | Facility: CLINIC | Age: 83
End: 2018-04-15

## 2018-04-15 NOTE — TELEPHONE ENCOUNTER
----- Message from Dominick Esqueda MD sent at 4/10/2018 10:54 AM CDT -----  Contact: Rosalva Blackwell,    I will have him come and see me  -Dominick    ----- Message -----  From: Rosalva Howard MD  Sent: 4/9/2018  11:20 AM  To: MD Shai Nelson James Clynes has been complaining about his back since October. He has been under the care of Ms. Hernandez. He continued to complain of back pain and therefore I obtained a MRI which I am copying. 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 Do you think he should be seen or is he in the correct therapy.    Thank you,  Rosalva    Impression       Multilevel lumbar spondylosis, resulting in moderate spinal canal stenosis at L2-3, and mild / moderate neural foraminal narrowing from L1-2 through L5-S1, as above.    Prominent degenerative disc disease with large left paracentral disc extrusion at L2-3, which  effaces of the left lateral recess and neural foramen, and possibly impinges upon the exiting left L2 nerve root.    Remote mild L1 compression fracture.    Electronically signed by resident: Zeke Sagastume  Date: 03/29/2018  Time: 16:29    Electronically signed by: Kashmir Lancaster MD  Date: 03/29/2018  Time: 17:42

## 2018-04-16 ENCOUNTER — CLINICAL SUPPORT (OUTPATIENT)
Dept: REHABILITATION | Facility: OTHER | Age: 83
End: 2018-04-16
Attending: PHYSICIAN ASSISTANT
Payer: MEDICARE

## 2018-04-16 ENCOUNTER — OFFICE VISIT (OUTPATIENT)
Dept: UROLOGY | Facility: CLINIC | Age: 83
End: 2018-04-16
Payer: MEDICARE

## 2018-04-16 VITALS
BODY MASS INDEX: 23.39 KG/M2 | HEIGHT: 68 IN | DIASTOLIC BLOOD PRESSURE: 65 MMHG | SYSTOLIC BLOOD PRESSURE: 106 MMHG | HEART RATE: 71 BPM | WEIGHT: 154.31 LBS

## 2018-04-16 DIAGNOSIS — N13.8 BPH WITH URINARY OBSTRUCTION: Primary | ICD-10-CM

## 2018-04-16 DIAGNOSIS — N40.1 BPH WITH URINARY OBSTRUCTION: Primary | ICD-10-CM

## 2018-04-16 DIAGNOSIS — M54.50 ACUTE LEFT-SIDED LOW BACK PAIN WITHOUT SCIATICA: Primary | ICD-10-CM

## 2018-04-16 PROCEDURE — 97110 THERAPEUTIC EXERCISES: CPT

## 2018-04-16 PROCEDURE — 99213 OFFICE O/P EST LOW 20 MIN: CPT | Mod: S$GLB,,, | Performed by: UROLOGY

## 2018-04-16 PROCEDURE — 99999 PR PBB SHADOW E&M-EST. PATIENT-LVL III: CPT | Mod: PBBFAC,,, | Performed by: UROLOGY

## 2018-04-16 NOTE — PROGRESS NOTES
[facility]      Name: Lavell Coyle  Clinic Number: [id number]  Date of Treatment: 2018   Diagnosis: No diagnosis found.  Physician: Adrianne Hernandez PA-C    Pain pattern determined:Pattern 2  Plan of care signed: not signed yet   Time in: 1:30  Time Out: 2:30  Total Treatment time: 50  Precautions: Tortuous Aorta, memory loss  Visit #: 2    POC due:not signed yet  Reassessment due:18  Face to Face discussion of patient was done between PT and PTA.     Subjective   Lavell reports he had LBP this morning, but not at present time. He stated he use to run, but he tries sometimes.     Patient reports their pain to be 0/10 on a 0-10 scale with 0 being no pain and 10 being the worst pain imaginable.    Pain Location: L LB     Work and leisure: Retired Dentist ()   Leisure: Running, weight training                     Pts goals:  To not get worse      Objective     Date of testin2018     ROM 0-54 deg   Max Peak Torque 163    Min Peak Torque 52    Flex/Ext Ratio 3.13:1   % below normative data 30   Counter weight 109   femur 5   Seat pad 0            FOTO: Focus on Therapeutic Outcomes   Category: lumbar   % Impaired: **Will Do Second Visit**  Current Score  = CI = at least 1% but < 20% impaired, limited or restricted  Goal at Discharge Score = CI = at least 1% but < 20% impaired, limited or restricted      Treatment    Pt was instructed in and performed the following:     Lavell received therapeutic exercises to develop/improved posture, cardiovascular endurance, muscular endurance, lumbar/cervical ROM, strength and muscular endurance for 50 minutes including the following exercises:     [facility] 2018   Visit Number 2   VAS Pain Rating 0   Treadmill Time (in min.) 10   Speed 1.5   Flexion in Lying 10   Lumbar Extension Seat Pad -   Femur Restraint -   Top Dead Center -   Counterweight -   Lumbar Flexion -   Lumbar Extension -   Lumbar Peak Torque -    Min Torque -   Percent From Norm -   Lumbar Weight 50   Repetitions 18   Rating of Perceived Exertion 3   Ice - Z Lie (in min.) 10       Peripheral muscle strengthening which included 1 set of 15-20 repetitions at a slow, controlled 7 second per rep pace focused on strengthening supporting musculature for improved body mechanics and functional mobility.  Pt and therapist focused on proper form during treatment to ensure optimal strengthening of each targeted muscle group.  Machines were utilized including torso rotation, leg extension, leg curl, chest press, upright row. Tricep extension, bicep curl, leg press, and hip abduction added on third visit.       Lavell received the following manual therapy techniques: n/a were applied to the: n/a for 0 minutes.       Home Exercise Program as follows:   SHANNA (4/16/18 issued him an HEP sheet)  Handouts were given to the patient. Pt demo good understanding of the education provided. Lavell demonstrated good return demonstration of activities.     Lumbar roll use compliance: not yet, getting soon  Additional exercises taught this treatment session: none  Importance of upright posture and use of lumbar roll.   Assessment     Pt with no LBP pre,during and post session. Per Jodie (), pt did not remember coming to his first session. Pt wanting to go to fast on TM and required (S) and explanation why to decrease speed and maintain upright posture. Discussed importance of upright posture and use of lumbar roll.  Reviewed HEP with max vc for [employer] and issued him an exercise sheet.   Pt demo well, but will require cont education on all aspects therapy to pt having memory loss.  Pt tolerated lumbar medx machine with starting weight 50% of pts max peak torque with no c/o pain. Pt tolerated the medx machines well to the upright row with no c/o increased LB pain or any limb pain. He refused ice despite discussing the importance. Encourage again.       Patient is making good  progress towards established goals.  Pt will continue to benefit from skilled outpatient physical therapy to address the deficits stated in the impairment chart, provide pt/family education and to maximize pt's level of independence in the home and community environment.       Pt's spiritual, cultural and educational needs considered and pt agreeable to plan of care and goals as stated below:     Medical necessity is demonstrated by the following IMPAIRMENTS/PROBLEMS:    History  Co-morbidities and personal factors that may impact the plan of care Examination  Body Structures and Functions, activity limitations and participation restrictions that may impact the plan of care Clinical Presentation    Decision Making/ Complexity Score   Co-morbidities:   advanced age           Personal Factors:   age Body Regions:   back     Body Systems:   gross symmetry  gross coordinated movement  balance  transfers  motor control  motor learning     Activity limitations:   Learning and applying knowledge  no deficits     General Tasks and Commands  no deficits     Communication  no deficits     Mobility  no deficits     Self care  no deficits     Domestic Life  no deficits     Interactions/Relationships  no deficits     Life Areas  no deficits     Community and Social Life  no deficits     Participation Restrictions:   None       stable and uncomplicated    Low            GOALS: Pt is in agreement with the following goals.     Short term goals:  6 weeks or 10 visits   1.  Pt will demonstrate increased lumbar ROM by at least 3 degrees from the initial ROM value with improvements noted in functional ROM and ability to perform ADLs  2.  Pt will demonstrate increased maximum isometric torque value by 10% when compared to the initial value resulting in improved ability to perform bending, lifting, and carrying activities safely, confidently.     3.  Patient report a reduction in worst pain score by 1-2 points for improved tolerance  during work and recreational activities  4.  Pt able to perform HEP correctly with minimal cueing or supervision for therapist        Long term goals: 13 weeks or 20 visits   1. Pt will demonstrate increased lumbar ROM by at least 6 degrees from initial ROM value, resulting in improved ability to perform functional fwd bending while standing and sitting.   2. Pt will demonstrate increased maximum isometric torque value by 15-20% when compared to the initial value resulting in improved ability to perform bending, lifting, and carrying activities safely, confidently.  3. Pt to demonstrate ability to independently control and reduce their pain through posture positioning and mechanical movements throughout a typical day.  4.  Patient will demonstrate improved overall function per FOTO Survey to CI = at least 1% but < 20% impaired, limited or restricted score or less.         Plan   Continue with established Plan of Care towards established PT goals.

## 2018-04-16 NOTE — PROGRESS NOTES
Subjective:       Patient ID: Lavell Coyle is a 86 y.o. male.    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  He has a retired orthodontist.  He has a good stream and empties his bladder well.  His last PSA in 2016 was 0.55 he occasionally has some burning on ejaculation.  No history of hematuria    Past Medical History:   Diagnosis Date    Allergic blepharitis 5/20/2014    Basal cell cancer     History of mononucleosis     pupura    History of osteopenia     Hypothyroidism     Melanoma     Osteopenia     Prostate hypertrophy     Thrombocytopenia 2002    Thrombocytopenia 2002    Thyroid disease     Thyroid nodule 07/2009    left nodule       Past Surgical History:   Procedure Laterality Date    CARPAL TUNNEL RELEASE      left    KNEE ARTHROSCOPY      KNEE SURGERY      Moh      surgery    ROTATOR CUFF REPAIR      right    SKIN CANCER EXCISION         Family History   Problem Relation Age of Onset    Cancer Other     Alcohol abuse Father     Prostate cancer Neg Hx     Kidney disease Neg Hx        Social History     Social History    Marital status:      Spouse name: N/A    Number of children: N/A    Years of education: N/A     Occupational History    dentist, peridontist.      Social History Main Topics    Smoking status: Never Smoker    Smokeless tobacco: Never Used    Alcohol use 0.6 oz/week     1 Glasses of wine per week      Comment: ONCE DAILY    Drug use: No    Sexual activity: Not Currently     Other Topics Concern    Not on file     Social History Narrative    No narrative on file       Allergies:  Patient has no known allergies.    Medications:    Current Outpatient Prescriptions:     calcium citrate-vitamin D3 315-200 mg (CITRACAL+D) 315-200 mg-unit per tablet, Take 1 tablet by mouth once daily., Disp: , Rfl:     IBUPROFEN/DIPHENHYDRAMINE CIT (ADVIL PM ORAL), Take by  mouth., Disp: , Rfl:     levothyroxine (SYNTHROID) 50 MCG tablet, TAKE 1 TABLET BY MOUTH EVERY DAY, Disp: 30 tablet, Rfl: 12    MULTIVITAMIN ORAL, Take by mouth., Disp: , Rfl:     OMEGA-3/DHA/EPA/FISH OIL (OMEGA-3 ORAL), Take by mouth., Disp: , Rfl:     saw palmetto 160 MG capsule, Take 160 mg by mouth 2 (two) times daily.  , Disp: , Rfl:     Review of Systems   Constitutional: Negative for activity change, appetite change, chills, diaphoresis, fatigue, fever and unexpected weight change.   HENT: Negative for congestion, dental problem, hearing loss, mouth sores, postnasal drip, rhinorrhea, sinus pressure and trouble swallowing.    Eyes: Negative for pain, discharge and itching.   Respiratory: Negative for apnea, cough, choking, chest tightness, shortness of breath and wheezing.    Cardiovascular: Negative for chest pain, palpitations and leg swelling.   Gastrointestinal: Negative for abdominal distention, abdominal pain, anal bleeding, blood in stool, constipation, diarrhea, nausea, rectal pain and vomiting.   Endocrine: Negative for polydipsia and polyuria.   Genitourinary: Negative for decreased urine volume, difficulty urinating, discharge, dysuria, enuresis, flank pain, frequency, genital sores, hematuria, penile pain, penile swelling, scrotal swelling, testicular pain and urgency.   Musculoskeletal: Negative for arthralgias, back pain and myalgias.   Skin: Negative for color change, rash and wound.   Neurological: Negative for dizziness, syncope, speech difficulty, light-headedness and headaches.   Hematological: Negative for adenopathy. Does not bruise/bleed easily.   Psychiatric/Behavioral: Negative for behavioral problems, confusion, hallucinations and sleep disturbance.       Objective:      Physical Exam   Constitutional: He appears well-developed.   HENT:   Head: Normocephalic.   Cardiovascular: Normal rate.    Pulmonary/Chest: Effort normal.   Abdominal: Soft.   Genitourinary: Prostate normal.    Genitourinary Comments: 30 g benign no nodules   Neurological: He is alert.   Skin: Skin is warm.     Psychiatric: He has a normal mood and affect.       Assessment:       1. BPH with urinary obstruction        Plan:       Lavell was seen today for bph with urinary obstruction.    Diagnoses and all orders for this visit:    BPH with urinary obstruction     yearly check.  Patient will continue taking saw palmetto twice a day as he has for the past 20 years

## 2018-04-18 ENCOUNTER — CLINICAL SUPPORT (OUTPATIENT)
Dept: REHABILITATION | Facility: OTHER | Age: 83
End: 2018-04-18
Attending: PHYSICIAN ASSISTANT
Payer: MEDICARE

## 2018-04-18 DIAGNOSIS — M54.50 ACUTE LEFT-SIDED LOW BACK PAIN WITHOUT SCIATICA: Primary | ICD-10-CM

## 2018-04-18 PROCEDURE — 97110 THERAPEUTIC EXERCISES: CPT

## 2018-04-18 NOTE — PROGRESS NOTES
[facility]      Name: Lavell Coyle  Clinic Number: [id number]  Date of Treatment: 2018   Diagnosis: No diagnosis found.  Physician: Adrianne Hernandez PA-C    Pain pattern determined:Pattern 2  Plan of care signed: not signed yet   Time in: 10:30  Time Out: 11:30  Total Treatment time: 50  Precautions: Tortuous Aorta, memory loss  Visit #: 3    POC due:not signed yet  Reassessment due:18  Face to Face discussion of patient was done between PT and PTA.     Subjective   Lavell reports he had LBP this morning, but not at present time.     Patient reports their pain to be 0/10 on a 0-10 scale with 0 being no pain and 10 being the worst pain imaginable.    Pain Location: L LB     Work and leisure: Retired Dentist ()   Leisure: Running, weight training                     Pts goals:  To not get worse      Objective     Date of testin2018     ROM 0-54 deg   Max Peak Torque 163    Min Peak Torque 52    Flex/Ext Ratio 3.13:1   % below normative data 30   Counter weight 109   femur 5   Seat pad 0            FOTO: Focus on Therapeutic Outcomes   Category: lumbar   % Impaired: **Will Do Second Visit**  Current Score  = CI = at least 1% but < 20% impaired, limited or restricted  Goal at Discharge Score = CI = at least 1% but < 20% impaired, limited or restricted      Treatment    Pt was instructed in and performed the following:     Lavell received therapeutic exercises to develop/improved posture, cardiovascular endurance, muscular endurance, lumbar/cervical ROM, strength and muscular endurance for 50 minutes including the following exercises:     [facility] 2018   Visit Number 3   VAS Pain Rating 0   Treadmill Time (in min.) 10   Speed 2.4   Flexion in Lying 10   Lumbar Extension Seat Pad -   Femur Restraint -   Top Dead Center -   Counterweight -   Lumbar Flexion -   Lumbar Extension -   Lumbar Peak Torque -   Min Torque -   Percent From Norm -   Lumbar  Weight 50   Repetitions 20   Rating of Perceived Exertion 3   Ice - Z Lie (in min.) 10   Open Book 10x      Peripheral muscle strengthening which included 1 set of 15-20 repetitions at a slow, controlled 7 second per rep pace focused on strengthening supporting musculature for improved body mechanics and functional mobility.  Pt and therapist focused on proper form during treatment to ensure optimal strengthening of each targeted muscle group.  Machines were utilized including torso rotation, leg extension, leg curl, chest press, upright row. Tricep extension, bicep curl, leg press, and hip abduction added on third visit.       Lavell received the following manual therapy techniques: n/a were applied to the: n/a for 0 minutes.       Home Exercise Program as follows:   SHANNA (4/16/18 issued him an HEP sheet)  Importance of upright posture and use of lumbar roll.   Handouts were given to the patient. Pt demo good understanding of the education provided. Lavell demonstrated good return demonstration of activities.     Lumbar roll use compliance: not yet, getting soon  Additional exercises taught this treatment session: none  Open Book 10x (Pt did this stretch before in other therapy and he remembers it.)  Assessment     Pt with no LBP pre,during and post session. Pt amb on TM with (S) for safety with vc for more upright posture.  Pt did not remember SHANNA,so reviewed again. Added open book due to he likes it and has done it before.   Pt demo well, but will require cont education on all aspects therapy due to pt having memory loss.  Pt tolerated lumbar medx machine with the same weight with no c/o pain. Vc to sloe pace down with lumbar and peripheral machines.  He refused ice despite discussing the importance. Encourage again.       Patient is making good progress towards established goals.  Pt will continue to benefit from skilled outpatient physical therapy to address the deficits stated in the impairment chart, provide  pt/family education and to maximize pt's level of independence in the home and community environment.       Pt's spiritual, cultural and educational needs considered and pt agreeable to plan of care and goals as stated below:     Medical necessity is demonstrated by the following IMPAIRMENTS/PROBLEMS:    History  Co-morbidities and personal factors that may impact the plan of care Examination  Body Structures and Functions, activity limitations and participation restrictions that may impact the plan of care Clinical Presentation    Decision Making/ Complexity Score   Co-morbidities:   advanced age           Personal Factors:   age Body Regions:   back     Body Systems:   gross symmetry  gross coordinated movement  balance  transfers  motor control  motor learning     Activity limitations:   Learning and applying knowledge  no deficits     General Tasks and Commands  no deficits     Communication  no deficits     Mobility  no deficits     Self care  no deficits     Domestic Life  no deficits     Interactions/Relationships  no deficits     Life Areas  no deficits     Community and Social Life  no deficits     Participation Restrictions:   None       stable and uncomplicated    Low            GOALS: Pt is in agreement with the following goals.     Short term goals:  6 weeks or 10 visits   1.  Pt will demonstrate increased lumbar ROM by at least 3 degrees from the initial ROM value with improvements noted in functional ROM and ability to perform ADLs  2.  Pt will demonstrate increased maximum isometric torque value by 10% when compared to the initial value resulting in improved ability to perform bending, lifting, and carrying activities safely, confidently.     3.  Patient report a reduction in worst pain score by 1-2 points for improved tolerance during work and recreational activities  4.  Pt able to perform HEP correctly with minimal cueing or supervision for therapist        Long term goals: 13 weeks or 20 visits    1. Pt will demonstrate increased lumbar ROM by at least 6 degrees from initial ROM value, resulting in improved ability to perform functional fwd bending while standing and sitting.   2. Pt will demonstrate increased maximum isometric torque value by 15-20% when compared to the initial value resulting in improved ability to perform bending, lifting, and carrying activities safely, confidently.  3. Pt to demonstrate ability to independently control and reduce their pain through posture positioning and mechanical movements throughout a typical day.  4.  Patient will demonstrate improved overall function per FOTO Survey to CI = at least 1% but < 20% impaired, limited or restricted score or less.         Plan   Continue with established Plan of Care towards established PT goals.

## 2018-04-19 ENCOUNTER — OFFICE VISIT (OUTPATIENT)
Dept: INTERNAL MEDICINE | Facility: CLINIC | Age: 83
End: 2018-04-19
Payer: MEDICARE

## 2018-04-19 VITALS
SYSTOLIC BLOOD PRESSURE: 110 MMHG | WEIGHT: 157.19 LBS | HEIGHT: 68 IN | DIASTOLIC BLOOD PRESSURE: 68 MMHG | HEART RATE: 65 BPM | OXYGEN SATURATION: 97 % | BODY MASS INDEX: 23.82 KG/M2

## 2018-04-19 DIAGNOSIS — S32.010D CLOSED COMPRESSION FRACTURE OF L1 LUMBAR VERTEBRA WITH ROUTINE HEALING, SUBSEQUENT ENCOUNTER: ICD-10-CM

## 2018-04-19 DIAGNOSIS — M81.0 AGE RELATED OSTEOPOROSIS, UNSPECIFIED PATHOLOGICAL FRACTURE PRESENCE: Primary | ICD-10-CM

## 2018-04-19 DIAGNOSIS — G31.84 MCI (MILD COGNITIVE IMPAIRMENT) WITH MEMORY LOSS: ICD-10-CM

## 2018-04-19 PROCEDURE — 99214 OFFICE O/P EST MOD 30 MIN: CPT | Mod: S$GLB,,, | Performed by: INTERNAL MEDICINE

## 2018-04-19 PROCEDURE — 99999 PR PBB SHADOW E&M-EST. PATIENT-LVL III: CPT | Mod: PBBFAC,,, | Performed by: INTERNAL MEDICINE

## 2018-04-19 NOTE — PROGRESS NOTES
Subjective:      Patient ID: Lavell Coyle is a 86 y.o. male.    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  I brought him back to discuss his follow-up for back pain and will discuss an evaluation for osteopenia/osteoporosis in light of the compression fracture that was found.    His MRI was discussed at the last appointment and he was given a copy of the report.  He does remember the compression fracture and also the disc disease reported.  He is currently going to physical therapy and has a follow-up in back and spine clinic.    Today I discussed with him that I would like him to be seen in endocrine clinic for treatment of osteopenia/osteoporosis and with his short-term memory problems I believe that the medication is indicated that Prolia would be best as people with short-term memory problems have difficulty with medications.  Patient Active Problem List   Diagnosis    BPH with urinary obstruction    Thyroid nodule    Osteopenia    Skin cancer    History of melanoma excision    Basal cell cancer    MCI (mild cognitive impairment) with memory loss    Hypothyroidism    Medial meniscus tear    S/P R knee surgery, medial/lateral menisectomy, loose body removal    Range of motion deficit    Decreased strength    Nuclear cataract    Ocular migraine    Blepharitis of both eyes    Bilateral dry eyes    Memory loss    Tortuous aorta    Meningiomas, multiple    Closed compression fracture of first lumbar vertebra    Acute left-sided low back pain without sciatica     Past Medical History:   Diagnosis Date    Allergic blepharitis 5/20/2014    Basal cell cancer     History of mononucleosis     pupura    History of osteopenia     Hypothyroidism     Melanoma     Osteopenia     Prostate hypertrophy     Thrombocytopenia 2002    Thrombocytopenia 2002    Thyroid disease     Thyroid nodule 07/2009    left nodule     Past Surgical History:  "  Procedure Laterality Date    CARPAL TUNNEL RELEASE      left    KNEE ARTHROSCOPY      KNEE SURGERY      Moh      surgery    ROTATOR CUFF REPAIR      right    SKIN CANCER EXCISION       Family History   Problem Relation Age of Onset    Cancer Other     Alcohol abuse Father     Prostate cancer Neg Hx     Kidney disease Neg Hx      Review of Systems   Constitutional: Negative for activity change, chills, fever and unexpected weight change.   Respiratory: Negative for cough, chest tightness, shortness of breath and wheezing.    Cardiovascular: Negative for chest pain, palpitations and leg swelling.   Musculoskeletal: Positive for back pain.     Objective:     Vitals:    04/19/18 1451   BP: 110/68   Pulse: 65   SpO2: 97%   Weight: 71.3 kg (157 lb 3 oz)   Height: 5' 8" (1.727 m)   PainSc: 0-No pain     Body mass index is 23.9 kg/m².  Physical Exam   Constitutional: He appears well-developed and well-nourished. No distress.   Neck: Carotid bruit is not present. No thyromegaly present.   Cardiovascular: Normal rate, regular rhythm and normal heart sounds.    Pulmonary/Chest: Effort normal and breath sounds normal. No respiratory distress.   Musculoskeletal: He exhibits no edema.     Assessment:     1. Age related osteoporosis, unspecified pathological fracture presence    2. Closed compression fracture of L1 lumbar vertebra with routine healing, subsequent encounter    3. MCI (mild cognitive impairment) with memory loss      Plan:   Lavell was seen today for follow-up.    Diagnoses and all orders for this visit:    Age related osteoporosis, unspecified pathological fracture presence: Bone density done in 2016 recent fracture found on MRI  -     Ambulatory consult to Endocrinology    Closed compression fracture of L1 lumbar vertebra with routine healing, subsequent encounter: MRI  -     Ambulatory consult to Endocrinology    MCI (mild cognitive impairment) with memory loss: Patient did not improve on medication " and thus has stopped the medication he does go to the memory program at the [facility] on Tuesdays.  Mind matters      Follow-up in about 6 months (around 10/19/2018) for Follow up.     Medication List          Accurate as of 4/19/18  3:17 PM. If you have any questions, ask your nurse or doctor.               CONTINUE taking these medications    ADVIL PM ORAL     calcium citrate-vitamin D3 315-200 mg 315-200 mg-unit per tablet  Commonly known as:  CITRACAL+D     levothyroxine 50 MCG tablet  Commonly known as:  SYNTHROID  TAKE 1 TABLET BY MOUTH EVERY DAY     MULTIVITAMIN ORAL     OMEGA-3 ORAL     saw palmetto 160 MG capsule

## 2018-04-25 ENCOUNTER — CLINICAL SUPPORT (OUTPATIENT)
Dept: REHABILITATION | Facility: OTHER | Age: 83
End: 2018-04-25
Attending: PHYSICIAN ASSISTANT
Payer: MEDICARE

## 2018-04-25 DIAGNOSIS — M54.50 ACUTE LEFT-SIDED LOW BACK PAIN WITHOUT SCIATICA: Primary | ICD-10-CM

## 2018-04-25 PROCEDURE — 97110 THERAPEUTIC EXERCISES: CPT

## 2018-04-25 NOTE — PATIENT INSTRUCTIONS
Bridging: with Straight Leg Raise        With legs bent, lift buttocks from floor. Then slowly extend right knee, keeping stomach tight.  Repeat 20 times. Do 3-4 sessions per week.     [email or web address]     Copyright © [employer]. All rights reserved.      Straight Leg Raise (Prone)        Abdomen and head supported, keep left knee locked and raise leg at hip. Avoid arching low back.  Repeat 20 times. Do 3-4 sessions per week.     [email or web address]     Copyright © [employer]. All rights reserved.        Clam Shells    Lie on side with knees bent. Raise top leg, keeping knee bent and ankles together. Do not let your hips roll back as your raise your leg.  Repeat 20 times each leg. Do 3-4 sessions per week.    Knee-to-Chest Stretch: Bilateral        With hands behind knees, pull both knees in to chest until a comfortable stretch is felt in lower back and buttocks. Keep back relaxed. Hold 10 seconds.  Repeat 10  Times.Do 2-3 sessions per day.     [email or web address]     Copyright © [employer]. All rights reserved.            Lower Trunk Rotation Stretch        Keeping back flat and feet together, rotate knees to left side. Hold 3 seconds. Repeat on R side  Repeat 10  Times.Do 2-3 sessions per day.     [email or web address]     Copyright © [employer]. All rights reserved.         Pelvic Tilt: Posterior - Legs Bent (Supine)        Tighten stomach and flatten back by rolling pelvis down. Hold 3 seconds. Relax.  Repeat 10  Times.Do 2-3 sessions per day.     [email or web address]     Copyright © [employer]. All rights reserved.        Lumbar Rotation: Caudal - Bilateral (Supine)        Start laying on side Feet and knees together, arms sandwiched, rotate top arm left, turning head in same direction, until stretch is felt. Hold 3 seconds. Relax.  Repeat 10  Times.Do 2-3 sessions per day.     [email or web address]     Copyright © [employer]. All rights reserved.

## 2018-04-25 NOTE — PROGRESS NOTES
[facility]      Name: Lavell Coyle  Clinic Number: [id number]  Date of Treatment: 2018   Diagnosis:   Encounter Diagnosis   Name Primary?    Acute left-sided low back pain without sciatica Yes     Physician: Adrianne Hernandez PA-C    Pain pattern determined:Pattern 2  Plan of care signed: not signed yet   Time in: 10:30  Time Out: 11:30  Total Treatment time: 50  Precautions: Tortuous Aorta, memory loss  Visit #: 4    POC due: not signed yet (resent 2018)  Reassessment due:18  Face to Face discussion of patient was done between PT and PTA.     Subjective   Lavell reports he had LBP this morning, but not at present time.     Patient reports their pain to be 0/10 on a 0-10 scale with 0 being no pain and 10 being the worst pain imaginable.    Pain Location: L LB     Work and leisure: Retired Dentist ()   Leisure: Running, weight training                     Pts goals:  To not get worse      Objective     Date of testin2018     ROM 0-54 deg   Max Peak Torque 163    Min Peak Torque 52    Flex/Ext Ratio 3.13:1   % below normative data 30   Counter weight 109   femur 5   Seat pad 0            FOTO: Focus on Therapeutic Outcomes   Category: lumbar   % Impaired: **Will Do Second Visit**  Current Score  = CI = at least 1% but < 20% impaired, limited or restricted  Goal at Discharge Score = CI = at least 1% but < 20% impaired, limited or restricted      Treatment    Pt was instructed in and performed the following:     Lavell received therapeutic exercises to develop/improved posture, cardiovascular endurance, muscular endurance, lumbar/cervical ROM, strength and muscular endurance for 50 minutes including the following exercises:     [facility] 2018   Visit Number 4   VAS Pain Rating 0   Treadmill Time (in min.) 10   Speed 2   Flexion in Lying 10   Lumbar Extension Seat Pad -   Femur Restraint -   Top Dead Center -   Counterweight -   Lumbar Flexion -    Lumbar Extension -   Lumbar Peak Torque -   Min Torque -   Percent From Norm -   Lumbar Weight 52   Repetitions 20   Rating of Perceived Exertion 3   Ice - Z Lie (in min.) -     LTR 10x  PPT 10x  OB 10x  Bridge with kick 10x  Clam shells 10x  Prone hip extension 10x      Peripheral muscle strengthening which included 1 set of 15-20 repetitions at a slow, controlled 7 second per rep pace focused on strengthening supporting musculature for improved body mechanics and functional mobility.  Pt and therapist focused on proper form during treatment to ensure optimal strengthening of each targeted muscle group.  Machines were utilized including torso rotation, leg extension, leg curl, chest press, upright row. Tricep extension, bicep curl, leg press, and hip abduction added on third visit.       Lavell received the following manual therapy techniques: n/a were applied to the: n/a for 0 minutes.       Home Exercise Program as follows:   SHANNA (4/16/18 issued him an HEP sheet)  Bridge with kick 10x  Clam shells 10x  Prone hip extension 10x   Pt was given another full HEP printout 4/25/2018    Importance of upright posture and use of lumbar roll.   Handouts were given to the patient. Pt demo good understanding of the education provided. Lavell demonstrated good return demonstration of activities.     Lumbar roll use compliance: not yet, getting soon  Additional exercises taught this treatment session: none  Open Book 10x (Pt did this stretch before in other therapy and he remembers it.)  Assessment     Pt with no LBP pre,during and post session. Pt was able to complete exercises without difficulty and has been provided an additional handout with updated exercise. He was able to increase resistance on the lumbar MedX by 5%, and will increase again next visit. Does not like to do cryo      Patient is making good progress towards established goals.  Pt will continue to benefit from skilled outpatient physical therapy to address  the deficits stated in the impairment chart, provide pt/family education and to maximize pt's level of independence in the home and community environment.       Pt's spiritual, cultural and educational needs considered and pt agreeable to plan of care and goals as stated below:     Medical necessity is demonstrated by the following IMPAIRMENTS/PROBLEMS:    History  Co-morbidities and personal factors that may impact the plan of care Examination  Body Structures and Functions, activity limitations and participation restrictions that may impact the plan of care Clinical Presentation    Decision Making/ Complexity Score   Co-morbidities:   advanced age           Personal Factors:   age Body Regions:   back     Body Systems:   gross symmetry  gross coordinated movement  balance  transfers  motor control  motor learning     Activity limitations:   Learning and applying knowledge  no deficits     General Tasks and Commands  no deficits     Communication  no deficits     Mobility  no deficits     Self care  no deficits     Domestic Life  no deficits     Interactions/Relationships  no deficits     Life Areas  no deficits     Community and Social Life  no deficits     Participation Restrictions:   None       stable and uncomplicated    Low            GOALS: Pt is in agreement with the following goals.     Short term goals:  6 weeks or 10 visits   1.  Pt will demonstrate increased lumbar ROM by at least 3 degrees from the initial ROM value with improvements noted in functional ROM and ability to perform ADLs  2.  Pt will demonstrate increased maximum isometric torque value by 10% when compared to the initial value resulting in improved ability to perform bending, lifting, and carrying activities safely, confidently.     3.  Patient report a reduction in worst pain score by 1-2 points for improved tolerance during work and recreational activities  4.  Pt able to perform HEP correctly with minimal cueing or supervision for  therapist        Long term goals: 13 weeks or 20 visits   1. Pt will demonstrate increased lumbar ROM by at least 6 degrees from initial ROM value, resulting in improved ability to perform functional fwd bending while standing and sitting.   2. Pt will demonstrate increased maximum isometric torque value by 15-20% when compared to the initial value resulting in improved ability to perform bending, lifting, and carrying activities safely, confidently.  3. Pt to demonstrate ability to independently control and reduce their pain through posture positioning and mechanical movements throughout a typical day.  4.  Patient will demonstrate improved overall function per FOTO Survey to CI = at least 1% but < 20% impaired, limited or restricted score or less.         Plan   Continue with established Plan of Care towards established PT goals.

## 2018-04-30 ENCOUNTER — CLINICAL SUPPORT (OUTPATIENT)
Dept: REHABILITATION | Facility: OTHER | Age: 83
End: 2018-04-30
Attending: PHYSICIAN ASSISTANT
Payer: MEDICARE

## 2018-04-30 DIAGNOSIS — M54.50 ACUTE LEFT-SIDED LOW BACK PAIN WITHOUT SCIATICA: Primary | ICD-10-CM

## 2018-04-30 PROCEDURE — 97110 THERAPEUTIC EXERCISES: CPT

## 2018-04-30 NOTE — PROGRESS NOTES
[facility]      Name: Lavell Coyle  Clinic Number: [id number]  Date of Treatment: 2018   Diagnosis:   Encounter Diagnosis   Name Primary?    Acute left-sided low back pain without sciatica Yes     Physician: Adrianne Hernandez PA-C    Pain pattern determined:Pattern 2  Plan of care signed: 2018   Time in: 10:40  Time Out: 11:20  Total Treatment time: 30  Precautions: Tortuous Aorta, memory loss  Visit #: 5    POC due: 2018    Reassessment due:18  Face to Face discussion of patient was done between PT and PTA.     Subjective   Lavell reports no LBP at this time     Patient reports their pain to be 0/10 on a 0-10 scale with 0 being no pain and 10 being the worst pain imaginable.    Pain Location: L LB     Work and leisure: Retired Dentist ()   Leisure: Running, weight training                     Pts goals:  To not get worse      Objective     Date of testin2018     ROM 0-54 deg   Max Peak Torque 163    Min Peak Torque 52    Flex/Ext Ratio 3.13:1   % below normative data 30   Counter weight 109   femur 5   Seat pad 0            FOTO: Focus on Therapeutic Outcomes   Category: lumbar   % Impaired: **Will Do Second Visit**  Current Score  = CI = at least 1% but < 20% impaired, limited or restricted  Goal at Discharge Score = CI = at least 1% but < 20% impaired, limited or restricted      Treatment    Pt was instructed in and performed the following:     Lavell received therapeutic exercises to develop/improved posture, cardiovascular endurance, muscular endurance, lumbar/cervical ROM, strength and muscular endurance for 50 minutes including the following exercises:     [facility] 2018   Visit Number 5   VAS Pain Rating 0   Treadmill Time (in min.) 10   Speed 2   Flexion in Lying 10   Lumbar Extension Seat Pad -   Femur Restraint -   Top Dead Center -   Counterweight -   Lumbar Flexion -   Lumbar Extension -   Lumbar Peak Torque -   Min Torque  -   Percent From Norm -   Lumbar Weight 54   Repetitions 20   Rating of Perceived Exertion 3   Ice - Z Lie (in min.) -         LTR 10x  PPT 10x  OB 10x  Bridge with kick 10x  Clam shells 10x  Prone hip extension 10x      Peripheral muscle strengthening which included 1 set of 15-20 repetitions at a slow, controlled 7 second per rep pace focused on strengthening supporting musculature for improved body mechanics and functional mobility.  Pt and therapist focused on proper form during treatment to ensure optimal strengthening of each targeted muscle group.  Machines were utilized including torso rotation, leg extension, leg curl, chest press, upright row. Tricep extension, bicep curl, leg press, and hip abduction added on third visit.       Lavell received the following manual therapy techniques: n/a were applied to the: n/a for 0 minutes.       Home Exercise Program as follows:   SHANNA (4/16/18 issued him an HEP sheet)  Bridge with kick 10x  Clam shells 10x  Prone hip extension 10x   Pt was given another full HEP printout 4/25/2018    Importance of upright posture and use of lumbar roll.   Handouts were given to the patient. Pt demo good understanding of the education provided. Lavell demonstrated good return demonstration of activities.     Lumbar roll use compliance: not yet, getting soon  Additional exercises taught this treatment session: none  Open Book 10x (Pt did this stretch before in other therapy and he remembers it.)  Assessment     Pt with no LBP pre,during and post session. Pt was able to complete exercises without difficulty and has been provided a handout with updated exercise, he forgot the last one here at therapy. He needed more cues this visit for instruction on completing the home exercise and the Lumbar MedX.  He was able to increase resistance on the lumbar MedX by 5%, and will increase again next visit. Does not like to do cryo      Patient is making good progress towards established goals.  Pt  will continue to benefit from skilled outpatient physical therapy to address the deficits stated in the impairment chart, provide pt/family education and to maximize pt's level of independence in the home and community environment.       Pt's spiritual, cultural and educational needs considered and pt agreeable to plan of care and goals as stated below:     Medical necessity is demonstrated by the following IMPAIRMENTS/PROBLEMS:    History  Co-morbidities and personal factors that may impact the plan of care Examination  Body Structures and Functions, activity limitations and participation restrictions that may impact the plan of care Clinical Presentation    Decision Making/ Complexity Score   Co-morbidities:   advanced age           Personal Factors:   age Body Regions:   back     Body Systems:   gross symmetry  gross coordinated movement  balance  transfers  motor control  motor learning     Activity limitations:   Learning and applying knowledge  no deficits     General Tasks and Commands  no deficits     Communication  no deficits     Mobility  no deficits     Self care  no deficits     Domestic Life  no deficits     Interactions/Relationships  no deficits     Life Areas  no deficits     Community and Social Life  no deficits     Participation Restrictions:   None       stable and uncomplicated    Low            GOALS: Pt is in agreement with the following goals.     Short term goals:  6 weeks or 10 visits   1.  Pt will demonstrate increased lumbar ROM by at least 3 degrees from the initial ROM value with improvements noted in functional ROM and ability to perform ADLs  2.  Pt will demonstrate increased maximum isometric torque value by 10% when compared to the initial value resulting in improved ability to perform bending, lifting, and carrying activities safely, confidently.     3.  Patient report a reduction in worst pain score by 1-2 points for improved tolerance during work and recreational activities  4.   Pt able to perform HEP correctly with minimal cueing or supervision for therapist        Long term goals: 13 weeks or 20 visits   1. Pt will demonstrate increased lumbar ROM by at least 6 degrees from initial ROM value, resulting in improved ability to perform functional fwd bending while standing and sitting.   2. Pt will demonstrate increased maximum isometric torque value by 15-20% when compared to the initial value resulting in improved ability to perform bending, lifting, and carrying activities safely, confidently.  3. Pt to demonstrate ability to independently control and reduce their pain through posture positioning and mechanical movements throughout a typical day.  4.  Patient will demonstrate improved overall function per FOTO Survey to CI = at least 1% but < 20% impaired, limited or restricted score or less.         Plan   Continue with established Plan of Care towards established PT goals.

## 2018-05-07 ENCOUNTER — CLINICAL SUPPORT (OUTPATIENT)
Dept: REHABILITATION | Facility: OTHER | Age: 83
End: 2018-05-07
Attending: PHYSICIAN ASSISTANT
Payer: MEDICARE

## 2018-05-07 DIAGNOSIS — M54.50 ACUTE LEFT-SIDED LOW BACK PAIN WITHOUT SCIATICA: Primary | ICD-10-CM

## 2018-05-07 PROCEDURE — 97110 THERAPEUTIC EXERCISES: CPT

## 2018-05-07 NOTE — PROGRESS NOTES
[facility]      Name: Lavell Coyle  Clinic Number: [id number]  Date of Treatment: 2018   Diagnosis:   Encounter Diagnosis   Name Primary?    Acute left-sided low back pain without sciatica Yes     Physician: Adrianne Hernandez PA-C    Pain pattern determined:Pattern 2  Plan of care signed: 2018   Time in: 10:37  Time Out: 11:30  Total Treatment time: 30  Precautions: Tortuous Aorta, memory loss  Visit #: 6    POC due: 2018    Reassessment done:18  Reassessment done:18    Face to Face discussion of patient was done between PT and PTA.     Subjective   Lavell reports no LBP at this time. He was not sore after last session. He is doing HEP but it is hard for him to recall all exercises.     Patient reports their pain to be 0/10 on a 0-10 scale with 0 being no pain and 10 being the worst pain imaginable.    Pain Location: L LB     Work and leisure: Retired Dentist ()   Leisure: Running, weight training                     Pts goals:  To not get worse      Objective     Date of testin2018     ROM 0-54 deg   Max Peak Torque 163    Min Peak Torque 52    Flex/Ext Ratio 3.13:1   % below normative data 30   Counter weight 109   femur 5   Seat pad 0            FOTO: Focus on Therapeutic Outcomes   Category: lumbar   % Impaired: **Will Do Second Visit**  Current Score  = CI = at least 1% but < 20% impaired, limited or restricted  Goal at Discharge Score = CI = at least 1% but < 20% impaired, limited or restricted      Treatment    Pt was instructed in and performed the following:     Lavell received therapeutic exercises to develop/improved posture, cardiovascular endurance, muscular endurance, lumbar/cervical ROM, strength and muscular endurance for 50 minutes including the following exercises:   [facility] 2018   Visit Number 6   VAS Pain Rating 0   Treadmill Time (in min.) 10   Speed 2   Flexion in Lying 10   Lumbar Extension Seat Pad -   Femur  Restraint -   Top Dead Center -   Counterweight -   Lumbar Flexion -   Lumbar Extension -   Lumbar Peak Torque -   Min Torque -   Percent From Norm -   Lumbar Weight 57   Repetitions 20   Rating of Perceived Exertion 3   Ice - Z Lie (in min.) -     LTR 10x  PPT 10x  OB 10x  Bridge 10x  Clam shells 10x  Prone hip extension 10x    Peripheral muscle strengthening which included 1 set of 15-20 repetitions at a slow, controlled 7 second per rep pace focused on strengthening supporting musculature for improved body mechanics and functional mobility.  Pt and therapist focused on proper form during treatment to ensure optimal strengthening of each targeted muscle group.  Machines were utilized including torso rotation, leg extension, leg curl, chest press, upright row. Tricep extension, bicep curl, leg press, and hip abduction added on third visit.       Lavell received the following manual therapy techniques: n/a were applied to the: n/a for 0 minutes.       Home Exercise Program as follows:   SHANNA 10x   Clam shells 10x  Prone hip extension 10x  LTR 10x  PPT 10x  Bridge 10x  Open Book 10x       Pt was given  full HEP printout 4/25/2018, 5/7/18  Handouts were given to the patient. Pt demo good understanding of the education provided. Lavell demonstrated good return demonstration of activities.     Lumbar roll use compliance: not yet, getting soon  Additional exercises taught this treatment session: none  (Pt did this stretch before in other therapy and he remembers it.)  Assessment     Pt with no LBP pre,during and post session. Pt able to perform HEP with moderate v/c for technique and recall. Pt has been given multiple copies of HEP but continues to report fair recall of exercises and inconsistent compliance with HEP. Reissued HEP again today with full summery of exercises. He was able to increase resistance on the lumbar MedX by 5%, and will increase again next visit as tolerated. Does not like to do cryo.     Patient is  making good progress towards established goals.  Pt will continue to benefit from skilled outpatient physical therapy to address the deficits stated in the impairment chart, provide pt/family education and to maximize pt's level of independence in the home and community environment.       Pt's spiritual, cultural and educational needs considered and pt agreeable to plan of care and goals as stated below:     Medical necessity is demonstrated by the following IMPAIRMENTS/PROBLEMS:    History  Co-morbidities and personal factors that may impact the plan of care Examination  Body Structures and Functions, activity limitations and participation restrictions that may impact the plan of care Clinical Presentation    Decision Making/ Complexity Score   Co-morbidities:   advanced age           Personal Factors:   age Body Regions:   back     Body Systems:   gross symmetry  gross coordinated movement  balance  transfers  motor control  motor learning     Activity limitations:   Learning and applying knowledge  no deficits     General Tasks and Commands  no deficits     Communication  no deficits     Mobility  no deficits     Self care  no deficits     Domestic Life  no deficits     Interactions/Relationships  no deficits     Life Areas  no deficits     Community and Social Life  no deficits     Participation Restrictions:   None       stable and uncomplicated    Low            GOALS: Pt is in agreement with the following goals.     Short term goals:  6 weeks or 10 visits   1.  Pt will demonstrate increased lumbar ROM by at least 3 degrees from the initial ROM value with improvements noted in functional ROM and ability to perform ADLs  2.  Pt will demonstrate increased maximum isometric torque value by 10% when compared to the initial value resulting in improved ability to perform bending, lifting, and carrying activities safely, confidently.     3.  Patient report a reduction in worst pain score by 1-2 points for improved  tolerance during work and recreational activities  4.  Pt able to perform HEP correctly with minimal cueing or supervision for therapist        Long term goals: 13 weeks or 20 visits   1. Pt will demonstrate increased lumbar ROM by at least 6 degrees from initial ROM value, resulting in improved ability to perform functional fwd bending while standing and sitting.   2. Pt will demonstrate increased maximum isometric torque value by 15-20% when compared to the initial value resulting in improved ability to perform bending, lifting, and carrying activities safely, confidently.  3. Pt to demonstrate ability to independently control and reduce their pain through posture positioning and mechanical movements throughout a typical day.  4.  Patient will demonstrate improved overall function per FOTO Survey to CI = at least 1% but < 20% impaired, limited or restricted score or less.         Plan   Continue with established Plan of Care towards established PT goals.

## 2018-05-09 ENCOUNTER — CLINICAL SUPPORT (OUTPATIENT)
Dept: REHABILITATION | Facility: OTHER | Age: 83
End: 2018-05-09
Attending: PHYSICIAN ASSISTANT
Payer: MEDICARE

## 2018-05-09 DIAGNOSIS — M54.50 ACUTE LEFT-SIDED LOW BACK PAIN WITHOUT SCIATICA: Primary | ICD-10-CM

## 2018-05-09 PROCEDURE — 97110 THERAPEUTIC EXERCISES: CPT

## 2018-05-09 NOTE — PROGRESS NOTES
[facility]      Name: Lavell Coyle  Clinic Number: [id number]  Date of Treatment: 2018   Diagnosis:   Encounter Diagnosis   Name Primary?    Acute left-sided low back pain without sciatica Yes     Physician: Adrianne Hernandez PA-C    Pain pattern determined:Pattern 2  Plan of care signed: 2018   Time in: 10:37  Time Out: 11:15  Total Treatment time: 30  Precautions: Tortuous Aorta, memory loss  Visit #: 7 (Discharge at visit 10)    POC due: 2018    Reassessment done:18  Reassessment done:18    Face to Face discussion of patient was done between PT and PTA.     Subjective   Lavell reports no LBP at this time, denies pain lately. Discussed discharge at visit 10, pt states that he is willing to discharge, he feels like he is doing better    Patient reports their pain to be 0/10 on a 0-10 scale with 0 being no pain and 10 being the worst pain imaginable.    Pain Location: L LB     Work and leisure: Retired Dentist ()   Leisure: Running, weight training                     Pts goals:  To not get worse      Objective     Date of testin2018     ROM 0-54 deg   Max Peak Torque 163    Min Peak Torque 52    Flex/Ext Ratio 3.13:1   % below normative data 30   Counter weight 109   femur 5   Seat pad 0            FOTO: Focus on Therapeutic Outcomes   Category: lumbar   % Impaired: **Will Do Second Visit**  Current Score  = CI = at least 1% but < 20% impaired, limited or restricted  Goal at Discharge Score = CI = at least 1% but < 20% impaired, limited or restricted      Treatment    Pt was instructed in and performed the following:     Lavell received therapeutic exercises to develop/improved posture, cardiovascular endurance, muscular endurance, lumbar/cervical ROM, strength and muscular endurance for 50 minutes including the following exercises:     [facility] 2018   Visit Number 7   VAS Pain Rating 0   Treadmill Time (in min.) 10   Speed 2    Flexion in Lying 10   Lumbar Extension Seat Pad -   Femur Restraint -   Top Dead Center -   Counterweight -   Lumbar Flexion -   Lumbar Extension -   Lumbar Peak Torque -   Min Torque -   Percent From Norm -   Lumbar Weight 60   Repetitions 17   Rating of Perceived Exertion 3   Ice - Z Lie (in min.) -         LTR 10x  PPT 10x  OB 10x  Bridge 10x  Clam shells 10x  Prone hip extension 10x    Peripheral muscle strengthening which included 1 set of 15-20 repetitions at a slow, controlled 7 second per rep pace focused on strengthening supporting musculature for improved body mechanics and functional mobility.  Pt and therapist focused on proper form during treatment to ensure optimal strengthening of each targeted muscle group.  Machines were utilized including torso rotation, leg extension, leg curl, chest press, upright row. Tricep extension, bicep curl, leg press, and hip abduction added on third visit.       Lavell received the following manual therapy techniques: n/a were applied to the: n/a for 0 minutes.       Home Exercise Program as follows:   SHANNA 10x   Clam shells 10x  Prone hip extension 10x  LTR 10x  PPT 10x  Bridge 10x  Open Book 10x       Pt was given  full HEP printout 4/25/2018, 5/7/18  Handouts were given to the patient. Pt demo good understanding of the education provided. Lavell demonstrated good return demonstration of activities.     Lumbar roll use compliance: not yet, getting soon  Additional exercises taught this treatment session: none  (Pt did this stretch before in other therapy and he remembers it.)  Assessment     Pt with no LBP pre,during and post session. Pt able to perform HEP with moderate v/c for technique and recall. Pt states that he has been doing the exercises on his paper but there are still some that he forgets. Not planning on adding any exercises, plan on d/c at visit 10 after test. Does not like to do cryo.     Patient is making good progress towards established goals.  Pt will  continue to benefit from skilled outpatient physical therapy to address the deficits stated in the impairment chart, provide pt/family education and to maximize pt's level of independence in the home and community environment.       Pt's spiritual, cultural and educational needs considered and pt agreeable to plan of care and goals as stated below:     Medical necessity is demonstrated by the following IMPAIRMENTS/PROBLEMS:    History  Co-morbidities and personal factors that may impact the plan of care Examination  Body Structures and Functions, activity limitations and participation restrictions that may impact the plan of care Clinical Presentation    Decision Making/ Complexity Score   Co-morbidities:   advanced age           Personal Factors:   age Body Regions:   back     Body Systems:   gross symmetry  gross coordinated movement  balance  transfers  motor control  motor learning     Activity limitations:   Learning and applying knowledge  no deficits     General Tasks and Commands  no deficits     Communication  no deficits     Mobility  no deficits     Self care  no deficits     Domestic Life  no deficits     Interactions/Relationships  no deficits     Life Areas  no deficits     Community and Social Life  no deficits     Participation Restrictions:   None       stable and uncomplicated    Low            GOALS: Pt is in agreement with the following goals.     Short term goals:  6 weeks or 10 visits   1.  Pt will demonstrate increased lumbar ROM by at least 3 degrees from the initial ROM value with improvements noted in functional ROM and ability to perform ADLs  2.  Pt will demonstrate increased maximum isometric torque value by 10% when compared to the initial value resulting in improved ability to perform bending, lifting, and carrying activities safely, confidently.     3.  Patient report a reduction in worst pain score by 1-2 points for improved tolerance during work and recreational activities  4.  Pt  able to perform HEP correctly with minimal cueing or supervision for therapist        Long term goals: 13 weeks or 20 visits   1. Pt will demonstrate increased lumbar ROM by at least 6 degrees from initial ROM value, resulting in improved ability to perform functional fwd bending while standing and sitting.   2. Pt will demonstrate increased maximum isometric torque value by 15-20% when compared to the initial value resulting in improved ability to perform bending, lifting, and carrying activities safely, confidently.  3. Pt to demonstrate ability to independently control and reduce their pain through posture positioning and mechanical movements throughout a typical day.  4.  Patient will demonstrate improved overall function per FOTO Survey to CI = at least 1% but < 20% impaired, limited or restricted score or less.         Plan   Continue with established Plan of Care towards established PT goals.

## 2018-05-15 ENCOUNTER — PATIENT MESSAGE (OUTPATIENT)
Dept: INTERNAL MEDICINE | Facility: CLINIC | Age: 83
End: 2018-05-15

## 2018-05-15 ENCOUNTER — TELEPHONE (OUTPATIENT)
Dept: INTERNAL MEDICINE | Facility: CLINIC | Age: 83
End: 2018-05-15

## 2018-05-15 NOTE — TELEPHONE ENCOUNTER
----- Message from Alberto Hanson sent at 5/14/2018  4:14 PM CDT -----  Contact: Patient [phone]  The patient is requesting a referral to a different urologist than the last one.    Thank you

## 2018-05-15 NOTE — TELEPHONE ENCOUNTER
Most of them specialize, he has been seeing Dr. Sterling is there a particular problem he would like addressed. L

## 2018-05-17 ENCOUNTER — TELEPHONE (OUTPATIENT)
Dept: INTERNAL MEDICINE | Facility: CLINIC | Age: 83
End: 2018-05-17

## 2018-05-17 NOTE — TELEPHONE ENCOUNTER
----- Message from Alberto Hanson sent at 5/17/2018 11:18 AM CDT -----  Contact: Patient [phone]  Patient would like to get medical advice.    Symptoms (please be specific):  All of his joints are stiff, especially the knees, but, no pain.    How long has patient had these symptoms:  2 weeks    Pharmacy name and phone #:  [facility] [id number] [address] AT [facility] [phone] (Phone)  [phone] (Fax)    Comments: He wants to know if he should see you, or be referred to an orthopedic specialist.

## 2018-05-18 ENCOUNTER — TELEPHONE (OUTPATIENT)
Dept: INTERNAL MEDICINE | Facility: CLINIC | Age: 83
End: 2018-05-18

## 2018-05-18 NOTE — TELEPHONE ENCOUNTER
----- Message from Mayo Ventura sent at 5/18/2018  9:59 AM CDT -----  Contact: self [phone]  Patient  requesting a call from the office in regards to his knees , stated he need a referral . Please call and advise, Thanks

## 2018-05-21 ENCOUNTER — OFFICE VISIT (OUTPATIENT)
Dept: INTERNAL MEDICINE | Facility: CLINIC | Age: 83
End: 2018-05-21
Payer: MEDICARE

## 2018-05-21 ENCOUNTER — TELEPHONE (OUTPATIENT)
Dept: INTERNAL MEDICINE | Facility: CLINIC | Age: 83
End: 2018-05-21

## 2018-05-21 ENCOUNTER — HOSPITAL ENCOUNTER (OUTPATIENT)
Dept: RADIOLOGY | Facility: HOSPITAL | Age: 83
Discharge: HOME | End: 2018-05-21
Attending: INTERNAL MEDICINE
Payer: MEDICARE

## 2018-05-21 VITALS
OXYGEN SATURATION: 97 % | WEIGHT: 154.31 LBS | HEART RATE: 66 BPM | SYSTOLIC BLOOD PRESSURE: 108 MMHG | HEIGHT: 68 IN | BODY MASS INDEX: 23.39 KG/M2 | DIASTOLIC BLOOD PRESSURE: 78 MMHG

## 2018-05-21 DIAGNOSIS — M25.562 ACUTE PAIN OF LEFT KNEE: Primary | ICD-10-CM

## 2018-05-21 DIAGNOSIS — M25.562 ACUTE PAIN OF LEFT KNEE: ICD-10-CM

## 2018-05-21 PROCEDURE — 99214 OFFICE O/P EST MOD 30 MIN: CPT | Mod: S$GLB,,, | Performed by: INTERNAL MEDICINE

## 2018-05-21 PROCEDURE — 99499 UNLISTED E&M SERVICE: CPT | Mod: S$GLB,,, | Performed by: INTERNAL MEDICINE

## 2018-05-21 PROCEDURE — 73562 X-RAY EXAM OF KNEE 3: CPT | Mod: 26,LT,, | Performed by: RADIOLOGY

## 2018-05-21 PROCEDURE — 99999 PR PBB SHADOW E&M-EST. PATIENT-LVL III: CPT | Mod: PBBFAC,,, | Performed by: INTERNAL MEDICINE

## 2018-05-21 PROCEDURE — 73562 X-RAY EXAM OF KNEE 3: CPT | Mod: TC,LT

## 2018-05-21 RX ORDER — NAPROXEN 500 MG/1
500 TABLET ORAL 2 TIMES DAILY WITH MEALS
Qty: 14 TABLET | Refills: 0 | Status: SHIPPED | OUTPATIENT
Start: 2018-05-21 | End: 2018-05-25 | Stop reason: SDUPTHER

## 2018-05-21 NOTE — TELEPHONE ENCOUNTER
----- Message from Viktor Parker sent at 5/21/2018  7:56 AM CDT -----  Contact: self  Pt called in about wanting to see if can get appt for his right knee. Pt having pain in it. Pt would like the nurse to give him a call back in regards to this matter      Pt can be reached at [phone]        TY

## 2018-05-21 NOTE — PROGRESS NOTES
INTERNAL MEDICINE SAME DAY PRIMARY CARE VISIT NOTE    Subjective:     Chief Complaint: Knee Pain (left knee pain for 1 week)       Patient ID: Lavell Coyle is a 86 y.o. male with hypothyroidism, BPH, osteopenia, hx lumbar compression fx, hx R knee surgery c medial/lateral meniscectomy, mild cognitive impairement, here today for focused same-day primary care visit.    Today, patient with complaint of L knee pain x one week.  Usually jogs slowly about 1.8 miles/day but says his knee has been bothering him.  No recent injuries/falls.      Denies swelling.  Has not tried taking any meds for it.  Worse c jogging, going from sitting to standing, getting in and out of the car, etc.    Past Medical History:  Past Medical History:   Diagnosis Date    Allergic blepharitis 5/20/2014    Basal cell cancer     History of mononucleosis     pupura    History of osteopenia     Hypothyroidism     Melanoma     Osteopenia     Prostate hypertrophy     Thrombocytopenia 2002    Thrombocytopenia 2002    Thyroid disease     Thyroid nodule 07/2009    left nodule       Home Medications:  Prior to Admission medications    Medication Sig Start Date End Date Taking? Authorizing Provider   calcium citrate-vitamin D3 315-200 mg (CITRACAL+D) 315-200 mg-unit per tablet Take 1 tablet by mouth once daily.   Yes Historical Provider, MD   IBUPROFEN/DIPHENHYDRAMINE CIT (ADVIL PM ORAL) Take by mouth.   Yes Historical Provider, MD   levothyroxine (SYNTHROID) 50 MCG tablet TAKE 1 TABLET BY MOUTH EVERY DAY 1/8/18  Yes Rosalva Howard MD   MULTIVITAMIN ORAL Take by mouth.   Yes Historical Provider, MD   OMEGA-3/DHA/EPA/FISH OIL (OMEGA-3 ORAL) Take by mouth.   Yes Historical Provider, MD   saw palmetto 160 MG capsule Take 160 mg by mouth 2 (two) times daily.     Yes Historical Provider, MD       Allergies:  Review of patient's allergies indicates:  No Known Allergies    Social History:  Social History   Substance Use Topics    Smoking status:  "Never Smoker    Smokeless tobacco: Never Used    Alcohol use 0.6 oz/week     1 Glasses of wine per week      Comment: ONCE DAILY         Review of Systems   Constitutional: Negative for chills, fatigue and fever.   Respiratory: Negative for cough, chest tightness and shortness of breath.    Cardiovascular: Negative for chest pain.   Gastrointestinal: Negative for abdominal pain and blood in stool.   Genitourinary: Negative for dysuria and frequency.   Musculoskeletal: Positive for arthralgias.           Objective:   /78 (BP Location: Left arm, Patient Position: Sitting, BP Method: Medium (Manual))   Pulse 66   Ht 5' 8" (1.727 m)   Wt 70 kg (154 lb 5.2 oz)   SpO2 97%   BMI 23.46 kg/m²        General: AAO x3, no apparent distress  CV: RRR, no m/r/g  Pulm: Lungs CTAB, no crackles, no wheezes  Abd: s/NT/ND +BS  Extremities: no c/c/e, no crepitus on knee exam, ROM wnl      Labs:     CMP  Sodium   Date Value Ref Range Status   02/19/2018 137 136 - 145 mmol/L Final     Potassium   Date Value Ref Range Status   02/19/2018 5.1 3.5 - 5.1 mmol/L Final     Chloride   Date Value Ref Range Status   02/19/2018 105 95 - 110 mmol/L Final     CO2   Date Value Ref Range Status   02/19/2018 28 23 - 29 mmol/L Final     Glucose   Date Value Ref Range Status   02/19/2018 93 70 - 110 mg/dL Final     BUN, Bld   Date Value Ref Range Status   02/19/2018 26 (H) 8 - 23 mg/dL Final     Creatinine   Date Value Ref Range Status   02/19/2018 0.9 0.5 - 1.4 mg/dL Final     Calcium   Date Value Ref Range Status   02/19/2018 8.9 8.7 - 10.5 mg/dL Final     Total Protein   Date Value Ref Range Status   02/19/2018 5.8 (L) 6.0 - 8.4 g/dL Final     Albumin   Date Value Ref Range Status   02/19/2018 3.4 (L) 3.5 - 5.2 g/dL Final     Total Bilirubin   Date Value Ref Range Status   02/19/2018 0.4 0.1 - 1.0 mg/dL Final     Comment:     For infants and newborns, interpretation of results should be based  on gestational age, weight and in agreement " with clinical  observations.  Premature Infant recommended reference ranges:  Up to 24 hours.............<8.0 mg/dL  Up to 48 hours............<12.0 mg/dL  3-5 days..................<15.0 mg/dL  6-29 days.................<15.0 mg/dL       Alkaline Phosphatase   Date Value Ref Range Status   02/19/2018 58 55 - 135 U/L Final     AST   Date Value Ref Range Status   02/19/2018 22 10 - 40 U/L Final     ALT   Date Value Ref Range Status   02/19/2018 16 10 - 44 U/L Final     Anion Gap   Date Value Ref Range Status   02/19/2018 4 (L) 8 - 16 mmol/L Final     eGFR if    Date Value Ref Range Status   02/19/2018 >60 >60 mL/min/1.73 m^2 Final     eGFR if non    Date Value Ref Range Status   02/19/2018 >60 >60 mL/min/1.73 m^2 Final     Comment:     Calculation used to obtain the estimated glomerular filtration  rate (eGFR) is the CKD-EPI equation.            Assessment/Plan     Lavell was seen today for knee pain.    Diagnoses and all orders for this visit:    Acute pain of left knee  As per HPI, no crepitus on exam.  Mild degenerative changes on old xray from 2014, will repeat today and tx c naproxen, advised not to take c his advil pm.  -     X-Ray Knee 3 View Left; Future  -     Ambulatory Referral to Orthopedics  -     naproxen (NAPROSYN) 500 MG tablet; Take 1 tablet (500 mg total) by mouth 2 (two) times daily with meals.    RTC prn and with PCP as per routine.    Lynette Mireles MD  Department of Internal Medicine - [facility]  05/21/2018

## 2018-05-25 DIAGNOSIS — M25.562 ACUTE PAIN OF LEFT KNEE: ICD-10-CM

## 2018-05-25 RX ORDER — NAPROXEN 500 MG/1
TABLET ORAL
Qty: 30 TABLET | Refills: 0 | Status: SHIPPED | OUTPATIENT
Start: 2018-05-25 | End: 2018-06-08 | Stop reason: SDUPTHER

## 2018-06-08 DIAGNOSIS — M25.562 ACUTE PAIN OF LEFT KNEE: ICD-10-CM

## 2018-06-11 RX ORDER — NAPROXEN 500 MG/1
TABLET ORAL
Qty: 30 TABLET | Refills: 0 | Status: SHIPPED | OUTPATIENT
Start: 2018-06-11 | End: 2018-06-14

## 2018-06-14 ENCOUNTER — OFFICE VISIT (OUTPATIENT)
Dept: ORTHOPEDICS | Facility: CLINIC | Age: 83
End: 2018-06-14
Payer: MEDICARE

## 2018-06-14 ENCOUNTER — HOSPITAL ENCOUNTER (OUTPATIENT)
Dept: RADIOLOGY | Facility: HOSPITAL | Age: 83
Discharge: HOME | End: 2018-06-14
Attending: PHYSICIAN ASSISTANT
Payer: MEDICARE

## 2018-06-14 VITALS
HEART RATE: 65 BPM | SYSTOLIC BLOOD PRESSURE: 115 MMHG | WEIGHT: 152.31 LBS | HEIGHT: 68 IN | DIASTOLIC BLOOD PRESSURE: 76 MMHG | BODY MASS INDEX: 23.08 KG/M2

## 2018-06-14 DIAGNOSIS — M25.569 ACUTE KNEE PAIN, UNSPECIFIED LATERALITY: Primary | ICD-10-CM

## 2018-06-14 DIAGNOSIS — M17.0 PRIMARY OSTEOARTHRITIS OF BOTH KNEES: ICD-10-CM

## 2018-06-14 DIAGNOSIS — M25.569 ACUTE KNEE PAIN, UNSPECIFIED LATERALITY: ICD-10-CM

## 2018-06-14 PROCEDURE — 99999 PR PBB SHADOW E&M-EST. PATIENT-LVL III: CPT | Mod: PBBFAC,,, | Performed by: PHYSICIAN ASSISTANT

## 2018-06-14 PROCEDURE — 99214 OFFICE O/P EST MOD 30 MIN: CPT | Mod: S$GLB,,, | Performed by: PHYSICIAN ASSISTANT

## 2018-06-14 PROCEDURE — 73565 X-RAY EXAM OF KNEES: CPT | Mod: 26,,, | Performed by: RADIOLOGY

## 2018-06-14 PROCEDURE — 73565 X-RAY EXAM OF KNEES: CPT | Mod: TC

## 2018-06-14 PROCEDURE — 99499 UNLISTED E&M SERVICE: CPT | Mod: S$GLB,,, | Performed by: PHYSICIAN ASSISTANT

## 2018-06-14 RX ORDER — MELOXICAM 15 MG/1
15 TABLET ORAL DAILY
Qty: 30 TABLET | Refills: 1 | Status: SHIPPED | OUTPATIENT
Start: 2018-06-14 | End: 2018-07-02

## 2018-06-14 NOTE — PROGRESS NOTES
SUBJECTIVE:     Chief Complaint & History of Present Illness:  Lavell Coyle is a New patient 86 y.o. male who is seen here today with a complaint of    Chief Complaint   Patient presents with    Left Knee - Pain    .  He is here today for evaluation treatment of pain and soreness in the left knee associated with activities.  Patient is very active and does the gym on a regular basis and exercises daily.  He's developed some soreness and pain in the left knee associated with the his exercise routine periods of prolonged ambulations.  He did have some minor swelling which has resolved with little bit of rest.  He is a stopped his a weight training routine at this point until he can  determine what is going on with his knee.  Was prescribed naproxen sodium 500 mg twice a day by his primary care but has not taken  On a scale of 1-10, with 10 being worst pain imaginable, he rates this pain as 4 on good days and 6 on bad days.  he describes the pain as sore and aching.    Review of patient's allergies indicates:  No Known Allergies      Current Outpatient Prescriptions   Medication Sig Dispense Refill    calcium citrate-vitamin D3 315-200 mg (CITRACAL+D) 315-200 mg-unit per tablet Take 1 tablet by mouth once daily.      levothyroxine (SYNTHROID) 50 MCG tablet TAKE 1 TABLET BY MOUTH EVERY DAY 30 tablet 12    MULTIVITAMIN ORAL Take by mouth.      OMEGA-3/DHA/EPA/FISH OIL (OMEGA-3 ORAL) Take by mouth.      saw palmetto 160 MG capsule Take 160 mg by mouth 2 (two) times daily.        meloxicam (MOBIC) 15 MG tablet Take 1 tablet (15 mg total) by mouth once daily. 30 tablet 1     No current facility-administered medications for this visit.        Past Medical History:   Diagnosis Date    Allergic blepharitis 5/20/2014    Basal cell cancer     History of mononucleosis     pupura    History of osteopenia     Hypothyroidism     Melanoma     Osteopenia     Prostate hypertrophy     Thrombocytopenia 2002     "Thrombocytopenia 2002    Thyroid disease     Thyroid nodule 07/2009    left nodule       Past Surgical History:   Procedure Laterality Date    CARPAL TUNNEL RELEASE      left    KNEE ARTHROSCOPY      KNEE SURGERY      Moh      surgery    ROTATOR CUFF REPAIR      right    SKIN CANCER EXCISION         Vital Signs (Most Recent)  Vitals:    06/14/18 1437   BP: 115/76   Pulse: 65           Review of Systems:  ROS:  Constitutional: no fever or chills  Eyes: no visual changes  ENT: no nasal congestion or sore throat  Respiratory: no cough or shortness of breath  Cardiovascular: no chest pain or palpitations  Gastrointestinal: no nausea or vomiting, tolerating diet  Genitourinary: no hematuria or dysuria  Integument/Breast: no rash or pruritis  Hematologic/Lymphatic: no easy bruising or lymphadenopathy  Musculoskeletal: no arthralgias or myalgias  Neurological: no seizures or tremors, Mild cognitive impairment  Behavioral/Psych: no auditory or visual hallucinations  Endocrine: no heat or cold intolerance, Hypothyroidism                OBJECTIVE:     PHYSICAL EXAM:  Height: 5' 8" (172.7 cm) Weight: 69.1 kg (152 lb 5.4 oz), General Appearance: Well nourished, well developed, in no acute distress.  Neurological: Mood & affect are normal.  left  Knee Exam:  Knee Range of Motion:0-130 degrees flexion   Effusion:none  Condition of skin:intact  Location of tenderness:Medial joint line   Strength:5 of 5  Stability:  Lachman: stable, LCL: stable, MCL: stable, PCL: stable and posteromedial (dial): stable  Varus /Valgus stress:  normal  Steff:   negative/negative    right  Knee Exam:  Knee Range of Motion:0-130 degrees flexion   Effusion:none  Condition of skin:intact  Location of tenderness:None   Strength:5 of 5  Stability:  Lachman: stable, LCL: stable, MCL: stable, PCL: stable and posteromedial (dial): stable  Varus /Valgus stress:  normal  Steff:   negative/negative      Hip " Examination:  normal    RADIOGRAPHS:  X-rays taken today films reviewed by me demonstrate very mild medial joint space narrowing bilateral knees without evidence of sclerotic change osteophytic spurring fracture dislocation or other bony abnormalities    ASSESSMENT/PLAN:     Plan: We discussed with the patient at length all the different treatment options available for  the knee including anti-inflammatories, acetaminophen, rest, ice, knee strengthening exercise, occasional cortisone injections for temporary relief, Viscosupplimentation injections, arthroscopic debridement osteotomy, and finally knee arthroplasty.   Patient like to begin with conservative therapy  Meloxicam 15 mg daily with food ×7 days followed by.  Rest and ice to affected area for swelling  Decrease weight amounts in his exercise routine to increase repetitions avoid impact exercises  Follow-up if symptoms not resolve

## 2018-06-20 ENCOUNTER — LAB VISIT (OUTPATIENT)
Dept: LAB | Facility: HOSPITAL | Age: 83
End: 2018-06-20
Attending: INTERNAL MEDICINE
Payer: MEDICARE

## 2018-06-20 ENCOUNTER — OFFICE VISIT (OUTPATIENT)
Dept: ENDOCRINOLOGY | Facility: CLINIC | Age: 83
End: 2018-06-20
Payer: MEDICARE

## 2018-06-20 VITALS
HEART RATE: 68 BPM | HEIGHT: 68 IN | WEIGHT: 157.44 LBS | SYSTOLIC BLOOD PRESSURE: 100 MMHG | BODY MASS INDEX: 23.86 KG/M2 | DIASTOLIC BLOOD PRESSURE: 64 MMHG

## 2018-06-20 DIAGNOSIS — M80.00XD AGE-RELATED OSTEOPOROSIS WITH CURRENT PATHOLOGICAL FRACTURE WITH ROUTINE HEALING: Primary | ICD-10-CM

## 2018-06-20 DIAGNOSIS — S32.010D CLOSED COMPRESSION FRACTURE OF L1 LUMBAR VERTEBRA WITH ROUTINE HEALING, SUBSEQUENT ENCOUNTER: ICD-10-CM

## 2018-06-20 DIAGNOSIS — M84.68XA PATHOLOGICAL FRACTURE IN OTHER DISEASE, OTHER SITE, INITIAL ENCOUNTER FOR FRACTURE: ICD-10-CM

## 2018-06-20 DIAGNOSIS — M80.00XD AGE-RELATED OSTEOPOROSIS WITH CURRENT PATHOLOGICAL FRACTURE WITH ROUTINE HEALING: ICD-10-CM

## 2018-06-20 LAB — 25(OH)D3+25(OH)D2 SERPL-MCNC: 24 NG/ML

## 2018-06-20 PROCEDURE — 36415 COLL VENOUS BLD VENIPUNCTURE: CPT

## 2018-06-20 PROCEDURE — 82306 VITAMIN D 25 HYDROXY: CPT

## 2018-06-20 PROCEDURE — 99204 OFFICE O/P NEW MOD 45 MIN: CPT | Mod: S$GLB,,, | Performed by: INTERNAL MEDICINE

## 2018-06-20 PROCEDURE — 99999 PR PBB SHADOW E&M-EST. PATIENT-LVL III: CPT | Mod: PBBFAC,,, | Performed by: INTERNAL MEDICINE

## 2018-06-20 NOTE — PROGRESS NOTES
Subjective:     Patient ID: Lavell Coyle is a 86 y.o. male.    Chief Complaint: No chief complaint on file.    HPI:   Mr. Coyle is a 86 y.o. male who is here for a consult visit for evaluation  and management of osteoporosis with compression fracture of L1 vertebrae and distal left distal arm. Diagnosed with bone density done ten years ago. Used alendronate for several years, stopped one year ago.     Osteoporosis Risk Factor Assessment:    Denies past history of chronic illness, restrictive dieting as a child/adolescent or young adult.     Fracture of distal arm that occurred after a fall while running. Denies other history of falls or fractures. Denies loss of height.     Denies a family history of osteoporosis or family history of stooped posture, or fractures of hip.  Denied steroid exposure, however was listed in his MAR -- medrol dose pack 11/2017.  Denies use of hormone replacement therapy,  anticoagulants, proton pump inhibitors, diabetes medications or antiseizure medications.     She denies history of calcium disorders, thyroid disease, kidney stones, malabsorption symptoms, anemia or kidney disease.     Supplements:  Calcium citrate + Vitamin D3 one tablet daily  MVI    Dietary: walks/gym      Review of Systems   Constitutional: Negative for chills and fever.   HENT: Negative for congestion and sinus pressure.    Eyes: Negative for visual disturbance.   Respiratory: Negative for chest tightness and shortness of breath.    Cardiovascular: Negative for chest pain and palpitations.   Gastrointestinal: Negative for abdominal distention, diarrhea, nausea and vomiting.   Genitourinary: Negative for dysuria and flank pain.   Musculoskeletal: Negative for back pain.   Skin: Negative for rash.   Neurological: Negative for weakness.   Hematological: Does not bruise/bleed easily.   Psychiatric/Behavioral: Negative for sleep disturbance.        Objective:     Physical Exam   Constitutional: He is oriented to  "person, place, and time. He appears well-developed and well-nourished. No distress.   HENT:   Head: Normocephalic and atraumatic.   Nose: Nose normal.   Mouth/Throat: Oropharynx is clear and moist. No oropharyngeal exudate.   Eyes: Conjunctivae and EOM are normal. Pupils are equal, round, and reactive to light. No scleral icterus.   Neck: Normal range of motion. Neck supple. No tracheal deviation present. No thyromegaly present.   Cardiovascular: Normal rate, regular rhythm, normal heart sounds and intact distal pulses.    Pulmonary/Chest: Effort normal and breath sounds normal.   Abdominal: Soft. Bowel sounds are normal. He exhibits no distension. There is no tenderness.   Musculoskeletal: Normal range of motion. He exhibits no edema or tenderness.   Lymphadenopathy:     He has no cervical adenopathy.   Neurological: He is alert and oriented to person, place, and time. He has normal reflexes.   Skin: Skin is warm and dry.   Psychiatric: He has a normal mood and affect.     Vitals:    06/20/18 1002   BP: 100/64   BP Location: Right arm   Patient Position: Sitting   BP Method: Large (Manual)   Pulse: 68   Weight: 71.4 kg (157 lb 6.5 oz)   Height: 5' 8" (1.727 m)     Results for LILLY COYLE (MRN [id number]) as of 6/20/2018 10:35   Ref. Range 6/15/2016 09:49   Vit D, 25-Hydroxy Latest Ref Range: 30 - 96 ng/mL 26 (L)   Results for LILLY COYLE (MRN [id number]) as of 6/20/2018 10:35   Ref. Range 2/19/2018 13:57   TSH Latest Ref Range: 0.400 - 4.000 uIU/mL 2.267     6/2016 BONE MINERAL DENSITY RESULTS:  Lumbar Spine: Lumbar bone mineral density L1-L4 is 0.907g/cm2, which is a t-score of -1.7. The z-score is -0.4.    Total Hip: The total hip bone mineral density is 0.785g/cm2.  The t-score is -1.6, and the z-score is -0.4.  Femoral neck BMD is 0.588g/cm2 and the t-score is -2.5.    MRI 3/2018  Remote mild L1 compression fracture    Assessment/Plan:     Mr. Coyle is an 86 year old gentleman with short term memory loss who " is here for evaluation of osteoporosis with compression fracture of L1 spine.   Discussed rationale behind treatment including high risk for future fractures, specifically hip fracture.     Discussed prolia which he did not remember discussing with Dr. Howard. Reviewed benefits, risks and side effects.   Needs vitamin D levels, if >30 - 33 would recommend treatment twice yearly with prolia.     1. Age-related osteoporosis with current pathological fracture with routine healing    - DXA Bone Density Spine And Hip; Future  - Vitamin D; Future    2. Closed compression fracture of L1 lumbar vertebra with routine healing, subsequent encounter    - DXA Bone Density Spine And Hip; Future  - Vitamin D; Future    3. Pathological fracture in other disease, other site, initial encounter for fracture     - DXA Bone Density Spine And Hip; Future

## 2018-06-20 NOTE — PATIENT INSTRUCTIONS
Prolia - we discussed side effects, benefits and risks. This is the medication for your bones that helps decrease risk for fractures.     Plan to repeat bone density and check your vitamin D levels.

## 2018-06-21 ENCOUNTER — OFFICE VISIT (OUTPATIENT)
Dept: ORTHOPEDICS | Facility: CLINIC | Age: 83
End: 2018-06-21
Payer: MEDICARE

## 2018-06-21 VITALS
HEIGHT: 68 IN | DIASTOLIC BLOOD PRESSURE: 82 MMHG | SYSTOLIC BLOOD PRESSURE: 142 MMHG | WEIGHT: 157 LBS | BODY MASS INDEX: 23.79 KG/M2

## 2018-06-21 DIAGNOSIS — M17.12 PRIMARY OSTEOARTHRITIS OF LEFT KNEE: Primary | ICD-10-CM

## 2018-06-21 PROCEDURE — 99213 OFFICE O/P EST LOW 20 MIN: CPT | Mod: S$GLB,,, | Performed by: ORTHOPAEDIC SURGERY

## 2018-06-21 PROCEDURE — 99999 PR PBB SHADOW E&M-EST. PATIENT-LVL III: CPT | Mod: PBBFAC,,, | Performed by: ORTHOPAEDIC SURGERY

## 2018-06-21 NOTE — PROGRESS NOTES
Subjective:      Patient ID: Lavell Coyle is a 86 y.o. male.    Chief Complaint: Pain of the Left Knee    HPI     They have experienced problems with their left knee over the past 2 months. The patient denies relevant history of injury/aggravation. Pain is located medially Associated symptoms include NA.  Symptoms are aggravated by jogging. They have been treated with NA.   Symptoms have recently stayed the same. Ambulation reportedly has not been impaired. Self care ADLs are not painful.     Review of Systems   Constitution: Negative for fever and weight loss.   HENT: Negative for congestion.    Eyes: Negative for visual disturbance.   Cardiovascular: Negative for chest pain.   Respiratory: Negative for shortness of breath.    Hematologic/Lymphatic: Negative for bleeding problem. Does not bruise/bleed easily.   Skin: Negative for poor wound healing.   Musculoskeletal: Positive for joint pain.   Gastrointestinal: Negative for abdominal pain.   Genitourinary: Negative for dysuria.   Neurological: Negative for seizures.   Psychiatric/Behavioral: Negative for altered mental status.   Allergic/Immunologic: Negative for persistent infections.         Objective:            Ortho/SPM Exam        Vitals:    06/21/18 1414   BP: (!) 142/82         Left Knee      Vitals:    06/21/18 1414   BP: (!) 142/82       The patient is not in acute distress.   Body habitus is normal.   The patient walks without a limp.  Resisted SLR negative.   The skin over the knee is intact.  Knee effusion 0  Tendernes is located absent  Range of motion- Flexion 140 deg, Extension 0 deg,   Ligament exam:   MCL intact   Lachman intact              Post sag intact    LCL intact  Patellar apprehension negative.  Popliteal cyst negative  Patellar crepitation absent.  Flexion/pinch negative.  Pulses DP present, PT present.  Motor normal 5/5 strength in all tested muscle groups.   Sensory normal.      I reviewed the relevant radiographic images for the  patient's condition:  Recent films show mild medial compartment narrowing          Assessment:       No diagnosis found.       Structurally speaking, the condition is mild.  It may be aggravated by exercise.  Plan:       There are no diagnoses linked to this encounter.    I explained my diagnostic impression and the reasoning behind it in detail, using layman's terms.  Models and/or pictures were used to help in the explanation.    Exercise modification explained    Ice after exercise    Over-the-counter analgesics    Brace during exercise    Consider injection if symptoms persist

## 2018-06-22 ENCOUNTER — TELEPHONE (OUTPATIENT)
Dept: ENDOCRINOLOGY | Facility: CLINIC | Age: 83
End: 2018-06-22

## 2018-06-22 NOTE — TELEPHONE ENCOUNTER
Vitamin D is low  Add over the counter vitamin D 1000 IUS daily   OK to proceed with prolia  Discussed benefits/risks and side effects at appointment visit.   Will loop in Dr. Howard given his memory difficulties.     SD

## 2018-07-02 ENCOUNTER — OFFICE VISIT (OUTPATIENT)
Dept: INTERNAL MEDICINE | Facility: CLINIC | Age: 83
End: 2018-07-02
Payer: MEDICARE

## 2018-07-02 VITALS
BODY MASS INDEX: 23.56 KG/M2 | HEIGHT: 68 IN | DIASTOLIC BLOOD PRESSURE: 62 MMHG | SYSTOLIC BLOOD PRESSURE: 118 MMHG | HEART RATE: 61 BPM | OXYGEN SATURATION: 96 % | WEIGHT: 155.44 LBS

## 2018-07-02 DIAGNOSIS — Z00.00 PHYSICAL EXAM: Primary | ICD-10-CM

## 2018-07-02 DIAGNOSIS — S32.010D CLOSED COMPRESSION FRACTURE OF L1 LUMBAR VERTEBRA WITH ROUTINE HEALING, SUBSEQUENT ENCOUNTER: ICD-10-CM

## 2018-07-02 PROCEDURE — 99999 PR PBB SHADOW E&M-EST. PATIENT-LVL III: CPT | Mod: PBBFAC,,, | Performed by: INTERNAL MEDICINE

## 2018-07-02 PROCEDURE — 99397 PER PM REEVAL EST PAT 65+ YR: CPT | Mod: S$GLB,,, | Performed by: INTERNAL MEDICINE

## 2018-07-02 NOTE — PROGRESS NOTES
Subjective:      Patient ID: Lavell Coyle is a 86 y.o. male.    Chief Complaint: Annual Exam    HPI:  HPI   Reviewed: Endocrinology appt    Annual exam: 7/2/2018  Colonoscopy: 7/14/2009: now 85 and no history  PSA 0.55  Optho: Dr. Santillan due soon  Flu:in the fall  Tetanus: 2/12/2013  Shingles:has not done this  Pneumovax: done  Prevnar: done    Consultant:  Radha Wolff    Patient Active Problem List   Diagnosis    BPH with urinary obstruction    Thyroid nodule    Age-related osteoporosis with current pathological fracture with routine healing    Skin cancer    History of melanoma excision    Basal cell cancer    MCI (mild cognitive impairment) with memory loss    Hypothyroidism    Medial meniscus tear    S/P R knee surgery, medial/lateral menisectomy, loose body removal    Range of motion deficit    Decreased strength    Nuclear cataract    Ocular migraine    Blepharitis of both eyes    Bilateral dry eyes    Memory loss    Tortuous aorta    Meningiomas, multiple    Closed compression fracture of first lumbar vertebra    Acute left-sided low back pain without sciatica     Past Medical History:   Diagnosis Date    Allergic blepharitis 5/20/2014    Basal cell cancer     History of mononucleosis     pupura    History of osteopenia     Hypothyroidism     Melanoma     Osteopenia     Prostate hypertrophy     Thrombocytopenia 2002    Thrombocytopenia 2002    Thyroid disease     Thyroid nodule 07/2009    left nodule     Past Surgical History:   Procedure Laterality Date    CARPAL TUNNEL RELEASE      left    KNEE ARTHROSCOPY      KNEE SURGERY      Moh      surgery    ROTATOR CUFF REPAIR      right    SKIN CANCER EXCISION       Family History   Problem Relation Age of Onset    Cancer Other     Alcohol abuse Father     Prostate cancer Neg Hx     Kidney disease Neg Hx      Review of Systems   Constitutional: Negative for appetite change, chills, fever and unexpected weight change.  "  Respiratory: Negative for shortness of breath and wheezing.    Cardiovascular: Negative for chest pain, palpitations and leg swelling.   Gastrointestinal: Negative for abdominal pain, blood in stool, diarrhea, nausea and vomiting.   Neurological:        Discussed memory     Objective:     Vitals:    07/02/18 1414   BP: 118/62   Pulse: 61   SpO2: 96%   Weight: 70.5 kg (155 lb 6.8 oz)   Height: 5' 8" (1.727 m)   PainSc:   4   PainLoc: Knee     Body mass index is 23.63 kg/m².  Physical Exam   Constitutional: He is oriented to person, place, and time. He appears well-developed and well-nourished. No distress.   Neck: Carotid bruit is not present. No thyromegaly present.   Cardiovascular: Normal rate, regular rhythm and normal heart sounds.  PMI is not displaced.    Pulmonary/Chest: Effort normal and breath sounds normal. No respiratory distress.   Abdominal: Soft. Bowel sounds are normal. He exhibits no distension. There is no tenderness.   Musculoskeletal: He exhibits no edema.   Neurological: He is alert and oriented to person, place, and time.     Assessment:     1. Physical exam    2. Closed compression fracture of L1 lumbar vertebra with routine healing, subsequent encounter      Plan:   Lavell was seen today for annual exam.    Diagnoses and all orders for this visit:    Physical exam: Health Maintenance    Closed compression fracture of L1 lumbar vertebra with routine healing, subsequent encounter: patient will do bone density but does not wish to take Prolia      Follow-up in about 6 months (around 1/2/2019) for Follow up 6 months.     Medication List          Accurate as of 7/2/18  2:32 PM. If you have any questions, ask your nurse or doctor.               CONTINUE taking these medications    calcium citrate-vitamin D3 315-200 mg 315-200 mg-unit per tablet  Commonly known as:  CITRACAL+D     levothyroxine 50 MCG tablet  Commonly known as:  SYNTHROID  TAKE 1 TABLET BY MOUTH EVERY DAY     MULTIVITAMIN ORAL   "   OMEGA-3 ORAL     saw palmetto 160 MG capsule        STOP taking these medications    meloxicam 15 MG tablet  Commonly known as:  MOBIC  Stopped by:  Rosalva Howard MD

## 2018-07-03 DIAGNOSIS — M25.562 ACUTE PAIN OF LEFT KNEE: ICD-10-CM

## 2018-07-03 RX ORDER — NAPROXEN 500 MG/1
TABLET ORAL
Qty: 30 TABLET | Refills: 0 | OUTPATIENT
Start: 2018-07-03

## 2018-07-05 ENCOUNTER — HOSPITAL ENCOUNTER (OUTPATIENT)
Dept: RADIOLOGY | Facility: CLINIC | Age: 83
Discharge: HOME | End: 2018-07-05
Attending: INTERNAL MEDICINE
Payer: MEDICARE

## 2018-07-05 DIAGNOSIS — M84.68XA PATHOLOGICAL FRACTURE IN OTHER DISEASE, OTHER SITE, INITIAL ENCOUNTER FOR FRACTURE: ICD-10-CM

## 2018-07-05 DIAGNOSIS — M80.00XD AGE-RELATED OSTEOPOROSIS WITH CURRENT PATHOLOGICAL FRACTURE WITH ROUTINE HEALING: ICD-10-CM

## 2018-07-05 DIAGNOSIS — S32.010D CLOSED COMPRESSION FRACTURE OF L1 LUMBAR VERTEBRA WITH ROUTINE HEALING, SUBSEQUENT ENCOUNTER: ICD-10-CM

## 2018-07-05 PROCEDURE — 77080 DXA BONE DENSITY AXIAL: CPT | Mod: TC

## 2018-07-05 PROCEDURE — 77080 DXA BONE DENSITY AXIAL: CPT | Mod: 26,,, | Performed by: INTERNAL MEDICINE

## 2018-07-16 ENCOUNTER — TELEPHONE (OUTPATIENT)
Dept: ENDOCRINOLOGY | Facility: CLINIC | Age: 83
End: 2018-07-16

## 2018-07-16 ENCOUNTER — PATIENT MESSAGE (OUTPATIENT)
Dept: ENDOCRINOLOGY | Facility: CLINIC | Age: 83
End: 2018-07-16

## 2018-07-16 ENCOUNTER — OFFICE VISIT (OUTPATIENT)
Dept: INTERNAL MEDICINE | Facility: CLINIC | Age: 83
End: 2018-07-16
Payer: MEDICARE

## 2018-07-16 VITALS
BODY MASS INDEX: 23.15 KG/M2 | DIASTOLIC BLOOD PRESSURE: 78 MMHG | WEIGHT: 152.75 LBS | HEART RATE: 77 BPM | OXYGEN SATURATION: 95 % | SYSTOLIC BLOOD PRESSURE: 122 MMHG | HEIGHT: 68 IN

## 2018-07-16 DIAGNOSIS — G31.84 MCI (MILD COGNITIVE IMPAIRMENT) WITH MEMORY LOSS: ICD-10-CM

## 2018-07-16 DIAGNOSIS — S32.010A CLOSED COMPRESSION FRACTURE OF FIRST LUMBAR VERTEBRA, INITIAL ENCOUNTER: ICD-10-CM

## 2018-07-16 DIAGNOSIS — M17.12 PRIMARY OSTEOARTHRITIS OF LEFT KNEE: Primary | ICD-10-CM

## 2018-07-16 DIAGNOSIS — Z79.899 MEDICATION MANAGEMENT: ICD-10-CM

## 2018-07-16 PROCEDURE — 99999 PR PBB SHADOW E&M-EST. PATIENT-LVL III: CPT | Mod: PBBFAC,,, | Performed by: INTERNAL MEDICINE

## 2018-07-16 PROCEDURE — 99214 OFFICE O/P EST MOD 30 MIN: CPT | Mod: S$GLB,,, | Performed by: INTERNAL MEDICINE

## 2018-07-16 RX ORDER — DICLOFENAC SODIUM 10 MG/G
2 GEL TOPICAL 4 TIMES DAILY PRN
Qty: 100 G | Refills: 0 | Status: SHIPPED | OUTPATIENT
Start: 2018-07-16 | End: 2018-08-16

## 2018-07-16 NOTE — TELEPHONE ENCOUNTER
Osteopenia with high FRAX  Needs a little more vit D -- add OTC 1000IUs daily  Discussed at office appointment  Has memory difficulties    PLAN:  Please call:  Add vitamin D 1000 IUs daily  sukhdev ordered to schedule in about 1 month  F/u in one - 2 years.

## 2018-07-16 NOTE — TELEPHONE ENCOUNTER
----- Message from Sravanthi Sal MD sent at 7/16/2018  7:22 AM CDT -----  I forgot to send you the message. Can u please call. He has difficulties with his memory.  Needs more vitamin D and prlia in one month.  See chart for details  SD

## 2018-07-16 NOTE — PROGRESS NOTES
Subjective:      Patient ID: Lavell Coyle is a 86 y.o. male.    Chief Complaint: Knee Pain (Left knee pain  )    HPI:  HPI   The patient has a history of cognitive impairment.  He seems to be having somewhat more trouble today.  He asked about evaluations that we have discussed in the past including that of his compression fracture, referral to endocrine in the use of Prolia.  During this appointment I have reviewed this again with him is well is printed office appointments and highlighted the pertinent areas    Discussion of knee left:  Patient has a mild left knee discomfort which he states is a 2/10 on a pain scale of 10/10.  It is aggravated by climbing stairs and he references a recent vacation that he took.  I reviewed the orthopedics appointment with them in the specifics of the orthopedic appointment as well as the recommendations.  He was confused with respect to where he may have ordered a knee brace but I have suggested to him for any long walking 1 any sports shop as well as any pharmacy would be appropriate.  We discussed medications and I think for him the best would be a cream this would be the 1 that he would be able to associate with the least amount of memory mistakes.  I printed the information for him    Discussion of Prolia:  I discussed Endocrinology appointment reviewed the use of Prolia and printed material for him  Patient Active Problem List   Diagnosis    BPH with urinary obstruction    Thyroid nodule    Age-related osteoporosis with current pathological fracture with routine healing    Skin cancer    History of melanoma excision    Basal cell cancer    MCI (mild cognitive impairment) with memory loss    Hypothyroidism    Medial meniscus tear    S/P R knee surgery, medial/lateral menisectomy, loose body removal    Range of motion deficit    Decreased strength    Nuclear cataract    Ocular migraine    Blepharitis of both eyes    Bilateral dry eyes    Memory loss     "Tortuous aorta    Meningiomas, multiple    Closed compression fracture of first lumbar vertebra    Acute left-sided low back pain without sciatica     Past Medical History:   Diagnosis Date    Allergic blepharitis 5/20/2014    Basal cell cancer     History of mononucleosis     pupura    History of osteopenia     Hypothyroidism     Melanoma     Osteopenia     Prostate hypertrophy     Thrombocytopenia 2002    Thrombocytopenia 2002    Thyroid disease     Thyroid nodule 07/2009    left nodule     Past Surgical History:   Procedure Laterality Date    CARPAL TUNNEL RELEASE      left    KNEE ARTHROSCOPY      KNEE SURGERY      Moh      surgery    ROTATOR CUFF REPAIR      right    SKIN CANCER EXCISION       Family History   Problem Relation Age of Onset    Cancer Other     Alcohol abuse Father     Prostate cancer Neg Hx     Kidney disease Neg Hx      Review of Systems   discussion  Objective:     Vitals:    07/16/18 1324   BP: 122/78   Pulse: 77   SpO2: 95%   Weight: 69.3 kg (152 lb 12.5 oz)   Height: 5' 8" (1.727 m)   PainSc:   2   PainLoc: Knee     Body mass index is 23.23 kg/m².  Physical Exam   Knee no swelling good strength    Assessment:     1. Primary osteoarthritis of left knee    2. Medication management    3. Closed compression fracture of first lumbar vertebra, initial encounter    4. MCI (mild cognitive impairment) with memory loss      Plan:   Lavell was seen today for knee pain.    Diagnoses and all orders for this visit:    Primary osteoarthritis of left knee:  At this point I would suggest using the Voltaren gel in the use the a Velcro knee support  -     diclofenac sodium (VOLTAREN) 1 % Gel; Apply 2 grams topically 4 (four) times daily as needed.    Medication management:  I reviewed the medications with the patient to include diclofenac as well as Prolia    Closed compression fracture of first lumbar vertebra, initial encounter:  Prolia is recommended to the patient    MCI (mild " cognitive impairment) with memory loss:  Patient is having an additional difficulty with his memory today and therefore all materials were printed and highlighted      Follow-up if symptoms worsen or fail to improve.     Medication List          Accurate as of 7/16/18  4:52 PM. If you have any questions, ask your nurse or doctor.               START taking these medications    VOLTAREN 1 % Gel  Generic drug:  diclofenac sodium  Apply 2 grams topically 4 (four) times daily as needed.  Started by:  Rosalva Howard MD        CONTINUE taking these medications    calcium citrate-vitamin D3 315-200 mg 315-200 mg-unit per tablet  Commonly known as:  CITRACAL+D     levothyroxine 50 MCG tablet  Commonly known as:  SYNTHROID  TAKE 1 TABLET BY MOUTH EVERY DAY     MULTIVITAMIN ORAL     OMEGA-3 ORAL     saw palmetto 160 MG capsule           Where to Get Your Medications      These medications were sent to [facility]  [address] [place] [id number]    Hours:  Mon-Fri, 8a-5:30p Phone:  [phone]   · VOLTAREN 1 % Gel

## 2018-07-16 NOTE — PATIENT INSTRUCTIONS
Velcro knee brace generally you can find this in a pharmacy, or sports store.    Prescription:  Diclofenac gel which can be used four times a day on the knee.  Denosumab injection  What is this medicine?  DENOSUMAB (den oh polina mab) slows bone breakdown. Prolia is used to treat osteoporosis in women after menopause and in men. Xgeva is used to prevent bone fractures and other bone problems caused by cancer bone metastases. Xgeva is also used to treat giant cell tumor of the bone.  How should I use this medicine?  This medicine is for injection under the skin. It is given by a health care professional in a hospital or clinic setting.  If you are getting Prolia, a special MedGuide will be given to you by the pharmacist with each prescription and refill. Be sure to read this information carefully each time.  For Prolia, talk to your pediatrician regarding the use of this medicine in children. Special care may be needed. For Xgeva, talk to your pediatrician regarding the use of this medicine in children. While this drug may be prescribed for children as young as 13 years for selected conditions, precautions do apply.  What side effects may I notice from receiving this medicine?  Side effects that you should report to your doctor or health care professional as soon as possible:  · allergic reactions like skin rash, itching or hives, swelling of the face, lips, or tongue  · breathing problems  · chest pain  · fast, irregular heartbeat  · feeling faint or lightheaded, falls  · fever, chills, or any other sign of infection  · muscle spasms, tightening, or twitches  · numbness or tingling  · skin blisters or bumps, or is dry, peels, or red  · slow healing or unexplained pain in the mouth or jaw  · unusual bleeding or bruising  Side effects that usually do not require medical attention (Report these to your doctor or health care professional if they continue or are bothersome.):  · muscle pain  · stomach upset, gas  What may  interact with this medicine?  Do not take this medicine with any of the following medications:  · other medicines containing denosumab  This medicine may also interact with the following medications:  · medicines that suppress the immune system  · medicines that treat cancer  · steroid medicines like prednisone or cortisone  What if I miss a dose?  It is important not to miss your dose. Call your doctor or health care professional if you are unable to keep an appointment.  Where should I keep my medicine?  This medicine is only given in a clinic, doctor's office, or other health care setting and will not be stored at home.  What should I tell my health care provider before I take this medicine?  They need to know if you have any of these conditions:  · dental disease  · eczema  · infection or history of infections  · kidney disease or on dialysis  · low blood calcium or vitamin D  · malabsorption syndrome  · scheduled to have surgery or tooth extraction  · taking medicine that contains denosumab  · thyroid or parathyroid disease  · an unusual reaction to denosumab, other medicines, foods, dyes, or preservatives  · pregnant or trying to get pregnant  · breast-feeding  What should I watch for while using this medicine?  Visit your doctor or health care professional for regular checks on your progress. Your doctor or health care professional may order blood tests and other tests to see how you are doing.  Call your doctor or health care professional if you get a cold or other infection while receiving this medicine. Do not treat yourself. This medicine may decrease your body's ability to fight infection.  You should make sure you get enough calcium and vitamin D while you are taking this medicine, unless your doctor tells you not to. Discuss the foods you eat and the vitamins you take with your health care professional.  See your dentist regularly. Brush and floss your teeth as directed. Before you have any dental work  done, tell your dentist you are receiving this medicine.  Do not become pregnant while taking this medicine or for 5 months after stopping it. Women should inform their doctor if they wish to become pregnant or think they might be pregnant. There is a potential for serious side effects to an unborn child. Talk to your health care professional or pharmacist for more information.  NOTE:This sheet is a summary. It may not cover all possible information. If you have questions about this medicine, talk to your doctor, pharmacist, or health care provider. Copyright© 2017 [employer]        Diclofenac skin gel  What is this medicine?  DICLOFENAC (dye KLOE fen ak) is a non-steroidal anti-inflammatory drug (NSAID). The 1% skin gel is used to treat osteoarthritis of the hands or knees. The 3% skin gel is used to treat actinic keratosis.  How should I use this medicine?  This medicine is for external use only. Follow the directions on the prescription label. Wash hands before and after use. Do not get this medicine in your eyes. If you do, rinse out with plenty of cool tap water. Use your doses at regular intervals. Do not use your medicine more often than directed.  A special MedGuide will be given to you by the pharmacist with each prescription and refill of the 1% gel. Be sure to read this information carefully each time.  Talk to your pediatrician regarding the use of this medicine in children. Special care may be needed. The 3% gel is not approved for use in children.  What side effects may I notice from receiving this medicine?  Side effects that you should report to your doctor or health care professional as soon as possible:  · allergic reactions like skin rash, itching or hives, swelling of the face, lips, or tongue  · black or bloody stools, blood in the urine or vomit  · blurred vision  · chest pain  · difficulty breathing or wheezing  · nausea or vomiting  · redness, blistering, peeling or loosening of the skin,  including inside the mouth  · slurred speech or weakness on one side of the body  · trouble passing urine or change in the amount of urine  · unexplained weight gain or swelling  · unusually weak or tired  · yellowing of eyes or skin  Side effects that usually do not require medical attention (report to your doctor or health care professional if they continue or are bothersome):  · dizziness  · dry skin  · headache  · heartburn  · increased sensitivity to the sun  · stomach pain  · tingling at the application site  What may interact with this medicine?  · aspirin  · NSAIDs, medicines for pain and inflammation, like ibuprofen or naproxen  Do not use any other skin products without telling your doctor or health care professional.  What if I miss a dose?  If you miss a dose, use it as soon as you can. If it is almost time for your next dose, use only that dose. Do not use double or extra doses.  Where should I keep my medicine?  Keep out of the reach of children.  Store the 1% gel at room temperature between 15 and 30 degrees C (59 and 86 degrees F). Store the 3% gel at room temperature between 20 and 25 degrees C (68 and 77 degrees F). Protect from light. Throw away any unused medicine after the expiration date.  What should I tell my health care provider before I take this medicine?  They need to know if you have any of these conditions:  · asthma  · bleeding problems  · coronary artery bypass graft (CABG) surgery within the past 2 weeks  · heart disease  · high blood pressure  · if you frequently drink alcohol containing drinks  · kidney disease  · liver disease  · open or infected skin  · stomach problems  · an unusual or allergic reaction to diclofenac, aspirin, other NSAIDs, other medicines, benzyl alcohol (3% gel only), foods, dyes, or preservatives  · pregnant or trying to get pregnant  · breast-feeding  What should I watch for while using this medicine?  Tell your doctor or healthcare professional if your  symptoms do not start to get better or if they get worse. You will need to follow up with your health care provider to monitor your progress. You may need to be treated for up to 3 months if you are using the 3% gel, but the full effect may not occur until 1 month after stopping treatment. If you develop a severe skin reaction, contact your doctor or health care professional immediately.  This medicine can make you more sensitive to the sun. Keep out of the sun. If you cannot avoid being in the sun, wear protective clothing and use sunscreen. Do not use sun lamps or tanning beds/booths.  Do not take medicines such as ibuprofen and naproxen with this medicine. Side effects such as stomach upset, nausea, or ulcers may be more likely to occur. Many medicines available without a prescription should not be taken with this medicine.  This medicine does not prevent heart attack or stroke. In fact, this medicine may increase the chance of a heart attack or stroke. The chance may increase with longer use of this medicine and in people who have heart disease. If you take aspirin to prevent heart attack or stroke, talk with your doctor or health care professional.  This medicine can cause ulcers and bleeding in the stomach and intestines at any time during treatment. Do not smoke cigarettes or drink alcohol. These increase irritation to your stomach and can make it more susceptible to damage from this medicine. Ulcers and bleeding can happen without warning symptoms and can cause death.  You may get drowsy or dizzy. Do not drive, use machinery, or do anything that needs mental alertness until you know how this medicine affects you. Do not stand or sit up quickly, especially if you are an older patient. This reduces the risk of dizzy or fainting spells.  This medicine can cause you to bleed more easily. Try to avoid damage to your teeth and gums when you brush or floss your teeth.  NOTE:This sheet is a summary. It may not cover  all possible information. If you have questions about this medicine, talk to your doctor, pharmacist, or health care provider. Copyright© 2017 [employer]

## 2018-08-16 ENCOUNTER — OFFICE VISIT (OUTPATIENT)
Dept: ORTHOPEDICS | Facility: CLINIC | Age: 83
End: 2018-08-16
Payer: MEDICARE

## 2018-08-16 VITALS — HEIGHT: 68 IN | BODY MASS INDEX: 23.04 KG/M2 | WEIGHT: 152 LBS

## 2018-08-16 DIAGNOSIS — M17.12 PRIMARY OSTEOARTHRITIS OF LEFT KNEE: Primary | ICD-10-CM

## 2018-08-16 PROCEDURE — 99999 PR PBB SHADOW E&M-EST. PATIENT-LVL II: CPT | Mod: PBBFAC,,, | Performed by: ORTHOPAEDIC SURGERY

## 2018-08-16 PROCEDURE — 99213 OFFICE O/P EST LOW 20 MIN: CPT | Mod: S$GLB,,, | Performed by: ORTHOPAEDIC SURGERY

## 2018-08-16 NOTE — PROGRESS NOTES
Subjective:      Patient ID: Lavell Coyle is a 86 y.o. male.    Chief Complaint: Pain of the Left Knee    HPI     Follow-up for mild osteoarthritis.  The patient reports that he generally feels well with minimal pain. He is seeking advice about appropriate exercise activities going forward.    Review of Systems   Constitution: Negative for fever and weight loss.   HENT: Negative for congestion.    Eyes: Negative for visual disturbance.   Cardiovascular: Negative for chest pain.   Respiratory: Negative for shortness of breath.    Hematologic/Lymphatic: Negative for bleeding problem. Does not bruise/bleed easily.   Skin: Negative for poor wound healing.   Gastrointestinal: Negative for abdominal pain.   Genitourinary: Negative for dysuria.   Neurological: Negative for seizures.   Psychiatric/Behavioral: Negative for altered mental status.   Allergic/Immunologic: Negative for persistent infections.         Objective:            Ortho/SPM Exam    Left knee-    The patient is not in acute distress.   Body habitus is normal.   The patient walks without a limp.  Resisted SLR negative.   The skin over the knee is intact.  Knee effusion 0  Tendernes is located absent.  Range of motion- Flexion full, Extension full.   Ligament exam:   MCL intact   Lachman intact              Post sag intact    LCL intact  Patellar apprehension negative.  Popliteal cyst negative  Patellar crepitation absent.  Flexion/pinch negative.  Pulses DP present, PT present.  Motor normal 5/5 strength in all tested muscle groups.   Sensory normal.              Assessment:       Encounter Diagnosis   Name Primary?    Primary osteoarthritis of left knee Yes          Plan:       Lavell was seen today for pain.    Diagnoses and all orders for this visit:    Primary osteoarthritis of left knee        Considering the patient's mild structural changes, I recommend a low impact regimen with avoidance of flexion loading.  I explained this in detail and  demonstrated appropriate exercises for him to work on in the gym.

## 2018-08-31 RX ORDER — MELOXICAM 15 MG/1
TABLET ORAL
Qty: 30 TABLET | Refills: 0 | Status: SHIPPED | OUTPATIENT
Start: 2018-08-31 | End: 2019-05-29

## 2018-09-20 ENCOUNTER — TELEPHONE (OUTPATIENT)
Dept: INTERNAL MEDICINE | Facility: CLINIC | Age: 83
End: 2018-09-20

## 2018-09-24 ENCOUNTER — DOCUMENTATION ONLY (OUTPATIENT)
Dept: REHABILITATION | Facility: OTHER | Age: 83
End: 2018-09-24

## 2018-09-24 NOTE — PROGRESS NOTES
DC NOTE FOR [facility] PT     Pt was treated 7 times from 4/9/18 to 5/9/18.  Treatments consisted of stretching and strengthening exercises for the lumbar spine.  Goals of PT nearly met.  Pt was planning on DC at Visit 10.  Pt did not return for any further follow up, self DC.

## 2018-10-04 ENCOUNTER — OFFICE VISIT (OUTPATIENT)
Dept: INTERNAL MEDICINE | Facility: CLINIC | Age: 83
End: 2018-10-04
Payer: MEDICARE

## 2018-10-04 VITALS
HEIGHT: 68 IN | DIASTOLIC BLOOD PRESSURE: 60 MMHG | OXYGEN SATURATION: 98 % | WEIGHT: 155.63 LBS | SYSTOLIC BLOOD PRESSURE: 114 MMHG | BODY MASS INDEX: 23.59 KG/M2 | HEART RATE: 65 BPM

## 2018-10-04 DIAGNOSIS — E55.9 MILD VITAMIN D DEFICIENCY: ICD-10-CM

## 2018-10-04 DIAGNOSIS — E03.8 OTHER SPECIFIED HYPOTHYROIDISM: ICD-10-CM

## 2018-10-04 DIAGNOSIS — G31.84 MCI (MILD COGNITIVE IMPAIRMENT) WITH MEMORY LOSS: Primary | ICD-10-CM

## 2018-10-04 DIAGNOSIS — E03.9 HYPOTHYROIDISM, UNSPECIFIED TYPE: ICD-10-CM

## 2018-10-04 PROCEDURE — 1101F PT FALLS ASSESS-DOCD LE1/YR: CPT | Mod: CPTII,,, | Performed by: INTERNAL MEDICINE

## 2018-10-04 PROCEDURE — 99999 PR PBB SHADOW E&M-EST. PATIENT-LVL III: CPT | Mod: PBBFAC,,, | Performed by: INTERNAL MEDICINE

## 2018-10-04 PROCEDURE — 99214 OFFICE O/P EST MOD 30 MIN: CPT | Mod: S$PBB,,, | Performed by: INTERNAL MEDICINE

## 2018-10-04 PROCEDURE — 99213 OFFICE O/P EST LOW 20 MIN: CPT | Mod: PBBFAC | Performed by: INTERNAL MEDICINE

## 2018-10-04 NOTE — PROGRESS NOTES
Subjective:      Patient ID: Lavell Coyle is a 86 y.o. male.    Chief Complaint: Follow-up    HPI:  HPI     Patient is here in follow up for medical issues. Today we spent time discussing his memory. He continues in [facility] with the [facility]. He is doing well, living alone , still driving but limited driving.    Reviewed: Endocrinology appt    We also discussed whether he was taking his medications.. His is taking his thyroid medications.    Additionally patient did not wish to take additional medication since his compression fracture. He is taking vit D     Annual exam: 7/2/2018  Colonoscopy: 7/14/2009: now 85 and no history  PSA 0.55  Optho: Dr. Santillan due soon  Flu:in the fall  Tetanus: 2/12/2013  Shingles:has not done this  Pneumovax: done  Prevnar: done     Consultant:  Radha Wolff  Cataract check  Patient Active Problem List   Diagnosis    BPH with urinary obstruction    Thyroid nodule    Age-related osteoporosis with current pathological fracture with routine healing    Skin cancer    History of melanoma excision    Basal cell cancer    MCI (mild cognitive impairment) with memory loss    Hypothyroidism    Medial meniscus tear    S/P R knee surgery, medial/lateral menisectomy, loose body removal    Range of motion deficit    Decreased strength    Nuclear cataract    Ocular migraine    Blepharitis of both eyes    Bilateral dry eyes    Memory loss    Tortuous aorta    Meningiomas, multiple    Closed compression fracture of first lumbar vertebra    Acute left-sided low back pain without sciatica     Past Medical History:   Diagnosis Date    Allergic blepharitis 5/20/2014    Basal cell cancer     History of mononucleosis     pupura    History of osteopenia     Hypothyroidism     Melanoma     Osteopenia     Prostate hypertrophy     Thrombocytopenia 2002    Thrombocytopenia 2002    Thyroid disease     Thyroid nodule 07/2009    left nodule     Past Surgical History:   Procedure  "Laterality Date    ARTHROSCOPY, KNEE Right 4/23/2014    [facility]    CARPAL TUNNEL RELEASE      left    CHONDRECTOMY, KNEE, SEMILUNAR CARTILAGE Right 4/23/2014    [facility] at [facility] OR    KNEE ARTHROSCOPY      KNEE SURGERY      Moh      surgery    ROTATOR CUFF REPAIR      right    SKIN CANCER EXCISION       Family History   Problem Relation Age of Onset    Cancer Other     Alcohol abuse Father     Prostate cancer Neg Hx     Kidney disease Neg Hx      Review of Systems   Constitutional: Negative for appetite change, chills, fever and unexpected weight change.   Respiratory: Negative for shortness of breath and wheezing.    Cardiovascular: Negative for chest pain, palpitations and leg swelling.   Gastrointestinal: Negative for abdominal pain, blood in stool, diarrhea, nausea and vomiting.     Objective:     Vitals:    10/04/18 1411   BP: 114/60   Pulse: 65   SpO2: 98%   Weight: 70.6 kg (155 lb 10.3 oz)   Height: 5' 8" (1.727 m)   PainSc: 0-No pain     Body mass index is 23.67 kg/m².  Physical Exam   Constitutional: He is oriented to person, place, and time. He appears well-developed and well-nourished. No distress.   Neck: Carotid bruit is not present. No thyromegaly present.   Cardiovascular: Normal rate, regular rhythm and normal heart sounds. PMI is not displaced.   Pulmonary/Chest: Effort normal and breath sounds normal. No respiratory distress.   Abdominal: Soft. Bowel sounds are normal. He exhibits no distension. There is no tenderness.   Musculoskeletal: He exhibits no edema.   Neurological: He is alert and oriented to person, place, and time.     Assessment:     1. MCI (mild cognitive impairment) with memory loss    2. Hypothyroidism, unspecified type    3. Other specified hypothyroidism    4. Mild vitamin D deficiency      Plan:   Lavell was seen today for follow-up.    Diagnoses and all orders for this visit:    MCI (mild cognitive " impairment) with memory loss:  Patient is to continue at the [facility] with the memory matters program which she seems to enjoy and I do believe is making a difference and keeping this patient is stable    Hypothyroidism, unspecified type.:  Patient states he is taking his thyroid medicine.    Other specified hypothyroidism  -     CBC auto differential; Future  -     Comprehensive metabolic panel; Future  -     Lipid panel; Future  -     TSH; Future    Mild vitamin D deficiency:  Patient did have a workup for osteoporosis after is compression fracture we have reviewed this on several occasions he is request is that he will only be on vitamin-D.  -     Vitamin D; Future        Problem List Items Addressed This Visit     MCI (mild cognitive impairment) with memory loss - Primary    Hypothyroidism    Relevant Orders    CBC auto differential    Comprehensive metabolic panel    Lipid panel    TSH      Other Visit Diagnoses     Mild vitamin D deficiency        Relevant Orders    Vitamin D        Orders Placed This Encounter   Procedures    CBC auto differential     Standing Status:   Future     Standing Expiration Date:   9/4/2019    Comprehensive metabolic panel     Standing Status:   Future     Standing Expiration Date:   9/4/2019    Lipid panel     Standing Status:   Future     Standing Expiration Date:   9/4/2019    TSH     Standing Status:   Future     Standing Expiration Date:   9/4/2019    Vitamin D     Standing Status:   Future     Standing Expiration Date:   9/4/2019     Follow-up in about 6 months (around 4/4/2019) for FU with cbc, cmp lipid tsh vit d.     Medication List           Accurate as of 10/4/18 11:59 PM. If you have any questions, ask your nurse or doctor.               CONTINUE taking these medications    calcium citrate-vitamin D3 315-200 mg 315-200 mg-unit per tablet  Commonly known as:  CITRACAL+D     levothyroxine 50 MCG tablet  Commonly known as:  SYNTHROID  TAKE 1 TABLET BY MOUTH EVERY DAY      meloxicam 15 MG tablet  Commonly known as:  MOBIC  TAKE 1 TABLET(15 MG) BY MOUTH EVERY DAY     MULTIVITAMIN ORAL     OMEGA-3 ORAL     saw palmetto 160 MG capsule

## 2019-01-14 ENCOUNTER — OFFICE VISIT (OUTPATIENT)
Dept: OTOLARYNGOLOGY | Facility: CLINIC | Age: 84
End: 2019-01-14
Payer: MEDICARE

## 2019-01-14 VITALS
WEIGHT: 154.31 LBS | SYSTOLIC BLOOD PRESSURE: 148 MMHG | BODY MASS INDEX: 23.46 KG/M2 | DIASTOLIC BLOOD PRESSURE: 78 MMHG | HEART RATE: 65 BPM

## 2019-01-14 DIAGNOSIS — H61.893 DRY EAR CANAL, BILATERAL: ICD-10-CM

## 2019-01-14 DIAGNOSIS — H61.23 BILATERAL IMPACTED CERUMEN: Primary | ICD-10-CM

## 2019-01-14 PROCEDURE — 99499 UNLISTED E&M SERVICE: CPT | Mod: HCNC,S$GLB,, | Performed by: OTOLARYNGOLOGY

## 2019-01-14 PROCEDURE — 69210 PR REMOVAL IMPACTED CERUMEN REQUIRING INSTRUMENTATION, UNILATERAL: ICD-10-PCS | Mod: HCNC,S$GLB,, | Performed by: OTOLARYNGOLOGY

## 2019-01-14 PROCEDURE — 99999 PR PBB SHADOW E&M-EST. PATIENT-LVL III: ICD-10-PCS | Mod: PBBFAC,HCNC,, | Performed by: OTOLARYNGOLOGY

## 2019-01-14 PROCEDURE — 69210 REMOVE IMPACTED EAR WAX UNI: CPT | Mod: HCNC,S$GLB,, | Performed by: OTOLARYNGOLOGY

## 2019-01-14 PROCEDURE — 99499 NO LOS: ICD-10-PCS | Mod: HCNC,S$GLB,, | Performed by: OTOLARYNGOLOGY

## 2019-01-14 PROCEDURE — 99999 PR PBB SHADOW E&M-EST. PATIENT-LVL III: CPT | Mod: PBBFAC,HCNC,, | Performed by: OTOLARYNGOLOGY

## 2019-01-14 NOTE — PATIENT INSTRUCTIONS
Dry cerumen plugs removed from both eacs  RTC at least yearly for ear cleaning/prn  Pt. may lubricate dry ear canals with drop of mineral oil or baby oil prn  Audiomety offered/deferred per patient

## 2019-01-14 NOTE — PROGRESS NOTES
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  His ears were last cleaned by me in early November 2017.  Dry cerumen impactions were extracted from each ear canal at that visit.  He is here for an ear cleaning procedure due to a clogged sensation in both ears.    Past Medical History:   Diagnosis Date    Allergic blepharitis 5/20/2014    Basal cell cancer     History of mononucleosis     pupura    History of osteopenia     Hypothyroidism     Melanoma     Osteopenia     Prostate hypertrophy     Thrombocytopenia 2002    Thrombocytopenia 2002    Thyroid disease     Thyroid nodule 07/2009    left nodule     Current Outpatient Medications on File Prior to Visit   Medication Sig Dispense Refill    calcium citrate-vitamin D3 315-200 mg (CITRACAL+D) 315-200 mg-unit per tablet Take 1 tablet by mouth once daily.      levothyroxine (SYNTHROID) 50 MCG tablet TAKE 1 TABLET BY MOUTH EVERY DAY 30 tablet 12    meloxicam (MOBIC) 15 MG tablet TAKE 1 TABLET(15 MG) BY MOUTH EVERY DAY 30 tablet 0    MULTIVITAMIN ORAL Take by mouth.      OMEGA-3/DHA/EPA/FISH OIL (OMEGA-3 ORAL) Take by mouth.      saw palmetto 160 MG capsule Take 160 mg by mouth 2 (two) times daily.         No current facility-administered medications on file prior to visit.          PE:  Blood pressure 148/78 pulse 65 height 5 ft 8 in weight 154 lb  General:  Alert and oriented gentleman in no acute distress  Both ears were examined under the microscope in the micro procedure room  Procedure:  A large dry cerumen impaction is carefully extracted from the  semi occluded right ear canal with a blunt curette an angle pick.  A large semi occlusive cerumen plug is extracted from left ear canal with a blunt curette an angle pick.  Both eardrums are intact and clear as visualized.  The patient indicates improvement in hearing  after the procedures.    He is offered an audiometric study today which is deferred by him.      DIAGNOSIS:     ICD-10-CM ICD-9-CM    1. Bilateral impacted cerumen H61.23 380.4    2. Dry ear canal, bilateral H61.893 380.89      PLAN: Dry cerumen plugs removed from both eacs  RTC at least yearly for ear cleaning/prn  Pt. may lubricate dry ear canals with drop of mineral oil or baby oil prn  Audiomety offered/deferred per patient

## 2019-01-25 ENCOUNTER — PES CALL (OUTPATIENT)
Dept: ADMINISTRATIVE | Facility: CLINIC | Age: 84
End: 2019-01-25

## 2019-02-05 RX ORDER — LEVOTHYROXINE SODIUM 50 UG/1
TABLET ORAL
Qty: 30 TABLET | Refills: 12 | Status: SHIPPED | OUTPATIENT
Start: 2019-02-05 | End: 2020-03-04

## 2019-03-25 ENCOUNTER — CLINICAL SUPPORT (OUTPATIENT)
Dept: AUDIOLOGY | Facility: CLINIC | Age: 84
End: 2019-03-25
Payer: MEDICARE

## 2019-03-25 ENCOUNTER — OFFICE VISIT (OUTPATIENT)
Dept: OTOLARYNGOLOGY | Facility: CLINIC | Age: 84
End: 2019-03-25
Payer: MEDICARE

## 2019-03-25 VITALS — HEART RATE: 65 BPM | SYSTOLIC BLOOD PRESSURE: 135 MMHG | DIASTOLIC BLOOD PRESSURE: 80 MMHG

## 2019-03-25 DIAGNOSIS — H91.93 PROGRESSIVE HEARING LOSS OF BOTH EARS: ICD-10-CM

## 2019-03-25 DIAGNOSIS — H90.3 HEARING LOSS, SENSORINEURAL, HIGH FREQUENCY, BILATERAL: Primary | ICD-10-CM

## 2019-03-25 DIAGNOSIS — H90.3 SENSORY HEARING LOSS, BILATERAL: Primary | ICD-10-CM

## 2019-03-25 PROCEDURE — 99999 PR PBB SHADOW E&M-EST. PATIENT-LVL I: CPT | Mod: PBBFAC,HCNC,,

## 2019-03-25 PROCEDURE — 99999 PR PBB SHADOW E&M-EST. PATIENT-LVL III: CPT | Mod: PBBFAC,HCNC,, | Performed by: OTOLARYNGOLOGY

## 2019-03-25 PROCEDURE — 1101F PR PT FALLS ASSESS DOC 0-1 FALLS W/OUT INJ PAST YR: ICD-10-PCS | Mod: HCNC,CPTII,S$GLB, | Performed by: OTOLARYNGOLOGY

## 2019-03-25 PROCEDURE — 99213 PR OFFICE/OUTPT VISIT, EST, LEVL III, 20-29 MIN: ICD-10-PCS | Mod: HCNC,S$GLB,, | Performed by: OTOLARYNGOLOGY

## 2019-03-25 PROCEDURE — 99213 OFFICE O/P EST LOW 20 MIN: CPT | Mod: HCNC,S$GLB,, | Performed by: OTOLARYNGOLOGY

## 2019-03-25 PROCEDURE — 92557 PR COMPREHENSIVE HEARING TEST: ICD-10-PCS | Mod: HCNC,S$GLB,, | Performed by: AUDIOLOGIST

## 2019-03-25 PROCEDURE — 1101F PT FALLS ASSESS-DOCD LE1/YR: CPT | Mod: HCNC,CPTII,S$GLB, | Performed by: OTOLARYNGOLOGY

## 2019-03-25 PROCEDURE — 99999 PR PBB SHADOW E&M-EST. PATIENT-LVL III: ICD-10-PCS | Mod: PBBFAC,HCNC,, | Performed by: OTOLARYNGOLOGY

## 2019-03-25 PROCEDURE — 92557 COMPREHENSIVE HEARING TEST: CPT | Mod: HCNC,S$GLB,, | Performed by: AUDIOLOGIST

## 2019-03-25 PROCEDURE — 99999 PR PBB SHADOW E&M-EST. PATIENT-LVL I: ICD-10-PCS | Mod: PBBFAC,HCNC,,

## 2019-03-25 NOTE — PROGRESS NOTES
Audiologic Evaluation    Lavell Coyle was seen on the above date for a hearing evaluation. Lavell Coyle reports decreased hearing sensitivity. Lavell Coyle denies tinnitus, aural fullness and dizziness.    Audiometric testing via insert ear phones indicated a moderate to severe sensorineural hearing loss for 250-8000 Hz in the left ear and a moderately-severe to moderate sensorineural hearing loss in the right ear. Pure tone average and speech recognition threshold were in good agreement. Good speech discrimination ability was demonstrated in quiet when novel words were presented at an amplified level in each ear.      Recommendations:  1) Otologic consultation.  2) Annual audiometric testing to monitor hearing sensitivity.  3) Hearing loss consultation pending medical clearance.

## 2019-03-25 NOTE — PATIENT INSTRUCTIONS
Audiometry reviewed  Pt. is a candidate for amplification for one or both ears  Copy of audiogram/DIANE Hooper's card/Rx to obtain hearing aid(s) provided  Monitor hearing yearly

## 2019-03-25 NOTE — PROGRESS NOTES
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  He was not exposed to significant noise levels other then his dental drill.  I last examined him in mid January of this year for extraction of cerumen plugs from both ear canals.  His medical history is significant for mild cognitive impairment and memory loss.    He completed an MRI scan of the brain in November 2012 which indicated very small foci of extra-axial enhancement most consistent with tiny meningiomas rising from the falx in the inferior supra orbital frontal region and parietal region in the midline. The study was otherwise unremarkable.    He completed an audiometric study earlier today, the results of which were reviewed with him in detail.  He lives alone.    Past Medical History:   Diagnosis Date    Allergic blepharitis 5/20/2014    Basal cell cancer     History of mononucleosis     pupura    History of osteopenia     Hypothyroidism     Melanoma     Osteopenia     Prostate hypertrophy     Thrombocytopenia 2002    Thrombocytopenia 2002    Thyroid disease     Thyroid nodule 07/2009    left nodule    No known allergies  Past Surgical History:   Procedure Laterality Date    ARTHROSCOPY, KNEE Right 4/23/2014    [facility] OR    CARPAL TUNNEL RELEASE      left    CHONDRECTOMY, KNEE, SEMILUNAR CARTILAGE Right 4/23/2014    [facility] OR    KNEE ARTHROSCOPY      KNEE SURGERY      [facility]      surgery    ROTATOR CUFF REPAIR      right    SKIN CANCER EXCISION       Current Outpatient Medications on File Prior to Visit   Medication Sig Dispense Refill    calcium citrate-vitamin D3 315-200 mg (CITRACAL+D) 315-200 mg-unit per tablet Take 1 tablet by mouth once daily.      levothyroxine (SYNTHROID) 50 MCG tablet TAKE 1 TABLET BY MOUTH EVERY DAY 30 tablet 12    meloxicam (MOBIC) 15 MG tablet TAKE 1 TABLET(15 MG) BY  MOUTH EVERY DAY 30 tablet 0    MULTIVITAMIN ORAL Take by mouth.      OMEGA-3/DHA/EPA/FISH OIL (OMEGA-3 ORAL) Take by mouth.      saw palmetto 160 MG capsule Take 160 mg by mouth 2 (two) times daily.         No current facility-administered medications on file prior to visit.            PE:  Blood pressure 135/80 pulse 65 height 5 ft 8 in weight 154 lb  General:  Alert and oriented gentleman in no acute distress  Both ears were examined under the microscope in the micro procedure room.  Several small pieces of cerumen extracted from the right ear canal. Some cerumen pieces were extracted from left ear canal.  Both eardrums are intact and clear as visualized directly.    Patient completed an audiometric study performed by the [facility] audiology service this afternoon.  The study is  duplicated below the results are reviewed with him in detail    DIAGNOSIS:     ICD-10-CM ICD-9-CM    1. Hearing loss, sensorineural, high frequency, bilateral H91.93 389.8    2. Progressive hearing loss of both ears H91.93 389.9      PLAN: Audiometry reviewed  Pt. is a candidate for amplification for one or both ears  Copy of audiogram/DIANE Hooper's card/Rx to obtain hearing aid(s) provided  Monitor hearing yearly

## 2019-03-28 ENCOUNTER — TELEPHONE (OUTPATIENT)
Dept: AUDIOLOGY | Facility: CLINIC | Age: 84
End: 2019-03-28

## 2019-04-04 ENCOUNTER — TELEPHONE (OUTPATIENT)
Dept: AUDIOLOGY | Facility: CLINIC | Age: 84
End: 2019-04-04

## 2019-05-03 ENCOUNTER — TELEPHONE (OUTPATIENT)
Dept: INTERNAL MEDICINE | Facility: CLINIC | Age: 84
End: 2019-05-03

## 2019-05-03 NOTE — TELEPHONE ENCOUNTER
----- Message from Graciela Robison sent at 5/3/2019  2:42 PM CDT -----  Contact: [phone]  Patient is requesting a call from the office. Patient is requesting a prescription for sleep.     Please advise, thanks       [facility] [address] [place] [address] [address] AT [facility] [address]   [phone] (Phone)  [phone] (Fax)

## 2019-05-07 ENCOUNTER — TELEPHONE (OUTPATIENT)
Dept: INTERNAL MEDICINE | Facility: CLINIC | Age: 84
End: 2019-05-07

## 2019-05-07 DIAGNOSIS — H91.90 DECREASED HEARING, UNSPECIFIED LATERALITY: Primary | ICD-10-CM

## 2019-05-07 NOTE — TELEPHONE ENCOUNTER
Jojo Howard MD; LUCAS TAVERAS Staff             I'm following up on this order that is needed for tomorrow.     Thanks   Jojo    Previous Messages      ----- Message -----   From: Jojo Hooper   Sent: 5/3/2019   2:53 PM   To: Rosalva Howard MD, Anita TAVERAS Staff   Subject: Annual Hearing Exam Order                         This patient is coming in for an annual audiogram on 5/8. Can you please enter an order for ambulatory hearing consult to audiology. Diagnosis code: H90.3.     Thanks   Jojo Hooper   Audiology Clinical    [facility]

## 2019-05-07 NOTE — TELEPHONE ENCOUNTER
----- Message from Jojo Hooper sent at 5/7/2019 10:22 AM CDT -----  Regarding: FW: Annual Hearing Exam Order  I'm following up on this order that is needed for tomorrow.    Thanks  Jojo    ----- Message -----  From: Jojo Hooper  Sent: 5/3/2019   2:53 PM  To: Rosalva Howard MD, Anita TAVERAS Staff  Subject: Annual Hearing Exam Order                        This patient is coming in for an annual audiogram on 5/8. Can you please enter an order for ambulatory hearing consult to audiology. Diagnosis code: H90.3.     Thanks   Jojo Hooper   Audiology Clinical    [facility]

## 2019-05-08 NOTE — TELEPHONE ENCOUNTER
Pt stated he tried melatonin and didn't work and now is taking 4 tablets of benadryl 25 mg which is not working.

## 2019-05-15 ENCOUNTER — TELEPHONE (OUTPATIENT)
Dept: INTERNAL MEDICINE | Facility: CLINIC | Age: 84
End: 2019-05-15

## 2019-05-15 NOTE — TELEPHONE ENCOUNTER
----- Message from Cassidy Sam sent at 5/15/2019  1:10 PM CDT -----  Contact: Patient [phone]  Patient wanted to reschedule the appt that he missed on Monday. First available is 05/29/2019. Requested to speak with you.    Please call and advise.    Thank You

## 2019-05-20 ENCOUNTER — PATIENT OUTREACH (OUTPATIENT)
Dept: ADMINISTRATIVE | Facility: HOSPITAL | Age: 84
End: 2019-05-20

## 2019-05-29 ENCOUNTER — OFFICE VISIT (OUTPATIENT)
Dept: INTERNAL MEDICINE | Facility: CLINIC | Age: 84
End: 2019-05-29
Payer: MEDICARE

## 2019-05-29 ENCOUNTER — LAB VISIT (OUTPATIENT)
Dept: LAB | Facility: HOSPITAL | Age: 84
End: 2019-05-29
Attending: INTERNAL MEDICINE
Payer: MEDICARE

## 2019-05-29 VITALS
SYSTOLIC BLOOD PRESSURE: 114 MMHG | HEART RATE: 78 BPM | WEIGHT: 156.31 LBS | OXYGEN SATURATION: 95 % | HEIGHT: 68 IN | BODY MASS INDEX: 23.69 KG/M2 | DIASTOLIC BLOOD PRESSURE: 60 MMHG

## 2019-05-29 DIAGNOSIS — C44.90 SKIN CANCER: ICD-10-CM

## 2019-05-29 DIAGNOSIS — E03.9 HYPOTHYROIDISM, UNSPECIFIED TYPE: Primary | ICD-10-CM

## 2019-05-29 DIAGNOSIS — E03.8 OTHER SPECIFIED HYPOTHYROIDISM: ICD-10-CM

## 2019-05-29 DIAGNOSIS — M15.9 OSTEOARTHRITIS INVOLVING MULTIPLE JOINTS ON BOTH SIDES OF BODY: ICD-10-CM

## 2019-05-29 DIAGNOSIS — E55.9 MILD VITAMIN D DEFICIENCY: ICD-10-CM

## 2019-05-29 DIAGNOSIS — I77.1 TORTUOUS AORTA: ICD-10-CM

## 2019-05-29 DIAGNOSIS — D42.9 MENINGIOMAS, MULTIPLE: ICD-10-CM

## 2019-05-29 DIAGNOSIS — E03.9 HYPOTHYROIDISM IN ADULT: ICD-10-CM

## 2019-05-29 LAB
25(OH)D3+25(OH)D2 SERPL-MCNC: 25 NG/ML (ref 30–96)
ALBUMIN SERPL BCP-MCNC: 3.6 G/DL (ref 3.5–5.2)
ALP SERPL-CCNC: 56 U/L (ref 55–135)
ALT SERPL W/O P-5'-P-CCNC: 10 U/L (ref 10–44)
ANION GAP SERPL CALC-SCNC: 7 MMOL/L (ref 8–16)
AST SERPL-CCNC: 18 U/L (ref 10–40)
BASOPHILS # BLD AUTO: 0.07 K/UL (ref 0–0.2)
BASOPHILS NFR BLD: 0.8 % (ref 0–1.9)
BILIRUB SERPL-MCNC: 0.3 MG/DL (ref 0.1–1)
BUN SERPL-MCNC: 23 MG/DL (ref 8–23)
CALCIUM SERPL-MCNC: 9.2 MG/DL (ref 8.7–10.5)
CHLORIDE SERPL-SCNC: 104 MMOL/L (ref 95–110)
CHOLEST SERPL-MCNC: 192 MG/DL (ref 120–199)
CHOLEST/HDLC SERPL: 3.3 {RATIO} (ref 2–5)
CO2 SERPL-SCNC: 28 MMOL/L (ref 23–29)
CREAT SERPL-MCNC: 1 MG/DL (ref 0.5–1.4)
DIFFERENTIAL METHOD: ABNORMAL
EOSINOPHIL # BLD AUTO: 0.1 K/UL (ref 0–0.5)
EOSINOPHIL NFR BLD: 1.6 % (ref 0–8)
ERYTHROCYTE [DISTWIDTH] IN BLOOD BY AUTOMATED COUNT: 15.1 % (ref 11.5–14.5)
EST. GFR  (AFRICAN AMERICAN): >60 ML/MIN/1.73 M^2
EST. GFR  (NON AFRICAN AMERICAN): >60 ML/MIN/1.73 M^2
GLUCOSE SERPL-MCNC: 103 MG/DL (ref 70–110)
HCT VFR BLD AUTO: 41.9 % (ref 40–54)
HDLC SERPL-MCNC: 58 MG/DL (ref 40–75)
HDLC SERPL: 30.2 % (ref 20–50)
HGB BLD-MCNC: 13.8 G/DL (ref 14–18)
LDLC SERPL CALC-MCNC: 119.2 MG/DL (ref 63–159)
LYMPHOCYTES # BLD AUTO: 1.5 K/UL (ref 1–4.8)
LYMPHOCYTES NFR BLD: 17.1 % (ref 18–48)
MCH RBC QN AUTO: 30.2 PG (ref 27–31)
MCHC RBC AUTO-ENTMCNC: 32.9 G/DL (ref 32–36)
MCV RBC AUTO: 92 FL (ref 82–98)
MONOCYTES # BLD AUTO: 0.9 K/UL (ref 0.3–1)
MONOCYTES NFR BLD: 10.3 % (ref 4–15)
NEUTROPHILS # BLD AUTO: 6.1 K/UL (ref 1.8–7.7)
NEUTROPHILS NFR BLD: 69.6 % (ref 38–73)
NONHDLC SERPL-MCNC: 134 MG/DL
PLATELET # BLD AUTO: 169 K/UL (ref 150–350)
PMV BLD AUTO: 9.8 FL (ref 9.2–12.9)
POTASSIUM SERPL-SCNC: 4.8 MMOL/L (ref 3.5–5.1)
PROT SERPL-MCNC: 6.2 G/DL (ref 6–8.4)
RBC # BLD AUTO: 4.57 M/UL (ref 4.6–6.2)
SODIUM SERPL-SCNC: 139 MMOL/L (ref 136–145)
TRIGL SERPL-MCNC: 74 MG/DL (ref 30–150)
TSH SERPL DL<=0.005 MIU/L-ACNC: 2.08 UIU/ML (ref 0.4–4)
WBC # BLD AUTO: 8.72 K/UL (ref 3.9–12.7)

## 2019-05-29 PROCEDURE — 36415 COLL VENOUS BLD VENIPUNCTURE: CPT | Mod: HCNC

## 2019-05-29 PROCEDURE — 84443 ASSAY THYROID STIM HORMONE: CPT | Mod: HCNC

## 2019-05-29 PROCEDURE — 99214 PR OFFICE/OUTPT VISIT, EST, LEVL IV, 30-39 MIN: ICD-10-PCS | Mod: HCNC,S$GLB,, | Performed by: INTERNAL MEDICINE

## 2019-05-29 PROCEDURE — 99214 OFFICE O/P EST MOD 30 MIN: CPT | Mod: HCNC,S$GLB,, | Performed by: INTERNAL MEDICINE

## 2019-05-29 PROCEDURE — 99999 PR PBB SHADOW E&M-EST. PATIENT-LVL III: CPT | Mod: PBBFAC,HCNC,, | Performed by: INTERNAL MEDICINE

## 2019-05-29 PROCEDURE — 80053 COMPREHEN METABOLIC PANEL: CPT | Mod: HCNC

## 2019-05-29 PROCEDURE — 85025 COMPLETE CBC W/AUTO DIFF WBC: CPT | Mod: HCNC

## 2019-05-29 PROCEDURE — 82306 VITAMIN D 25 HYDROXY: CPT | Mod: HCNC

## 2019-05-29 PROCEDURE — 1101F PT FALLS ASSESS-DOCD LE1/YR: CPT | Mod: HCNC,CPTII,S$GLB, | Performed by: INTERNAL MEDICINE

## 2019-05-29 PROCEDURE — 99499 RISK ADDL DX/OHS AUDIT: ICD-10-PCS | Mod: HCNC,S$GLB,, | Performed by: INTERNAL MEDICINE

## 2019-05-29 PROCEDURE — 1101F PR PT FALLS ASSESS DOC 0-1 FALLS W/OUT INJ PAST YR: ICD-10-PCS | Mod: HCNC,CPTII,S$GLB, | Performed by: INTERNAL MEDICINE

## 2019-05-29 PROCEDURE — 99499 UNLISTED E&M SERVICE: CPT | Mod: HCNC,S$GLB,, | Performed by: INTERNAL MEDICINE

## 2019-05-29 PROCEDURE — 80061 LIPID PANEL: CPT | Mod: HCNC

## 2019-05-29 PROCEDURE — 99999 PR PBB SHADOW E&M-EST. PATIENT-LVL III: ICD-10-PCS | Mod: PBBFAC,HCNC,, | Performed by: INTERNAL MEDICINE

## 2019-05-29 NOTE — PROGRESS NOTES
Subjective:      Patient ID: Lavell Coyle is a 87 y.o. male.    Chief Complaint: Follow-up    HPI:  HPI   Patient is here for a review:  He has osteoarthritis and is taking aleve if needed and goes to exercise.  He has had a diagnosis of mild cognitive impairment which seems to be stable.  Tells me he is living alone he continues to drive.  He has not gotten lost driving.  He has not had any recent falls.  He has no other new complaints.       Patient Active Problem List   Diagnosis    BPH with urinary obstruction    Thyroid nodule    Age-related osteoporosis with current pathological fracture with routine healing    Skin cancer    History of melanoma excision    Basal cell cancer    MCI (mild cognitive impairment) with memory loss    Hypothyroidism    Medial meniscus tear    S/P R knee surgery, medial/lateral menisectomy, loose body removal    Range of motion deficit    Decreased strength    Nuclear cataract    Ocular migraine    Blepharitis of both eyes    Bilateral dry eyes    Memory loss    Tortuous aorta    Meningiomas, multiple    Closed compression fracture of first lumbar vertebra    Acute left-sided low back pain without sciatica     Past Medical History:   Diagnosis Date    Allergic blepharitis 5/20/2014    Basal cell cancer     History of mononucleosis     pupura    History of osteopenia     Hypothyroidism     Melanoma     Osteopenia     Prostate hypertrophy     Thrombocytopenia 2002    Thrombocytopenia 2002    Thyroid disease     Thyroid nodule 07/2009    left nodule     Past Surgical History:   Procedure Laterality Date    ARTHROSCOPY, KNEE Right 4/23/2014    [facility]    CARPAL TUNNEL RELEASE      left    CHONDRECTOMY, KNEE, SEMILUNAR CARTILAGE Right 4/23/2014    [facility] OR    KNEE ARTHROSCOPY      KNEE SURGERY      Moh      surgery    ROTATOR CUFF REPAIR      right    SKIN CANCER  "EXCISION       Family History   Problem Relation Age of Onset    Cancer Other     Alcohol abuse Father     Prostate cancer Neg Hx     Kidney disease Neg Hx      Review of Systems   Constitutional: Negative for appetite change, chills, fever and unexpected weight change.   Respiratory: Negative for shortness of breath and wheezing.    Cardiovascular: Negative for chest pain, palpitations and leg swelling.   Gastrointestinal: Negative for abdominal pain, blood in stool, diarrhea, nausea and vomiting.     Objective:     Vitals:    05/29/19 1531   BP: 114/60   Pulse: 78   SpO2: 95%   Weight: 70.9 kg (156 lb 4.9 oz)   Height: 5' 8" (1.727 m)   PainSc: 0-No pain     Body mass index is 23.77 kg/m².  Physical Exam   Constitutional: He is oriented to person, place, and time. He appears well-developed and well-nourished. No distress.   Neck: Carotid bruit is not present. No thyromegaly present.   Cardiovascular: Normal rate, regular rhythm and normal heart sounds. PMI is not displaced.   Pulmonary/Chest: Effort normal and breath sounds normal. No respiratory distress.   Abdominal: Soft. Bowel sounds are normal. He exhibits no distension. There is no tenderness.   Musculoskeletal: He exhibits no edema.   Neurological: He is alert and oriented to person, place, and time.     Assessment:     1. Hypothyroidism, unspecified type    2. Meningiomas, multiple    3. Tortuous aorta    4. Skin cancer    5. Osteoarthritis involving multiple joints on both sides of body    6. Hypothyroidism in adult      Plan:   Lavell was seen today for follow-up.    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  He has been diagnosed with MCI which appears to be stable.  He continues to go to the [facility] for memory class on Tuesdays.  His only prescription medication is levothyroxine.  He will require laboratory studies today.  His only complaint is the osteoarthritis but because he continues to " "exercise and take anti-inflammatories as needed he is doing quite well.  He is not on a statin for his tortuous aorta by choice.  An MRI has shown small meningiomas in the past but he is asymptomatic.  I will continue to see him every 6 months and more often if needed.    Hypothyroidism, unspecified type    Meningiomas, multiple    Tortuous aorta    Skin cancer    Osteoarthritis involving multiple joints on both sides of body    Hypothyroidism in adult        Problem List Items Addressed This Visit     Skin cancer    Overview     melanoma and basal cell         Hypothyroidism - Primary    Tortuous aorta    Overview     Location in Record and Date: CXR-11/10/2014  "There is tortuosity of the descending aorta which can be seen with chronic hypertension."     Please document in your clinic note and add to the Problem List if the patient has:     Tortuous Aorta  Other          Meningiomas, multiple    Overview        Location in Record and Date:  Meningiomas  MRI Brain-11/28/2012    "Cranial MRI examination demonstrates a very small foci of extraaxial enhancement, most consistent with tiny meningiomas, arising from the falx in the inferior supraorbital frontal region and parietal region in the midline."  Please document in your clinic note and add to the Problem List if the patient has:     Meningiomas    Other   Clinically Undetermined            Other Visit Diagnoses     Osteoarthritis involving multiple joints on both sides of body        Hypothyroidism in adult            No orders of the defined types were placed in this encounter.    Follow up in about 6 months (around 11/29/2019) for Follow up.     Medication List           Accurate as of 5/29/19  8:59 PM. If you have any questions, ask your nurse or doctor.               CONTINUE taking these medications    calcium citrate-vitamin D3 315-200 mg 315-200 mg-unit per tablet  Commonly known as:  CITRACAL+D     levothyroxine 50 MCG tablet  Commonly known as:  " SYNTHROID  TAKE 1 TABLET BY MOUTH EVERY DAY     MULTIVITAMIN ORAL     OMEGA-3 ORAL     saw palmetto 160 MG capsule        STOP taking these medications    meloxicam 15 MG tablet  Commonly known as:  MOBIC  Stopped by:  Rosalva Howard MD

## 2019-06-03 ENCOUNTER — TELEPHONE (OUTPATIENT)
Dept: INTERNAL MEDICINE | Facility: CLINIC | Age: 84
End: 2019-06-03

## 2019-06-03 NOTE — TELEPHONE ENCOUNTER
----- Message from Cassidy Sam sent at 6/3/2019 12:49 PM CDT -----  Contact: Patient [phone]  Prescription Request:     Name of medication: See Symptoms    Reason for request: Sleeping Pills    Pharmacy: [facility] [address] [place] [address] [address] AT [facility] [address]    Please advise.    Thank You

## 2019-06-03 NOTE — TELEPHONE ENCOUNTER
Pt had a visit with you on 5/29/19 and forgot to discuss getting some sleep medication with you. He would like you to prescribe something and send it to his preferred pharmacy

## 2019-06-14 ENCOUNTER — TELEPHONE (OUTPATIENT)
Dept: INTERNAL MEDICINE | Facility: CLINIC | Age: 84
End: 2019-06-14

## 2019-06-14 NOTE — TELEPHONE ENCOUNTER
Spoke with patient, he would not give any further information regarding the reason for his call. He only wants to speak with Dr Howard.

## 2019-06-14 NOTE — TELEPHONE ENCOUNTER
----- Message from Janay Christine sent at 6/14/2019  4:19 PM CDT -----  Contact: self/[phone]  Patient called in regards needing to talk with Dr Howard about having miscommunication on  prescription - no specific medication. Please call and advise. Thank you

## 2019-06-19 ENCOUNTER — TELEPHONE (OUTPATIENT)
Dept: AUDIOLOGY | Facility: CLINIC | Age: 84
End: 2019-06-19

## 2019-06-21 ENCOUNTER — TELEPHONE (OUTPATIENT)
Dept: INTERNAL MEDICINE | Facility: CLINIC | Age: 84
End: 2019-06-21

## 2019-06-21 NOTE — TELEPHONE ENCOUNTER
----- Message from Aliya Donahue sent at 6/21/2019 11:29 AM CDT -----  Contact: Patient -[phone]  Patient is requesting a recommendation for sleep medication.    Please call and advise  Thank you

## 2019-06-24 ENCOUNTER — HOSPITAL ENCOUNTER (OUTPATIENT)
Dept: RADIOLOGY | Facility: OTHER | Age: 84
Discharge: HOME | End: 2019-06-24
Attending: PHYSICIAN ASSISTANT
Payer: MEDICARE

## 2019-06-24 ENCOUNTER — OFFICE VISIT (OUTPATIENT)
Dept: SPINE | Facility: CLINIC | Age: 84
End: 2019-06-24
Payer: MEDICARE

## 2019-06-24 ENCOUNTER — TELEPHONE (OUTPATIENT)
Dept: INTERNAL MEDICINE | Facility: CLINIC | Age: 84
End: 2019-06-24

## 2019-06-24 VITALS
HEIGHT: 68 IN | HEART RATE: 69 BPM | BODY MASS INDEX: 23.64 KG/M2 | SYSTOLIC BLOOD PRESSURE: 138 MMHG | DIASTOLIC BLOOD PRESSURE: 66 MMHG | WEIGHT: 156 LBS

## 2019-06-24 DIAGNOSIS — M79.18 MYOFASCIAL MUSCLE PAIN: ICD-10-CM

## 2019-06-24 DIAGNOSIS — M43.10 ACQUIRED SPONDYLOLISTHESIS: ICD-10-CM

## 2019-06-24 DIAGNOSIS — F51.01 PRIMARY INSOMNIA: Primary | ICD-10-CM

## 2019-06-24 DIAGNOSIS — M47.816 SPONDYLOSIS OF LUMBAR REGION WITHOUT MYELOPATHY OR RADICULOPATHY: ICD-10-CM

## 2019-06-24 DIAGNOSIS — M54.50 BILATERAL LOW BACK PAIN WITHOUT SCIATICA, UNSPECIFIED CHRONICITY: Primary | ICD-10-CM

## 2019-06-24 DIAGNOSIS — M54.50 BILATERAL LOW BACK PAIN WITHOUT SCIATICA, UNSPECIFIED CHRONICITY: ICD-10-CM

## 2019-06-24 DIAGNOSIS — Z87.81 HISTORY OF SPINAL FRACTURE: ICD-10-CM

## 2019-06-24 PROCEDURE — 1101F PR PT FALLS ASSESS DOC 0-1 FALLS W/OUT INJ PAST YR: ICD-10-PCS | Mod: HCNC,CPTII,S$GLB, | Performed by: PHYSICIAN ASSISTANT

## 2019-06-24 PROCEDURE — 99999 PR PBB SHADOW E&M-EST. PATIENT-LVL IV: ICD-10-PCS | Mod: PBBFAC,HCNC,, | Performed by: PHYSICIAN ASSISTANT

## 2019-06-24 PROCEDURE — 99999 PR PBB SHADOW E&M-EST. PATIENT-LVL IV: CPT | Mod: PBBFAC,HCNC,, | Performed by: PHYSICIAN ASSISTANT

## 2019-06-24 PROCEDURE — 72120 X-RAY BEND ONLY L-S SPINE: CPT | Mod: 26,HCNC,, | Performed by: RADIOLOGY

## 2019-06-24 PROCEDURE — 72100 X-RAY EXAM L-S SPINE 2/3 VWS: CPT | Mod: 26,HCNC,, | Performed by: RADIOLOGY

## 2019-06-24 PROCEDURE — 72120 XR LUMBAR SPINE AP AND LAT WITH FLEX/EXT: ICD-10-PCS | Mod: 26,HCNC,, | Performed by: RADIOLOGY

## 2019-06-24 PROCEDURE — 1101F PT FALLS ASSESS-DOCD LE1/YR: CPT | Mod: HCNC,CPTII,S$GLB, | Performed by: PHYSICIAN ASSISTANT

## 2019-06-24 PROCEDURE — 72100 XR LUMBAR SPINE AP AND LAT WITH FLEX/EXT: ICD-10-PCS | Mod: 26,HCNC,, | Performed by: RADIOLOGY

## 2019-06-24 PROCEDURE — 72100 X-RAY EXAM L-S SPINE 2/3 VWS: CPT | Mod: TC,HCNC,FY

## 2019-06-24 PROCEDURE — 99213 PR OFFICE/OUTPT VISIT, EST, LEVL III, 20-29 MIN: ICD-10-PCS | Mod: HCNC,S$GLB,, | Performed by: PHYSICIAN ASSISTANT

## 2019-06-24 PROCEDURE — 99213 OFFICE O/P EST LOW 20 MIN: CPT | Mod: HCNC,S$GLB,, | Performed by: PHYSICIAN ASSISTANT

## 2019-06-24 RX ORDER — RAMELTEON 8 MG/1
8 TABLET ORAL NIGHTLY
Qty: 7 TABLET | Refills: 0 | Status: SHIPPED | OUTPATIENT
Start: 2019-06-24 | End: 2019-07-03 | Stop reason: SDUPTHER

## 2019-06-24 NOTE — PATIENT INSTRUCTIONS
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 I am sorry that you are hurting so much.     You have diffuse arthritis in your back and your increased pain is likely due to a flare up of this arthritis. I want to get some new xrays of your back to be sure that you do not have a new fracture. We will call you with these results.     As long as you do not have a new fracture then I agree with restarting the [facility] physical therapy program. I also recommend taking over the counter ibuprofen- you can take 200-400mg up to 3 times a day. Take this with food.     I want to see you back in 6 weeks, but call me if you need anything. I also sent Dr. Howard a note about your benadryl. You should hear back from her staff.     Adrianne   [phone]

## 2019-06-24 NOTE — TELEPHONE ENCOUNTER
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     He has been taking benadryl to sleep for 20 + years. He started at 50mg at night and is now up to 125mg at night to sleep and then he wakes up and typically takes another 25mg.     He wants to know if there is another medication he can use instead of increasing his benadryl dose.     Please call and let him know. Call him at [phone].     Thank you!    Adrianne

## 2019-06-24 NOTE — PROGRESS NOTES
"Subjective:      Patient ID: Lavell Coyle is a 87 y.o. male.    Chief Complaint: Follow-up      HPI  (Celestre)    History of basal cell CA, BPH, hypothyroidism, memory loss, and osteopenia. Known spondylosis T11-T12, slight retrolisthesis L2-L3, slight slip L4-L5, facet hypertrophy L4-S1. Pain multifactorial and likely with myofascial with component of left SI/psoas versus facet pain. MRI from 3/29/18 showed chronic L1 compression fracture with left paracentral disc L2-L3 causing moderate central stenosis, and multilevel foraminal stenosis.     He continued with generalized intermittent LBP, but woke up this morning with more localized central LBP with no leg pain. No known injury. Pain is worse with sitting and better when he is up and moving. He rates his pain as an 8 on a scale of 1-10. Pain is constant and "specific." This feels different then his previous generalized pain.     He has been taken prn OTC motrin and thinks it helps. Per my last note, medrol dose pack did not help. He had improvement with [facility]. No injections or lumbar surgery.       Review of Systems   Constitution: Negative for chills, fever, night sweats and weight gain.   Gastrointestinal: Negative for bowel incontinence, nausea and vomiting.   Genitourinary: Negative for bladder incontinence.   Neurological: Negative for disturbances in coordination and loss of balance.           Objective:        General: Lavell is well-developed, well-nourished, appears stated age, in no acute distress, alert and oriented to time, place and person. He looks much younger than his stated age.     Ortho/SPM Exam     Patient sits comfortably in the exam room and answers questions appropriately. Grossly patient is able to move bilateral LEs without difficulty. Ambulates normally.     On exam of the lumbar spine, Inspection of back is normal, minimal central lower lumbar tenderness. No pain with fist percussion.     muscle tone normal without " spasm, limited range of motion with pain  Pain in flexion. and Pain in extension.    Strength testing of the bilateral LEs shows  Right hip abduction:  +5/5  Left hip abduction:  +5/5  Right hip flexion:  +5/5  Left hip flexion:  +5/5  Right hip extensors:  +5/5  Left hip extensors:  +5/5  Right quadriceps:  +5/5  Left quadriceps:  +5/5  Right hamstring:  +5/5  Left hamstring:  +5/5  Right dorsiflexion:  +5/5  Left dorsiflexion:  +5/5  Right plantar flexion:  +5/5  Left plantar flexion:  +5/5   Right EHL:  +5/5   Left EHL:  +5/5    negative straight leg raise on bilateral LEs.     Sensation is grossly intact in L2, L3, L4, L5, and S1 distribution.    Right hip has no pain with IR/ER. Left hip has no pain with IR/ER.           Assessment:       1. Bilateral low back pain without sciatica, unspecified chronicity    2. Acquired spondylolisthesis    3. Spondylosis of lumbar region without myelopathy or radiculopathy    4. Myofascial muscle pain    5. History of spinal fracture           Plan:       Orders Placed This Encounter    X-Ray Lumbar Spine Ap Lateral w/Flex Ext    Ambulatory consult to [facility]       Chronic generalized intermittent LBP, but woke up this morning with more localized central LBP with no leg pain. No known injury.  Known spondylosis T11-T12, slight retrolisthesis L2-L3, slight slip L4-L5, facet hypertrophy L4-S1. MRI from 3/29/18 showed chronic L1 compression fracture with left paracentral disc L2-L3 causing moderate central stenosis, and multilevel foraminal stenosis. Pain likely due to flare up of underlying spondylosis, however cannot rule out acute compression fracture. Treatment options reviewed with patient and following plan made:     - Lumbar XRs on his way out. Will call with results [phone].   - If no acute fracture, then restart [facility] program.   - Continue prn OTC motrin. Can do 200-400mg up to tid with food. Reviewed dosing and side effects.     Follow-up:  Follow up in about 6 weeks (around 8/5/2019). If there are any questions prior to this, the patient was instructed to contact the office.

## 2019-06-25 ENCOUNTER — TELEPHONE (OUTPATIENT)
Dept: INTERNAL MEDICINE | Facility: CLINIC | Age: 84
End: 2019-06-25

## 2019-06-25 ENCOUNTER — TELEPHONE (OUTPATIENT)
Dept: SPINE | Facility: CLINIC | Age: 84
End: 2019-06-25

## 2019-06-25 DIAGNOSIS — M53.9 BACK DISORDER: Primary | ICD-10-CM

## 2019-06-25 NOTE — TELEPHONE ENCOUNTER
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 Okay to start Healthy Back program. Let me know if he has any questions, I can always call him back.     Thanks.

## 2019-06-25 NOTE — TELEPHONE ENCOUNTER
----- Message from Anna Fowler sent at 6/25/2019  2:20 PM CDT -----  Contact: Patient/ [phone]   Pt states that he is calling to speak with Anita Rodriguez in regards to getting a referral to a back specialist. Please advise.      Thanks

## 2019-06-25 NOTE — TELEPHONE ENCOUNTER
Attempted to contact pt to inform of result note per WOJCIECH Santana.  No answer from pt, message left on voicemail with office phone number for patient to return call to office.

## 2019-06-26 ENCOUNTER — OFFICE VISIT (OUTPATIENT)
Dept: INTERNAL MEDICINE | Facility: CLINIC | Age: 84
End: 2019-06-26
Payer: MEDICARE

## 2019-06-26 VITALS
OXYGEN SATURATION: 98 % | SYSTOLIC BLOOD PRESSURE: 112 MMHG | DIASTOLIC BLOOD PRESSURE: 60 MMHG | HEIGHT: 68 IN | WEIGHT: 155.63 LBS | BODY MASS INDEX: 23.59 KG/M2 | HEART RATE: 67 BPM

## 2019-06-26 DIAGNOSIS — M54.50 BILATERAL LOW BACK PAIN WITHOUT SCIATICA, UNSPECIFIED CHRONICITY: Primary | ICD-10-CM

## 2019-06-26 DIAGNOSIS — G31.84 MCI (MILD COGNITIVE IMPAIRMENT) WITH MEMORY LOSS: ICD-10-CM

## 2019-06-26 PROBLEM — Z87.81 HISTORY OF VERTEBRAL COMPRESSION FRACTURE: Status: ACTIVE | Noted: 2018-04-09

## 2019-06-26 PROCEDURE — 99999 PR PBB SHADOW E&M-EST. PATIENT-LVL III: ICD-10-PCS | Mod: PBBFAC,HCNC,, | Performed by: PHYSICIAN ASSISTANT

## 2019-06-26 PROCEDURE — 99213 PR OFFICE/OUTPT VISIT, EST, LEVL III, 20-29 MIN: ICD-10-PCS | Mod: HCNC,S$GLB,, | Performed by: PHYSICIAN ASSISTANT

## 2019-06-26 PROCEDURE — 99213 OFFICE O/P EST LOW 20 MIN: CPT | Mod: HCNC,S$GLB,, | Performed by: PHYSICIAN ASSISTANT

## 2019-06-26 PROCEDURE — 1101F PT FALLS ASSESS-DOCD LE1/YR: CPT | Mod: HCNC,CPTII,S$GLB, | Performed by: PHYSICIAN ASSISTANT

## 2019-06-26 PROCEDURE — 99999 PR PBB SHADOW E&M-EST. PATIENT-LVL III: CPT | Mod: PBBFAC,HCNC,, | Performed by: PHYSICIAN ASSISTANT

## 2019-06-26 PROCEDURE — 1101F PR PT FALLS ASSESS DOC 0-1 FALLS W/OUT INJ PAST YR: ICD-10-PCS | Mod: HCNC,CPTII,S$GLB, | Performed by: PHYSICIAN ASSISTANT

## 2019-06-26 NOTE — PATIENT INSTRUCTIONS
TRY TOPICAL ASPERCREME/LIDOCAINE PATCH    KEEP UPCOMING APPOINTMENT WITH [facility] AND SPINE CLINIC FOLLOW UP.

## 2019-06-26 NOTE — PROGRESS NOTES
"Subjective:       Patient ID: Lavell Coyle is a 87 y.o. male.    Chief Complaint: Lumbar Spine Pain (L-Spine) (Started this morning )    HPI     Established pt of Rosalva Howard MD (new to me)      C/o low back pian, chronic in nature but recent flare this AM. He denies any injury, no recent strenuous activity. Pt unable to characterized pain, described its as "just pain". Denies LE weakness, no n/t of extremities, no bowel/bladder incontinence or saddle anesthesia. Pain is better with walking, worse with prolonged sitting.     Pt seen in spine and back clinic 2 days ago. Had xrays obtained, see below. Referred to healthy back program which patient plans to start in a few weeks.     Review of patient's allergies indicates:  No Known Allergies    Past Medical History:   Diagnosis Date    Allergic blepharitis 5/20/2014    Basal cell cancer     History of mononucleosis     pupura    History of osteopenia     Hypothyroidism     Melanoma     Osteopenia     Prostate hypertrophy     Thrombocytopenia 2002    Thrombocytopenia 2002    Thyroid disease     Thyroid nodule 07/2009    left nodule     Social History     Tobacco Use    Smoking status: Never Smoker    Smokeless tobacco: Never Used   Substance Use Topics    Alcohol use: Yes     Alcohol/week: 0.6 oz     Types: 1 Glasses of wine per week     Comment: ONCE DAILY    Drug use: No         Review of Systems   Constitutional: Negative for chills, fever and unexpected weight change.   Eyes: Negative for visual disturbance.   Respiratory: Negative for cough and shortness of breath.    Cardiovascular: Negative for chest pain and leg swelling.   Gastrointestinal: Negative for abdominal pain, nausea and vomiting.   Musculoskeletal: Positive for back pain.   Skin: Negative for rash.   Neurological: Negative for weakness, light-headedness and headaches.       Objective: /60   Pulse 67   Ht 5' 8" (1.727 m)   Wt 70.6 kg (155 lb 10.3 oz)   SpO2 98%   BMI " 23.67 kg/m²         Physical Exam   Constitutional: He appears well-developed and well-nourished. No distress.   HENT:   Head: Normocephalic and atraumatic.   Mouth/Throat: Oropharynx is clear and moist.   Cardiovascular: Normal rate and regular rhythm. Exam reveals no friction rub.   No murmur heard.  Pulmonary/Chest: Effort normal and breath sounds normal. He has no wheezes. He has no rales.   Musculoskeletal: He exhibits no edema.   Negative SLR b/l  Ambulating without difficulty  5/5 Strength to BLE  Some mild discomfort with lateral bending  No pain with flexion or extension   Neurological: He is alert.   Skin: Skin is warm and dry. Capillary refill takes less than 2 seconds. No rash noted.   Psychiatric: He has a normal mood and affect.   Vitals reviewed.          X-Ray Lumbar Spine Ap Lateral w/Flex Ext  Narrative: EXAMINATION:  XR LUMBAR SPINE AP AND LAT WITH FLEX/EXT    CLINICAL HISTORY:  Low back pain    TECHNIQUE:  AP and lateral views as well as lateral flexion and extension images are performed through the lumbar spine.    COMPARISON:  Lumbar spine radiographs 18022 MRI lumbar spine 03/29/2018    FINDINGS:  Mild retrolisthesis of L2 on L3 and grade 1 anterolisthesis of L4 on L5, unchanged.  No movement on flexion or extension.  Mild vertebral body height loss of the L1 vertebral body, unchanged.  Multilevel intervertebral disc space height loss and degenerative endplate changes.  Multilevel facet arthrosis, worse at L4-L5 and L5-S1.  No aggressive osseous abnormality.  No acute, displaced fracture.  Atherosclerotic calcification of the abdominal aorta.  Impression: Multilevel lumbar spondylosis, similar to prior exams.  Mild retrolisthesis of L2 on L3 and grade 1 anterolisthesis of L4 on L5, unchanged.    Electronically signed by: Swetha Balderas  Date:    06/24/2019  Time:    15:53      Assessment:       1. Bilateral low back pain without sciatica, unspecified chronicity    2. MCI (mild cognitive  impairment) with memory loss        Plan:         Lavell was seen today for lumbar spine pain (l-spine).    Diagnoses and all orders for this visit:    Bilateral low back pain without sciatica, unspecified chronicity  Discussed that pain likely flare of his chronic degenerative spine. Conservative measures discussed. May try OTC Aspercreme/lidocaine patch. OTC Motrin PRN  Keep appt with Spine clinic and Therapy.     Future Appointments   Date Time Provider Department Center   7/8/2019  2:00 PM BELÉN Pat[facility] [address]   7/16/2019  2:00 PM Carlos Vizcarra, PT [facility]   8/14/2019  2:30 PM Adrianne Hernandez PA-C [facility]     Blanca Medrano PA-C

## 2019-06-26 NOTE — TELEPHONE ENCOUNTER
----- Message from Janay Christine sent at 6/26/2019  3:24 PM CDT -----  Contact: self/[phone] or [phone]  Patient called in regards needing to inform Dr Howard that he is in pain and that PCP knows about it. please call him back. Thank you.

## 2019-06-27 ENCOUNTER — TELEPHONE (OUTPATIENT)
Dept: INTERNAL MEDICINE | Facility: CLINIC | Age: 84
End: 2019-06-27

## 2019-06-27 ENCOUNTER — TELEPHONE (OUTPATIENT)
Dept: SPINE | Facility: CLINIC | Age: 84
End: 2019-06-27

## 2019-06-27 NOTE — TELEPHONE ENCOUNTER
Pt called in regards to increasing back pain , would like to be seen sooner by a physician , called back and spine clinic was advised that message will be sent to nurse to see if pt can be fit in sooner with an appt with a physician.called pt back left v/m advising pt will be called from that dept. Advising on a sooner appt.

## 2019-06-27 NOTE — TELEPHONE ENCOUNTER
----- Message from Mary Belcher LPN sent at 6/27/2019  3:24 PM CDT -----  I put him on July 8th at 11:15 at [facility] if you can just let him know.    Thanks!!  April  ----- Message -----  From: Karen Antunez MA  Sent: 6/27/2019   3:21 PM  To: Mary Belcher LPN    Or before if you have anything.  ----- Message -----  From: Mary Belcher LPN  Sent: 6/27/2019   3:17 PM  To: Karen Antunez MA    So it needs to be in 6 weeks?    April  ----- Message -----  From: Karen Antunez MA  Sent: 6/27/2019   3:10 PM  To: Mary Belcher LPN    Hey April  This patient would like to see an MD rather than a PA.  Please assist with scheduling him with Dr Esqueda.  Thanks

## 2019-06-27 NOTE — TELEPHONE ENCOUNTER
Please confirm to the patient that an appt has been made with the spine surgeon Dr. Esqueda on July 8th.    Please tell him I tried to call him back at 4:42 and left a message on his answering machine. [facility]

## 2019-06-27 NOTE — TELEPHONE ENCOUNTER
----- Message from Anna Fowler sent at 6/27/2019  4:25 PM CDT -----  Contact: Patient  [phone]  Pt states that he is calling to speak with  in regards to a referral for his back. He states that this is not the same as the referral for back and spine. Please call back and advise.      Thanks

## 2019-06-27 NOTE — TELEPHONE ENCOUNTER
----- Message from Nury Morel sent at 6/27/2019  3:12 PM CDT -----  Contact: self/[phone]  Pt needs a referral to a physician for his back. Please call and advise.          Thank You

## 2019-06-27 NOTE — TELEPHONE ENCOUNTER
----- Message from Anna Fowler sent at 6/27/2019  4:25 PM CDT -----  Contact: Patient  [phone]  Pt states that he is calling to speak with  in regards to a referral for his back. He states that this is not the same as the referral for back and spine. Please call back and advise.      Thanks

## 2019-06-27 NOTE — TELEPHONE ENCOUNTER
----- Message from Mohini Macdonald sent at 6/27/2019  1:32 PM CDT -----  Contact: Pt  Name of Who is Calling: LILLY US [[id number]]    What is the request in detail: Pt is requesting a call back regarding a earlier appt pt sytates he can't wait until Aug. To be seen and doesn't want to see Adrianne.... Please contact to further discuss and advise      Can the clinic reply by [facility]:     What Number to Call Back [facility]:  [phone]

## 2019-06-28 ENCOUNTER — OFFICE VISIT (OUTPATIENT)
Dept: SPINE | Facility: CLINIC | Age: 84
End: 2019-06-28
Payer: MEDICARE

## 2019-06-28 VITALS
DIASTOLIC BLOOD PRESSURE: 64 MMHG | SYSTOLIC BLOOD PRESSURE: 128 MMHG | BODY MASS INDEX: 23.49 KG/M2 | HEART RATE: 72 BPM | HEIGHT: 68 IN | WEIGHT: 155 LBS

## 2019-06-28 DIAGNOSIS — Z87.81 HISTORY OF COMPRESSION FRACTURE OF SPINE: ICD-10-CM

## 2019-06-28 DIAGNOSIS — M47.816 FACET HYPERTROPHY OF LUMBAR REGION: ICD-10-CM

## 2019-06-28 DIAGNOSIS — M51.36 DDD (DEGENERATIVE DISC DISEASE), LUMBAR: ICD-10-CM

## 2019-06-28 DIAGNOSIS — M43.10 ACQUIRED SPONDYLOLISTHESIS: ICD-10-CM

## 2019-06-28 DIAGNOSIS — M54.50 BILATERAL LOW BACK PAIN WITHOUT SCIATICA, UNSPECIFIED CHRONICITY: Primary | ICD-10-CM

## 2019-06-28 DIAGNOSIS — M47.816 SPONDYLOSIS OF LUMBAR REGION WITHOUT MYELOPATHY OR RADICULOPATHY: ICD-10-CM

## 2019-06-28 PROCEDURE — 99999 PR PBB SHADOW E&M-EST. PATIENT-LVL IV: CPT | Mod: PBBFAC,HCNC,, | Performed by: PHYSICIAN ASSISTANT

## 2019-06-28 PROCEDURE — 99999 PR PBB SHADOW E&M-EST. PATIENT-LVL IV: ICD-10-PCS | Mod: PBBFAC,HCNC,, | Performed by: PHYSICIAN ASSISTANT

## 2019-06-28 PROCEDURE — 99213 PR OFFICE/OUTPT VISIT, EST, LEVL III, 20-29 MIN: ICD-10-PCS | Mod: HCNC,S$GLB,, | Performed by: PHYSICIAN ASSISTANT

## 2019-06-28 PROCEDURE — 99213 OFFICE O/P EST LOW 20 MIN: CPT | Mod: HCNC,S$GLB,, | Performed by: PHYSICIAN ASSISTANT

## 2019-06-28 PROCEDURE — 1101F PT FALLS ASSESS-DOCD LE1/YR: CPT | Mod: HCNC,CPTII,S$GLB, | Performed by: PHYSICIAN ASSISTANT

## 2019-06-28 PROCEDURE — 1101F PR PT FALLS ASSESS DOC 0-1 FALLS W/OUT INJ PAST YR: ICD-10-PCS | Mod: HCNC,CPTII,S$GLB, | Performed by: PHYSICIAN ASSISTANT

## 2019-06-28 RX ORDER — TRAMADOL HYDROCHLORIDE 50 MG/1
50 TABLET ORAL EVERY 8 HOURS PRN
Qty: 21 TABLET | Refills: 0 | Status: SHIPPED | OUTPATIENT
Start: 2019-06-28 | End: 2019-07-05

## 2019-06-28 RX ORDER — IBUPROFEN 200 MG
200 TABLET ORAL EVERY 6 HOURS PRN
COMMUNITY

## 2019-06-28 NOTE — PROGRESS NOTES
Subjective:      Patient ID: Lavell Coyle is a 87 y.o. male.    Chief Complaint: Low-back Pain      Low-back Pain   Pertinent negatives include no bladder incontinence, bowel incontinence or fever.     (Celestre)    History of basal cell CA, BPH, hypothyroidism, memory loss, and osteopenia. Known spondylosis T11-T12, slight retrolisthesis L2-L3, slight slip L4-L5, facet hypertrophy L4-S1. Pain multifactorial and likely with myofascial with component of left SI/psoas versus facet pain. MRI from 3/29/18 showed chronic L1 compression fracture with left paracentral disc L2-L3 causing moderate central stenosis, and multilevel foraminal stenosis.     Seen on Monday with more centralized LBP with no leg pain. No acute fractures seen on XRs. Pain is not any better and he wants to know exactly what is causing his pain. Pian is worse with standing and better with he sits/lays flat. He rates his pain as an 8 on a scale of 1-10. Pain is constant and more localized then previous pain. Minimal relief with motrin. Per my previous note, medrol dose pack did not help. He had improvement with [facility]. No injections or lumbar surgery.       Review of Systems   Constitution: Negative for chills, fever, night sweats and weight gain.   Gastrointestinal: Negative for bowel incontinence, nausea and vomiting.   Genitourinary: Negative for bladder incontinence.   Neurological: Negative for disturbances in coordination and loss of balance.           Objective:        General: Lavell is well-developed, well-nourished, appears stated age, in no acute distress, alert and oriented to time, place and person. He looks much younger than his stated age.     Ortho/SPM Exam     Patient sits comfortably in the exam room and answers questions appropriately. Grossly patient is able to move bilateral LEs without difficulty. Ambulates normally.          Assessment:       1. Bilateral low back pain without sciatica, unspecified chronicity    2.  Acquired spondylolisthesis    3. DDD (degenerative disc disease), lumbar    4. Facet hypertrophy of lumbar region    5. History of compression fracture of spine    6. Spondylosis of lumbar region without myelopathy or radiculopathy           Plan:       Orders Placed This Encounter    traMADol (ULTRAM) 50 mg tablet       He has persistent localized central LBP with no leg pain. No known injury.  Known spondylosis T11-T12, slight retrolisthesis L2-L3, slight slip L4-L5, facet hypertrophy L4-S1. MRI from 3/29/18 showed chronic L1 compression fracture with left paracentral disc L2-L3 causing moderate central stenosis, and multilevel foraminal stenosis. No acute compression fractures seen on XRs from Monday. Pain likely due to flare up of underlying spondylosis. May also be due to facets/DDD. Treatment options reviewed with patient and following plan made:     - Limited prescription for ultram. Reviewed dosing and side effects.  reviewed and is appropriate.   - At his request, will review his imaging with Dr. Esqueda on Monday and call him with recommendations.   - Keep appointment with [facility] for now.     ADDENDUM 7/1/19:  Patient reviewed with Dr. Esqueda- he thinks his pain is coming from lumbar disc herniation at L2-L3. He recommends bilateral L2 TF MCKAYLA with pain management. Patient will be called and advised. If he wants to proceed with injection, then I would order it and push back his appointment with Yin to after the MCKAYLA.     Follow-up: Follow up if symptoms worsen or fail to improve. If there are any questions prior to this, the patient was instructed to contact the office.

## 2019-06-28 NOTE — LETTER
June 28, 2019      Rosalva Howard MD  [address]  [facility] [address]           [address]  [address], [address]  [facility] [address]  Phone: [phone]  Fax: [phone]          Patient: Lavell Coyle   MR Number: [id number]   YOB: 1932   Date of Visit: 6/28/2019       Dear Dr. Rosalva Howard:    Thank you for referring Lavell Coyle to me for evaluation. Attached you will find relevant portions of my assessment and plan of care.    If you have questions, please do not hesitate to call me. I look forward to following Lavell Coyle along with you.    Sincerely,    APOLINAR Colmenares  CC:  No Recipients    If you would like to receive this communication electronically, please contact [email or web address] or [phone] to request more information on [employer] Link access.    For providers and/or their staff who would like to refer a patient to [facility], please contact us through our one-stop-shop provider referral line, [facility], at [phone].    If you feel you have received this communication in error or would no longer like to receive these types of communications, please e-mail [email or web address]

## 2019-06-28 NOTE — PATIENT INSTRUCTIONS
It was good to see you again today.     You have diffuse arthritis in your back and your increased pain is likely due to a flare up of this arthritis. Your xrays from Monday did not show a new compression fracture.     I gave you a prescription for tramadol to use for severe pain. Be careful, this can make you sleepy.     I will review your imaging with Dr. Esqueda on Monday and we will call you with further recommendations.     Adrianne

## 2019-07-01 ENCOUNTER — TELEPHONE (OUTPATIENT)
Dept: INTERNAL MEDICINE | Facility: CLINIC | Age: 84
End: 2019-07-01

## 2019-07-01 NOTE — TELEPHONE ENCOUNTER
Spoke with pt. Informed pt he does have an appt on July 8th. Pt stated he is on a lot of pain. Informed pt he is also on the wait list for a sooner appt. Spoke with orthopdedic dept and pt appt will be added as a high priority, if dept has any cancellations they will call pt.

## 2019-07-01 NOTE — TELEPHONE ENCOUNTER
Please make sure the patient is aware that he has an appt with the Orthopedic Spine Surgeon Monday 7/8/2019.: Dr. Dominick Esqueda. Please make sure that you speak directly to him and he understands where to go.

## 2019-07-01 NOTE — TELEPHONE ENCOUNTER
"Last Written Prescription Date:  1/09/18  Last Fill Quantity: 9 tablet,  # refills: 15   Last office visit: 12/21/2018 with prescribing provider:  Christina   Future Office Visit:      Requested Prescriptions   Pending Prescriptions Disp Refills     SUMAtriptan (IMITREX) 100 MG tablet [Pharmacy Med Name: SUMATRIPTAN 100MG TABLETS] 9 tablet 0     Sig: TAKE ONE TABLET BY MOUTH AT ONSET OF HEADACHE AS DIRECTED    Serotonin Agonists Failed - 1/10/2019  1:46 PM       Failed - Blood pressure under 140/90 in past 12 months    BP Readings from Last 3 Encounters:   12/21/18 (!) 146/94   12/05/18 (!) 174/98   01/19/18 (!) 166/93                Failed - Serotonin Agonist request needs review.    Please review patient's record. If patient has had 8 or more treatments in the past month, please forward to provider.         Passed - Recent (12 mo) or future (30 days) visit within the authorizing provider's specialty    Patient had office visit in the last 12 months or has a visit in the next 30 days with authorizing provider or within the authorizing provider's specialty.  See \"Patient Info\" tab in inbasket, or \"Choose Columns\" in Meds & Orders section of the refill encounter.             Passed - Medication is active on med list       Passed - Patient is age 18 or older       Passed - No active pregnancy on record       Passed - No positive pregnancy test in past 12 months          " ----- Message from Janay Christine sent at 7/1/2019  1:37 PM CDT -----  Contact: self/[phone]  Patient would like to get a referral.  Referral to what specialty:  Orthopaedic  Does the patient want the referral with a specific physician:    Is the specialist an [facility]:    Reason (be specific):  Severe back pain  Does the patient already have the specialty clinic appointment scheduled:    If yes, what date is the appointment scheduled: no    Is the insurance listed in Epic correct? (this is important for a referral):    Comments:      ##Advise the patient that once the physician approves this either a nurse or the  will return their call##

## 2019-07-02 ENCOUNTER — TELEPHONE (OUTPATIENT)
Dept: INTERNAL MEDICINE | Facility: CLINIC | Age: 84
End: 2019-07-02

## 2019-07-02 ENCOUNTER — TELEPHONE (OUTPATIENT)
Dept: ORTHOPEDICS | Facility: CLINIC | Age: 84
End: 2019-07-02

## 2019-07-02 ENCOUNTER — TELEPHONE (OUTPATIENT)
Dept: SPINE | Facility: CLINIC | Age: 84
End: 2019-07-02

## 2019-07-02 RX ORDER — CELECOXIB 200 MG/1
200 CAPSULE ORAL 2 TIMES DAILY
Qty: 14 CAPSULE | Refills: 0 | Status: SHIPPED | OUTPATIENT
Start: 2019-07-02 | End: 2019-07-06 | Stop reason: SDUPTHER

## 2019-07-02 NOTE — TELEPHONE ENCOUNTER
----- Message from Adrianne Hernandez PA-C sent at 7/1/2019 10:31 PM CDT -----  Please call and let him know that Dr. Esqueda reviewed his imaging. Dr. Esqueda thinks the disc herniation at L2-L3 is the main etiology/cause of his back pain. He recommends an epidural injection in this area with pain management.     If he wants to proceed with this, I can order. He can keep appointment with Yin on Monday or push it back to after the injection.     Let me know if he has more questions.     Thanks.

## 2019-07-02 NOTE — TELEPHONE ENCOUNTER
----- Message from Graciela Robison sent at 7/2/2019  1:45 PM CDT -----  Contact: [phone]  Patient is returning a phone call.  Who left a message for the patient: Anita  Does patient know what this is regarding:    Comments: please advise, thanks

## 2019-07-02 NOTE — TELEPHONE ENCOUNTER
----- Message from Sarina Carr sent at 7/2/2019 12:11 PM CDT -----  Contact: self   Pt is needing a call back regarding being seen sooner pt can be reached @[phone]

## 2019-07-02 NOTE — TELEPHONE ENCOUNTER
Called patient multiple times left message on answering machine. Could we get a defined time I could speak with him.  Dr. Howard

## 2019-07-02 NOTE — TELEPHONE ENCOUNTER
Spoke with pt. Pt stated his phone is not working properly to call on the land line. Advised pt I will be forwarding this information to provider to contact him back

## 2019-07-02 NOTE — TELEPHONE ENCOUNTER
Please advise   ----- Message from Naila Fowler sent at 7/2/2019 12:27 PM CDT -----  Contact: self/ [phone]  Patient would like to get medical advice.  Symptoms (please be specific):  Severe lower back pain  How long has patient had these symptoms:    Pharmacy name and phone # (copy/paste from chart):    Any drug allergies (copy/paste from chart):      Would the patient rather a call back or a response via [facility]?:    Comments:

## 2019-07-03 DIAGNOSIS — F51.01 PRIMARY INSOMNIA: ICD-10-CM

## 2019-07-03 RX ORDER — RAMELTEON 8 MG/1
TABLET, FILM COATED ORAL
Qty: 7 TABLET | Refills: 0 | Status: SHIPPED | OUTPATIENT
Start: 2019-07-03 | End: 2019-10-07

## 2019-07-06 ENCOUNTER — OFFICE VISIT (OUTPATIENT)
Dept: INTERNAL MEDICINE | Facility: CLINIC | Age: 84
End: 2019-07-06
Payer: MEDICARE

## 2019-07-06 VITALS
WEIGHT: 156.75 LBS | HEIGHT: 68 IN | DIASTOLIC BLOOD PRESSURE: 80 MMHG | HEART RATE: 72 BPM | BODY MASS INDEX: 23.76 KG/M2 | OXYGEN SATURATION: 96 % | TEMPERATURE: 98 F | SYSTOLIC BLOOD PRESSURE: 126 MMHG

## 2019-07-06 DIAGNOSIS — M51.26 LUMBAR DISC HERNIATION: Primary | ICD-10-CM

## 2019-07-06 PROCEDURE — 1101F PR PT FALLS ASSESS DOC 0-1 FALLS W/OUT INJ PAST YR: ICD-10-PCS | Mod: HCNC,CPTII,S$GLB, | Performed by: INTERNAL MEDICINE

## 2019-07-06 PROCEDURE — 1101F PT FALLS ASSESS-DOCD LE1/YR: CPT | Mod: HCNC,CPTII,S$GLB, | Performed by: INTERNAL MEDICINE

## 2019-07-06 PROCEDURE — 99999 PR PBB SHADOW E&M-EST. PATIENT-LVL IV: CPT | Mod: PBBFAC,HCNC,, | Performed by: INTERNAL MEDICINE

## 2019-07-06 PROCEDURE — 99213 OFFICE O/P EST LOW 20 MIN: CPT | Mod: HCNC,S$GLB,, | Performed by: INTERNAL MEDICINE

## 2019-07-06 PROCEDURE — 99213 PR OFFICE/OUTPT VISIT, EST, LEVL III, 20-29 MIN: ICD-10-PCS | Mod: HCNC,S$GLB,, | Performed by: INTERNAL MEDICINE

## 2019-07-06 PROCEDURE — 99999 PR PBB SHADOW E&M-EST. PATIENT-LVL IV: ICD-10-PCS | Mod: PBBFAC,HCNC,, | Performed by: INTERNAL MEDICINE

## 2019-07-06 RX ORDER — CELECOXIB 200 MG/1
CAPSULE ORAL
Qty: 14 CAPSULE | Refills: 0 | Status: SHIPPED | OUTPATIENT
Start: 2019-07-06 | End: 2019-10-07

## 2019-07-06 NOTE — PROGRESS NOTES
Subjective:      Patient ID: Lavell Coyle is a 87 y.o. male.    Chief Complaint: Back Pain (x 6 months )    HPI:  HPI   We had been  exchanging phone calls. Patient  wanted to come in and discuss next steps with me. He is aware he has an appt on Monday with  Dr. Esqueda. He has cognitive decline and many of the question are repeated .  Patient Active Problem List   Diagnosis    BPH with urinary obstruction    Thyroid nodule    Age-related osteoporosis with current pathological fracture with routine healing    Skin cancer    History of melanoma excision    Basal cell cancer    MCI (mild cognitive impairment) with memory loss    Hypothyroidism    Medial meniscus tear    S/P R knee surgery, medial/lateral menisectomy, loose body removal    Range of motion deficit    Decreased strength    Nuclear cataract    Ocular migraine    Blepharitis of both eyes    Bilateral dry eyes    Memory loss    Tortuous aorta    Meningiomas, multiple    History of vertebral compression fracture    Acute left-sided low back pain without sciatica     Past Medical History:   Diagnosis Date    Allergic blepharitis 5/20/2014    Basal cell cancer     History of mononucleosis     pupura    History of osteopenia     Hypothyroidism     Melanoma     Osteopenia     Prostate hypertrophy     Thrombocytopenia 2002    Thrombocytopenia 2002    Thyroid disease     Thyroid nodule 07/2009    left nodule     Past Surgical History:   Procedure Laterality Date    ARTHROSCOPY, KNEE Right 4/23/2014    [facility] at [facility]    CARPAL TUNNEL RELEASE      left    CHONDRECTOMY, KNEE, SEMILUNAR CARTILAGE Right 4/23/2014    [facility] at [facility] OR    KNEE ARTHROSCOPY      KNEE SURGERY      Moh      surgery    ROTATOR CUFF REPAIR      right    SKIN CANCER EXCISION       Family History   Problem Relation Age of Onset    Cancer Other     Alcohol abuse Father     Prostate cancer  "Neg Hx     Kidney disease Neg Hx      Review of Systems   Constitutional: Negative for activity change, chills, fever and unexpected weight change.   Respiratory: Negative for cough, chest tightness, shortness of breath and wheezing.    Cardiovascular: Negative for chest pain, palpitations and leg swelling.   Musculoskeletal:        Pain across the lower back with no radicular pain   Patient notes he has no difficulty while sitting but only while standing.  Objective:     Vitals:    07/06/19 1128   BP: 126/80   Pulse: 72   Temp: 97.6 °F (36.4 °C)   TempSrc: Oral   SpO2: 96%   Weight: 71.1 kg (156 lb 12 oz)   Height: 5' 8" (1.727 m)   PainSc:   8   PainLoc: Back     Body mass index is 23.83 kg/m².  Physical Exam   Constitutional: He appears well-developed and well-nourished. No distress.   Neck: Carotid bruit is not present. No thyromegaly present.   Cardiovascular: Normal rate, regular rhythm and normal heart sounds.   Pulmonary/Chest: Effort normal and breath sounds normal. No respiratory distress.   Musculoskeletal: He exhibits no edema.     Assessment:     1. Lumbar disc herniation      Plan:   Lavell was seen today for back pain.    Diagnoses and all orders for this visit:    Lumbar disc herniation    Reviewed the notes of back and spine clinic and the  appt for Monday with Dr. Esqueda.    Problem List Items Addressed This Visit     None      Visit Diagnoses     Lumbar disc herniation    -  Primary        No orders of the defined types were placed in this encounter.    No follow-ups on file.     Medication List           Accurate as of 7/6/19 11:59 PM. If you have any questions, ask your nurse or doctor.               CHANGE how you take these medications    celecoxib 200 MG capsule  Commonly known as:  CeleBREX  TAKE 1 CAPSULE(200 MG) BY MOUTH TWICE DAILY  What changed:  See the new instructions.  Changed by:  Rosalva Howard MD        CONTINUE taking these medications    calcium citrate-vitamin D3 315-200 mg " 315-200 mg-unit per tablet  Commonly known as:  CITRACAL+D     ibuprofen 200 MG tablet  Commonly known as:  ADVIL,MOTRIN     levothyroxine 50 MCG tablet  Commonly known as:  SYNTHROID  TAKE 1 TABLET BY MOUTH EVERY DAY     MULTIVITAMIN ORAL     OMEGA-3 ORAL     ROZEREM 8 mg tablet  Generic drug:  ramelteon  TAKE 1 TABLET(8 MG) BY MOUTH EVERY EVENING     saw palmetto 160 MG capsule           Where to Get Your Medications      These medications were sent to [facility] [address] [place] [address] [address] AT [facility] [address]  [facility], [facility] [address]    Phone:  [phone]   · celecoxib 200 MG capsule

## 2019-07-06 NOTE — PATIENT INSTRUCTIONS
Herniated Intervertebral Disk  A herniated disk happens when a spinal disk tears. Spinal disks are gel-filled cushions that sit between the bones of the spine (vertebrae). If a disk tears, the center may bulge out. The disk may then press on nearby nerves. This can cause severe pain. Numbness or tingling in an arm or leg may also occur.  You may need X-rays of the spine. If you've had the pain for a few weeks and it is not getting better after treatment, you may need an MRI scan. Treatment for a herniated disk usually involves pain medicines and home care. Surgery is not usually done to treat a herniated disk unless the nerve problem is causing weakness or numbness.    Home care  Medicines may be prescribed to help relieve pain, spasm, and swelling. You may be given a prescription. Or you may be told to use an over-the-counter pain reliever such as ibuprofen or naproxen. Take all medicines as directed.  For neck pain:  · Use a pillow that supports your head and keeps your spine in a neutral position. Don't tilt your head forward or backward.  For back pain:  · Try not to sit for long periods, especially if your low back is affected. Sitting puts more stress on the low back than standing or walking.  · Sleep on a firm mattress with a pillow under your knees.  · Have a regular exercise program to help strengthen the muscles that support the spine.  · Avoid complete bed rest. It usually isnt helpful. It may even make your condition worse.  General care  · During the first 2 days after a pain flare-up, put an ice pack or bag of frozen peas on the painful area for 20 minutes. You can make your own ice pack by putting ice cubes in a plastic bag. Wrap the bag in a thin towel. Use the ice pack every 2 to 4 hours. This helps reduce swelling and pain.   · Physical therapy or osteopathic manipulative therapy may help if the pain doesnt go away. Talk with your healthcare provider about these therapies.  Follow-up  care  Follow up with your healthcare provider, or as advised. This is very important. If youve been referred to a specialist, make an appointment right away.  When to seek medical advice  Call your healthcare provider right away if any of these occur:  · Back pain gets worse  · New weakness, numbness, or pain in one or both arms or legs  · Numbness or tingling in your buttock or groin area  · Your foot drags as you walk or you have new weakness in a foot  · You cant control your bowel or bladder  · You arent able to have a bowel movement or pass urine  · Fever of 100.4°F (38°C) or higher, or as advised  · New symptoms that were not present during todays visit  Date Last Reviewed: 8/1/2016 © 2000-2017 [employer]. [address] [place] [address], [place] [address]. All rights reserved. This information is not intended as a substitute for professional medical care. Always follow your healthcare professional's instructions.

## 2019-07-08 ENCOUNTER — OFFICE VISIT (OUTPATIENT)
Dept: ORTHOPEDICS | Facility: CLINIC | Age: 84
End: 2019-07-08
Payer: MEDICARE

## 2019-07-08 VITALS — BODY MASS INDEX: 23.67 KG/M2 | HEIGHT: 68 IN | WEIGHT: 156.19 LBS

## 2019-07-08 DIAGNOSIS — M54.16 LUMBAR RADICULOPATHY: Primary | ICD-10-CM

## 2019-07-08 PROCEDURE — 1101F PR PT FALLS ASSESS DOC 0-1 FALLS W/OUT INJ PAST YR: ICD-10-PCS | Mod: HCNC,CPTII,S$GLB, | Performed by: ORTHOPAEDIC SURGERY

## 2019-07-08 PROCEDURE — 99999 PR PBB SHADOW E&M-EST. PATIENT-LVL III: ICD-10-PCS | Mod: PBBFAC,HCNC,, | Performed by: ORTHOPAEDIC SURGERY

## 2019-07-08 PROCEDURE — 99213 PR OFFICE/OUTPT VISIT, EST, LEVL III, 20-29 MIN: ICD-10-PCS | Mod: HCNC,S$GLB,, | Performed by: ORTHOPAEDIC SURGERY

## 2019-07-08 PROCEDURE — 99999 PR PBB SHADOW E&M-EST. PATIENT-LVL III: CPT | Mod: PBBFAC,HCNC,, | Performed by: ORTHOPAEDIC SURGERY

## 2019-07-08 PROCEDURE — 1101F PT FALLS ASSESS-DOCD LE1/YR: CPT | Mod: HCNC,CPTII,S$GLB, | Performed by: ORTHOPAEDIC SURGERY

## 2019-07-08 PROCEDURE — 99213 OFFICE O/P EST LOW 20 MIN: CPT | Mod: HCNC,S$GLB,, | Performed by: ORTHOPAEDIC SURGERY

## 2019-07-09 ENCOUNTER — TELEPHONE (OUTPATIENT)
Dept: INTERNAL MEDICINE | Facility: CLINIC | Age: 84
End: 2019-07-09

## 2019-07-09 ENCOUNTER — CLINICAL SUPPORT (OUTPATIENT)
Dept: OTOLARYNGOLOGY | Facility: CLINIC | Age: 84
End: 2019-07-09

## 2019-07-09 DIAGNOSIS — Z71.89 ENCOUNTER FOR HEARING AID CONSULTATION: Primary | ICD-10-CM

## 2019-07-09 PROCEDURE — 99499 NO LOS: ICD-10-PCS | Mod: S$GLB,,, | Performed by: AUDIOLOGIST-HEARING AID FITTER

## 2019-07-09 PROCEDURE — NOLTAX-P ORLEANS PRESCRIBED 4.5%: ICD-10-PCS | Mod: CSM,,, | Performed by: AUDIOLOGIST-HEARING AID FITTER

## 2019-07-09 PROCEDURE — V5256 PR HEARING AID, DIGIT, MON, ITE: ICD-10-PCS | Mod: CSM,,, | Performed by: AUDIOLOGIST-HEARING AID FITTER

## 2019-07-09 PROCEDURE — V5256 HEARING AID, DIGIT, MON, ITE: HCPCS | Mod: CSM,,, | Performed by: AUDIOLOGIST-HEARING AID FITTER

## 2019-07-09 PROCEDURE — NOLTAX-P ORLEANS PRESCRIBED 4.5%: Mod: CSM,,, | Performed by: AUDIOLOGIST-HEARING AID FITTER

## 2019-07-09 PROCEDURE — 99499 UNLISTED E&M SERVICE: CPT | Mod: S$GLB,,, | Performed by: AUDIOLOGIST-HEARING AID FITTER

## 2019-07-09 NOTE — TELEPHONE ENCOUNTER
----- Message from Mayo Ventura sent at 7/9/2019  2:59 PM CDT -----  Contact: [phone]  Type: Sooner appointment than  is able to schedule    When is the first available appointment?  No appointment      What is the nature of the appointment? Back pain     What appointment type: same day appointment     Comments: patient stated he only want to see his PCP . Please call and advise, Thanks

## 2019-07-09 NOTE — PROGRESS NOTES
Sami Kraus, CCC-A  Audiologist - [facility] [address] [place] [address]  [email or web address]  [phone]    Patient: Lavell Coyle   MRN: [id number]  : 1932  [place]: 2019      HEARING AID CONSULT      Hearing aid prices and styles were discussed with Mr. Lavell Coyle today.  He would like to order a hearing aid for his Left ear.  A hearing aid contract was reviewed at length and signed.          _______________________________  Sami Kraus, TOMA-A  Audiologist

## 2019-07-10 NOTE — PROGRESS NOTES
The patient returns for follow-up.  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  He has a history of low back pain and left greater than right lower extremity radiculopathy.    The back pain is very bothersome when standing, and prevents him from jogging or walking.  At rest it is a 2/10.  His sciatica is not severe.    His back pain is so bad that at some points he uses a wheelchair to get around long distances.  This has been progressively worse over the past year.    On physical examination there are no focal motor sensory deficits and symmetric bilateral lower extremity reflexes.    Radiographic imaging demonstrates a L2-3 retrolisthesis and a large free fragment at the L2-3 interspace.  I am also suspicious for a facet cyst on the left at L2-3 as well.    Today we discussed options, he has a predominance of back pain associated with retrolisthesis and a large lumbar disc herniation as well as possible facet cyst.  I recommended an attempt at conservative management consisting of epidural steroid injection.  We will see how he does with this.    I spent 15 minutes with the patient of which greater than 1/2 the time was devoted to counciling the patient regarding treatment options.

## 2019-07-11 ENCOUNTER — TELEPHONE (OUTPATIENT)
Dept: INTERNAL MEDICINE | Facility: CLINIC | Age: 84
End: 2019-07-11

## 2019-07-11 NOTE — TELEPHONE ENCOUNTER
----- Message from Cassidy Sam sent at 7/11/2019  2:21 PM CDT -----  Contact: Patient [phone]  Wants to know if you have any comments or suggestions from Dr. Esqueda about his lower back.    Please call and advise.    Thank You

## 2019-07-16 ENCOUNTER — OFFICE VISIT (OUTPATIENT)
Dept: INTERNAL MEDICINE | Facility: CLINIC | Age: 84
End: 2019-07-16
Payer: MEDICARE

## 2019-07-16 ENCOUNTER — CLINICAL SUPPORT (OUTPATIENT)
Dept: REHABILITATION | Facility: OTHER | Age: 84
End: 2019-07-16
Attending: PHYSICIAN ASSISTANT
Payer: MEDICARE

## 2019-07-16 VITALS
OXYGEN SATURATION: 96 % | SYSTOLIC BLOOD PRESSURE: 124 MMHG | BODY MASS INDEX: 23.36 KG/M2 | HEART RATE: 79 BPM | DIASTOLIC BLOOD PRESSURE: 64 MMHG | HEIGHT: 68 IN | WEIGHT: 154.13 LBS

## 2019-07-16 DIAGNOSIS — M51.26 LUMBAR DISC HERNIATION: Primary | ICD-10-CM

## 2019-07-16 DIAGNOSIS — R29.898 DECREASED STRENGTH OF TRUNK AND BACK: ICD-10-CM

## 2019-07-16 DIAGNOSIS — R29.3 POOR POSTURE: ICD-10-CM

## 2019-07-16 DIAGNOSIS — M25.69 DECREASED RANGE OF MOTION OF TRUNK AND BACK: ICD-10-CM

## 2019-07-16 PROCEDURE — 1101F PT FALLS ASSESS-DOCD LE1/YR: CPT | Mod: HCNC,CPTII,S$GLB, | Performed by: INTERNAL MEDICINE

## 2019-07-16 PROCEDURE — 97110 THERAPEUTIC EXERCISES: CPT | Mod: HCNC

## 2019-07-16 PROCEDURE — 99999 PR PBB SHADOW E&M-EST. PATIENT-LVL III: CPT | Mod: PBBFAC,HCNC,, | Performed by: INTERNAL MEDICINE

## 2019-07-16 PROCEDURE — 99999 PR PBB SHADOW E&M-EST. PATIENT-LVL III: ICD-10-PCS | Mod: PBBFAC,HCNC,, | Performed by: INTERNAL MEDICINE

## 2019-07-16 PROCEDURE — 99213 PR OFFICE/OUTPT VISIT, EST, LEVL III, 20-29 MIN: ICD-10-PCS | Mod: HCNC,S$GLB,, | Performed by: INTERNAL MEDICINE

## 2019-07-16 PROCEDURE — 99213 OFFICE O/P EST LOW 20 MIN: CPT | Mod: HCNC,S$GLB,, | Performed by: INTERNAL MEDICINE

## 2019-07-16 PROCEDURE — 97162 PT EVAL MOD COMPLEX 30 MIN: CPT | Mod: HCNC

## 2019-07-16 PROCEDURE — 1101F PR PT FALLS ASSESS DOC 0-1 FALLS W/OUT INJ PAST YR: ICD-10-PCS | Mod: HCNC,CPTII,S$GLB, | Performed by: INTERNAL MEDICINE

## 2019-07-16 NOTE — PLAN OF CARE
[facility]     Name: Lavell Coyle  Clinic Number: [id number]    Therapy Diagnosis:   Encounter Diagnoses   Name Primary?    Decreased strength of trunk and back     Decreased range of motion of trunk and back     Poor posture      Physician: Adrianne Hernandez PA-C    Physician Orders: PT Eval and Treat   Medical Diagnosis from Referral:   M53.9 (ICD-10-CM) - Back disorder    Evaluation Date: 7/16/2019  Authorization Period Expiration: 6/24/2020  Plan of Care Expiration: 10/16/2019  Reassessment Due: 8/16/2019  Visit # / Visits authorized: 1/ 10    Time In: 200  Time Out: 320  Total Billable Time: 80 minutes    Precautions: spondylolisthesis, osteopenia, hypothyroidism      Pattern of pain determined: 2      Subjective   Date of onset: Pt reports he has had back pain for years with high level exacerbation recently within the past few months.   History of current condition - Lavell reports: Pt states he has had back pain for years, but has recently experienced an increase within the past months. Pt states he has issues walking, standing, and exercising. Pain is back dominant with no radiating symptoms. Pain is in middle of back, equal on left and right. Pain is often not present in sitting, but increases with standing immediately. Pt reports attending therapy previously, but cannot remember when or what exactly he did. Pt states his pain decreases with sitting and flexion.      Medical History:   Past Medical History:   Diagnosis Date    Allergic blepharitis 5/20/2014    Basal cell cancer     History of mononucleosis     pupura    History of osteopenia     Hypothyroidism     Melanoma     Osteopenia     Prostate hypertrophy     Thrombocytopenia 2002    Thrombocytopenia 2002    Thyroid disease     Thyroid nodule 07/2009    left nodule       Surgical History:   Lavell Cyole  has a past surgical history that includes Moh; Skin cancer excision; Rotator cuff repair;  Carpal tunnel release; Knee surgery; and Knee arthroscopy.    Medications:   Lavell has a current medication list which includes the following prescription(s): calcium citrate-vitamin d3 315-200 mg, celecoxib, ibuprofen, levothyroxine, multivitamin, omega-3/dha/epa/fish oil, rozerem, and saw palmetto.    Allergies:   Review of patient's allergies indicates:  No Known Allergies     Imaging,   Mild retrolisthesis of L2 on L3 and grade 1 anterolisthesis of L4 on L5, unchanged.  No movement on flexion or extension.  Mild vertebral body height loss of the L1 vertebral body, unchanged.  Multilevel intervertebral disc space height loss and degenerative endplate changes.  Multilevel facet arthrosis, worse at L4-L5 and L5-S1.  No aggressive osseous abnormality.  No acute, displaced fracture.  Atherosclerotic calcification of the abdominal aorta. Per MD report   Prior Therapy: PT last year at [facility]   Prior Treatment: Massage therapist   Social History: Lives alone, steps entering home but there are no issues entering the home   Occupation: Retired Dentist   Leisure: Exercise and reading       Prior Level of Function: Full   Current Level of Function: Unable to exercise, walking, and prolonged standing due to symptoms.   DME owned/used: Weight belt         Pain:  Current 0/10, worst 8/10, best 0/10   Location: Center low back   Description: Aching and Dull  Aggravating Factors: Standing and Walking  Easing Factors: rest  Disturbed Sleep: No         Pattern of pain questions:  1.  Where is your pain the worst? Center low back   2.  Is your pain constant or intermittent? Intermittent   3.  Does bending forward make your typical pain worse? No   4.  Since the start of your back pain, has there been a change in your bowel or bladder? No   5.  What can't you do now that you use to be able to do? Exercise, stand, and walk without pain       Pts goals: Get back to exercising and get out of pain       Red Flag Screening:   Cough   Sneeze  Strain: (--)  Bladder/ bowel: (--)  Falls: (--)  Night pain: (--)  Unexplained weight loss: (--)  General health: Good     OBJECTIVE     Postural examination/scapula alignment: Rounded shoulder, Head forward and Slouched posture  Joint integrity: Soft end feel of L2 and L3 PA mobilization   Skin integrity: No notable skin integrity issues   Edema: Mild  Sitting: Poor- slouched, forward head, and rounded shoulders.   Standing: Fair- rounded shoulders decreased lumbar lordosis   Correction of posture: better with lumbar roll    MOVEMENT LOSS    ROM Loss   Flexion Within Normal Limits   Extension Moderate decrease- increased discomfort   Side bending Right Moderate decrease- increased discomfort on R   Side bending Left Moderate decrease- increased discomfort on L    Rotation Right Minimal decrease   Rotation Left Minimal decrease     Lower Extremity Strength  Right LE  Left LE    Hip flexion: 4-/5 Hip flexion: 4-/5   Hip extension:  4+/5 Hip extension: 4+/5   Hip abduction: 5/5 Hip abduction: 5/5   Hip adduction:  5/5 Hip adduction:  5/5   Knee Flexion 5/5 Knee Flexion 5/5   Knee Extension 5/5 Knee Extension 5/5   Ankle dorsiflexion: 5/5 Ankle dorsiflexion: 5/5   Ankle plantarflexion: 5/5 Ankle plantarflexion: 5/5       GAIT:  Assistive Device used: none  Level of Assistance: independent  Patient displays the following gait deviations:  Decreased bilateral step length, increase trunk flexion    Special Tests:   Test Name  Test Result   Prone Instability Test (--)   SI Joint Provocation Test (--)   Straight Leg Raise (--)   Neural Tension Test (--)   Crossed Straight Leg Raise (--)   Walking on toes (+)- Due to balance   Walking on heels  (+)- due to balance        NEUROLOGICAL SCREENING     Sensory deficit: Intact     Reflexes:    Left Right   Patella Tendon 2+ 2+   Achilles Tendon 2+ 2+   Babinski  (--) (--)   Clonus (--) (--)     REPEATED TEST MOVEMENTS:  Repeated Flexion in Standing Improved symptoms with  repeated motion. Decreased pain reported in low back with standing    Repeated Extension in Standing Increased pain localized to center lower lumbar region   Repeated Flexion in lying Decreased symptoms with repeated motion    Repeated Extension in lying  No change in symptoms following; slight increase in symptoms following       STATIC TESTS   Sitting slouched  better   Sitting erect worse   Standing slouched better   Standing erect  no effect   Lying prone in extension  no effect   Long sitting   NT        Baseline Isometric Testing on Med X equipment: Testing administered by PT  Date of testin2019  ROM 48-0 deg   Max Peak Torque 133    Min Peak Torque 41    Flex/Ext Ratio 3.21:1   % below normative data 13%         CMS Impairment/Limitation/Restriction for FOTO Survey    Therapist reviewed FOTO scores for Lavell Coyle on 2019.   FOTO documents entered into [employer] - see Media section.    Limitation Score: 52%  Category: Mobility    Current : CK = at least 40% but < 60% impaired, limited or restricted  Goal: CJ = at least 20% but < 40% impaired, limited or restricted  Discharge:              Treatment   Treatment Time In: 250  Treatment Time Out: 320  Total Treatment time separate from Evaluation: 30 minutes      Lavell received therapeutic exercises to develop/improve posture, lumbar/cervical ROM, strength and muscular endurance for 30 minutes including the following exercises:   [facility] 2019   Visit Number 1   VAS Pain Rating 2   Treadmill Time (in min.) -   Speed -   Lumbar Stretches - Slouch Overcorrection 10   Extension in Lying 10   Extension in Standing 10   Flexion in Lying 10   Flexion in Sitting 10   Lumbar Extension Seat Pad 0   Femur Restraint 5   Top Dead Center 24   Counterweight 90   Lumbar Flexion 48   Lumbar Extension 0   Lumbar Peak Torque 133   Min Torque 41   Test Percent Below Normative Data 13   Lumbar Weight -   Repetitions -   Rating of Perceived Exertion -    Ice - Z Lie (in min.) 10               Written Home Exercises Provided: yes.  Exercises were reviewed and Lavell was able to demonstrate them prior to the end of the session.  Lavell demonstrated good  understanding of the education provided.     See EMR under Media for exercises provided 7/16/2019.      Education provided:   - Patient received education regarding proper posture and body mechanics.  Patient was given top 10 tips handout which discusses posture seated, standing, lifting correctly, components of exercise, importance of nutrition and hydration, and importance of sleep.  - Rebecca roll tried, recommended, and purchase information was provided.    - Patient received a handout regarding anticipated muscular soreness following the isometric test and strategies for management were reviewed with patient including stretching, using ice and scheduled rest.   - Patient received education on the Healthy Back program, purpose of the isometric test, progression of back strengthening as well as wellness approach and systemic strengthening.  Details of the program were discussed.  Reviewed that patient should feel support/pressure from med ex restraints but no pain or discomfort and patient expressed understanding.    Lavell received cold pack for 10 minutes to low back.    Assessment   Lavell is a 87 y.o. male referred to [facility] with a medical diagnosis of back disorder. Pt presents with primary complaints of back pain and subsequent limitations in function. Upon examination pt is limited in lumbar ROM extension and sidebending with both exacerbating symptoms. There are no radiating symptoms elicited with repeated extension or side bending motions. Pt responds to repeated standing and supine flexion with decreased reported symptoms. All neurological tests are negative. Pt demonstrates decreased lumbar extension core strength per med x testing- 13% below normative data. Pt would benefit from  implementation of physical therapy aiming to address aforementioned deficits and enable safe return to previous level of activity and participation.     Pain Pattern: 2       Pt prognosis is Good.   Pt will benefit from skilled outpatient Physical Therapy to address the deficits stated above and in the chart below, provide pt/family education, and to maximize pt's level of independence. Based on the above history and physical examination an active physical therapy program is recommended.  Pt will continue to benefit from skilled outpatient physical therapy to address the deficits listed below in the chart, provide pt/family education and to maximize pt's level of independence in the home and community environment. .       Plan of care discussed with patient: Yes  Pt's spiritual, cultural and educational needs considered and patient is agreeable to the plan of care and goals as stated below:     Anticipated Barriers for therapy: Memory deficits, age     PT Evaluation Completed? Yes    Medical necessity is demonstrated by the following problem list.    Pt presents with the following impairments:     History  Co-morbidities and personal factors that may impact the plan of care Co-morbidities:   advanced age , hypothyroidism, osteopenia   Personal Factors:   age     moderate   Examination  Body Structures and Functions, activity limitations and participation restrictions that may impact the plan of care Body Regions:   back  lower extremities  trunk    Body Systems:    ROM  strength  gait   Motor control    Participation Restrictions:   Leisure    Activity limitations:   Learning and applying knowledge  no deficits    General Tasks and Commands  no deficits    Communication  no deficits    Mobility  lifting and carrying objects  walking  moving around using equipment (WC)    Self care  no deficits    Domestic Life  shopping  cooking  doing house work (cleaning house, washing dishes,  laundry)    Interactions/Relationships  no deficits    Life Areas  no deficits    Community and Social Life  community life  recreation and leisure         moderate   Clinical Presentation evolving clinical presentation with changing clinical characteristics moderate   Decision Making/ Complexity Score: moderate       GOALS: Pt is in agreement with the following goals.    Short term goals:  6 weeks or 10 visits   1.  Pt will demonstrate increased lumbar ROM by at least 6 degrees from the initial ROM value with improvements noted in functional ROM and ability to perform ADLs.  2.  Pt will demonstrate increased maximum isometric torque value by 10% when compared to the initial value resulting in improved ability to perform bending, lifting, and carrying activities safely, confidently.    3.  Patient report a reduction in worst pain score by 1-2 points for improved tolerance for walking, standing and exercising.  4.  Pt able to perform HEP correctly with minimal cueing or supervision from therapist to encourage independent management of symptoms.       Long term goals: 13 weeks or 20 visits   1. Pt will demonstrate increased lumbar ROM by at least 8 degrees from initial ROM value, resulting in improved ability to perform functional fwd bending while standing and sitting.   2. Pt will demonstrate increased maximum isometric torque value by 14% when compared to the initial value resulting in improved ability to perform bending, lifting, and carrying activities safely, confidently.  3. Pt to demonstrate ability to independently control and reduce their pain through posture positioning and mechanical movements throughout a typical day.  4.  Pt will demonstrate reduced pain and improved functional outcomes as reported on the FOTO by reaching a score of CJ = at least 20% but < 40% impaired, limited or restricted or less in order to demonstrate subjective improvement in pt's condition.    5. Pt will demonstrate independence  "with the HEP at discharge  6. Pt will be able to exercise without limitation due to back pain.       Plan   Outpatient physical therapy 2x week for 10 weeks or 20 visits to include the following:   - Patient education  - Therapeutic exercise  - Manual therapy  - Performance testing   - Neuromuscular Re-education  - Therapeutic activity   - Modalities    Pt may be seen by PTA as part of the rehabilitation team.     Therapist: Carlos Valentine, PT  7/16/2019    "I certify the need for these services furnished under this plan of treatment and while under my care."    ____________________________________  Physician/Referring Practitioner    _______________  Date of Signature          "

## 2019-07-16 NOTE — PATIENT INSTRUCTIONS
Pelvic Tilt: Posterior - Legs Bent (Supine)        Tighten stomach and flatten back by rolling pelvis down. Hold ____ seconds. Relax.  Repeat ____ times per set. Do ____ sets per session. Do ____ sessions per day.     [email or web address]     Copyright © [employer]. All rights reserved.   Knee-to-Chest Stretch: Bilateral        With hands behind knees, pull both knees in to chest until a comfortable stretch is felt in lower back and buttocks. Keep back relaxed. Hold ____ seconds.  Repeat ____ times per set. Do ____ sets per session. Do ____ sessions per day.     [email or web address]     Copyright © [employer]. All rights reserved.     Top 10 tips for back and neck pain    The spine is the pillar of the body, providing the foundation for the upper and lower extremities to attach.  Our spines withstand significant forces all day long.  There are many ways in which we can take care of our backs.  Here are a couple tips to help you keep your back in action.    1. Watch your posture in sitting.     Sit in chairs with back supports, and use a lumbar roll to maintain the normal curve of your low back. Ensure the height of your chair is such that your feet rest flat on the floor with your knees and hips level.  The average American sits 9 hours a day.  Do not sit longer than 1 hour without getting up to stretch or move.     2. Watch your posture in standing.   Maintain the normal curves of your spine in standing.   When standing tall, you should be able to draw a line down through your ear, shoulder, hip, and ankle.  Wear good shoes and consider using a standing desk mat if you stand a lot during work.  Take breaks from standing.    3. Lift correctly   Lift objects by using the strong muscles in your legs.  Get close to the object, bend your knees and your hips, and maintain the normal curve of your low back. Do not twist when lifting or carrying items. Think about the tasks you perform daily at work or home, and minimize lifting  and carrying objects.  Use rolling carts or other strategies to reduce back strain.    4. Exercise regularly  Individuals who exercise regularly generally experience better health, reduced back pain, and less stress.  A good exercise program has a stretching component, a strengthening component, and an aerobic component.   Maintain the mobility of your spine by stretching daily. Strengthen your core and extremities several times a week.   Get regular cardiovascular exercise, 3-5/week.  Choose activities you like such as walking, swimming, dancing, or riding a bike.     5. Quit Smoking  Smoking increases the likelihood of back pain.  It is thought that smoking reduces the blood supply to the discs between the vertebrae and this may lead to degeneration of these discs.  Talk to your Physician about quitting.  There are many smoking cessation options that may work for you.    6. Keep moving even when you have pain  Motion is lotion.   The majority of back pain is mechanical in nature, and will likely reduce with gentle movements, stretching, and walking.  As tempting as it may be to stay in bed when you are hurting, remember that you will likely feel better by getting up and gently moving and walking.  Limit bed rest.      7. Maintain a healthy diet  Try to maintain a healthy diet and weight.        8. Stay hydrated  The average adult is approximately 60 % water.  Staying hydrated is beneficial for all aspects of health.  In general, an adult should drink half of their body weight in ounces.  For example, if you weight 180 lbs, you should drink 90 ounces of water daily.     9. Get regular sleep   Ensure that you get a good nights rest on a regular basis. The discs in your spine hydrate when you lie down to sleep. Your spine needs the rest too.     10. See Your Physician    Make an appointment to see your Physician for back pain that is progressively worsening, and for back pain that is no better or worse with  changing positions and activities.        Z LIE POSITION  Z Lie is a position that you can use to unload your back and assist with pain reduction.  Lie on your back and rest your calves on the seat on a chair or a bench.  Viewed from the side, you should resemble a Z.  Your therapist may suggest sliding the chair closer to you, so your knees are over your stomach.   Your therapist may also suggest a pillow under your buttock if needed.  Follow the directions from your therapist.  The goal of this position is to reduce your symptoms.    Z lie can be done in a variety of ways.  It can be done on a bed resting your legs on a light and easy to lift chair

## 2019-07-16 NOTE — PROGRESS NOTES
Subjective:      Patient ID: Lavell Coyle is a 87 y.o. male.    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 Esqueda.  The patient has cognitive decline in did not remember the specifics until we began speaking about the appointment.  I have given him a copy of the recommendations made and we carefully went through the recommendations.  He would consent to an epidural steroid injection as a trial.  I have sent a message to Dr. Esqueda.  Patient Active Problem List   Diagnosis    BPH with urinary obstruction    Thyroid nodule    Age-related osteoporosis with current pathological fracture with routine healing    Skin cancer    History of melanoma excision    Basal cell cancer    MCI (mild cognitive impairment) with memory loss    Hypothyroidism    Medial meniscus tear    S/P R knee surgery, medial/lateral menisectomy, loose body removal    Range of motion deficit    Decreased strength    Nuclear cataract    Ocular migraine    Blepharitis of both eyes    Bilateral dry eyes    Memory loss    Tortuous aorta    Meningiomas, multiple    History of vertebral compression fracture    Acute left-sided low back pain without sciatica    Decreased strength of trunk and back    Decreased range of motion of trunk and back    Poor posture     Past Medical History:   Diagnosis Date    Allergic blepharitis 5/20/2014    Basal cell cancer     History of mononucleosis     pupura    History of osteopenia     Hypothyroidism     Melanoma     Osteopenia     Prostate hypertrophy     Thrombocytopenia 2002    Thrombocytopenia 2002    Thyroid disease     Thyroid nodule 07/2009    left nodule     Past Surgical History:   Procedure Laterality Date    ARTHROSCOPY, KNEE Right 4/23/2014    [facility]    CARPAL TUNNEL RELEASE      left    CHONDRECTOMY, KNEE,  "SEMILUNAR CARTILAGE Right 4/23/2014    Per[facility] [place]    KNEE ARTHROSCOPY      KNEE SURGERY      Moh      surgery    ROTATOR CUFF REPAIR      right    SKIN CANCER EXCISION       Family History   Problem Relation Age of Onset    Cancer Other     Alcohol abuse Father     Prostate cancer Neg Hx     Kidney disease Neg Hx      Review of Systems no new findings  Objective:     Vitals:    07/16/19 1039   BP: 124/64   Pulse: 79   SpO2: 96%   Weight: 69.9 kg (154 lb 1.6 oz)   Height: 5' 8" (1.727 m)   PainSc:   6   PainLoc: Back     Body mass index is 23.43 kg/m².  Physical Exam no further exam done  Assessment:     1. Lumbar disc herniation      Plan:   Lavell was seen today for low-back pain.    Diagnoses and all orders for this visit:    Lumbar disc herniation:  I have sent trying to clarify who will order the MCKAYLA.  I have told the patient that he should call me on Monday and we should have a plan in place.        Problem List Items Addressed This Visit     None      Visit Diagnoses     Lumbar disc herniation    -  Primary        No orders of the defined types were placed in this encounter.    No follow-ups on file.     Medication List           Accurate as of 7/16/19  6:21 PM. If you have any questions, ask your nurse or doctor.               CONTINUE taking these medications    calcium citrate-vitamin D3 315-200 mg 315-200 mg-unit per tablet  Commonly known as:  CITRACAL+D     celecoxib 200 MG capsule  Commonly known as:  CeleBREX  TAKE 1 CAPSULE(200 MG) BY MOUTH TWICE DAILY     ibuprofen 200 MG tablet  Commonly known as:  ADVIL,MOTRIN     levothyroxine 50 MCG tablet  Commonly known as:  SYNTHROID  TAKE 1 TABLET BY MOUTH EVERY DAY     MULTIVITAMIN ORAL     OMEGA-3 ORAL     ROZEREM 8 mg tablet  Generic drug:  ramelteon  TAKE 1 TABLET(8 MG) BY MOUTH EVERY EVENING     saw palmetto 160 MG capsule            "

## 2019-07-19 ENCOUNTER — TELEPHONE (OUTPATIENT)
Dept: INTERNAL MEDICINE | Facility: CLINIC | Age: 84
End: 2019-07-19

## 2019-07-19 NOTE — TELEPHONE ENCOUNTER
----- Message from Nury Morel sent at 7/19/2019 11:59 AM CDT -----  Contact: self/[phone]  Pt states that he was supposed to have an injection on his back but nobody has contacted him about it so he's wondering who he needs to get in contact with. Please call and advise.        Thank You

## 2019-07-19 NOTE — TELEPHONE ENCOUNTER
Dr. Esqueda's office. If you can give him the number you may. ( I am waiting for a return message that I sent last week). Dr. Howard

## 2019-07-19 NOTE — TELEPHONE ENCOUNTER
The pt was informed and given the contact # ([phone]) to Dr. Esqueda's office and he verbalized his understanding.

## 2019-07-22 ENCOUNTER — TELEPHONE (OUTPATIENT)
Dept: PAIN MEDICINE | Facility: CLINIC | Age: 84
End: 2019-07-22

## 2019-07-22 ENCOUNTER — TELEPHONE (OUTPATIENT)
Dept: ORTHOPEDICS | Facility: HOSPITAL | Age: 84
End: 2019-07-22

## 2019-07-22 DIAGNOSIS — M54.16 LUMBAR RADICULOPATHY: Primary | ICD-10-CM

## 2019-07-22 NOTE — TELEPHONE ENCOUNTER
----- Message from Milly Choi MA sent at 7/19/2019  3:57 PM CDT -----  Contact: self  He saw Dr Esqueda on 7/8 and he recommended him for an epidural inj.  Can we put orders in and I will let him know [facility] will call to schedule with him?  ----- Message -----  From: Jerman Ventura  Sent: 7/19/2019   3:46 PM  To: [facility] Staff    Needs Advice    Reason for call: Pt ask for a call in regards to being referred to Dr Esqueda for an injection        Communication Preference: [phone]    Additional Information: n/a

## 2019-07-29 ENCOUNTER — CLINICAL SUPPORT (OUTPATIENT)
Dept: REHABILITATION | Facility: OTHER | Age: 84
End: 2019-07-29
Attending: PHYSICIAN ASSISTANT
Payer: MEDICARE

## 2019-07-29 DIAGNOSIS — M25.69 DECREASED RANGE OF MOTION OF TRUNK AND BACK: ICD-10-CM

## 2019-07-29 DIAGNOSIS — R29.3 POOR POSTURE: ICD-10-CM

## 2019-07-29 DIAGNOSIS — R29.898 DECREASED STRENGTH OF TRUNK AND BACK: ICD-10-CM

## 2019-07-29 PROCEDURE — 97110 THERAPEUTIC EXERCISES: CPT | Mod: HCNC

## 2019-07-29 NOTE — PROGRESS NOTES
[facility]      Name: Lavell Coyle  Clinic Number: [id number]    Therapy Diagnosis:   Encounter Diagnoses   Name Primary?    Decreased strength of trunk and back     Decreased range of motion of trunk and back     Poor posture      Physician: Adrianne Hernandez PA-C    Visit Date: 2019    Physician Orders: PT Eval and Treat   Medical Diagnosis from Referral:   M53.9 (ICD-10-CM) - Back disorder     Evaluation Date: 2019  Authorization Period Expiration: 2020  Plan of Care Expiration: 10/16/2019  Reassessment Due: 2019  Visit # / Visits authorized: 2/ 10     Time In: 110  Time Out: 150  Total Billable Time: 40 minutes     Precautions: spondylolisthesis, osteopenia, hypothyroidism       Pattern of pain determined: 2      Subjective   Lavell reports mild increased symptoms today. Pt states his pain level are elevated today       Patient reports tolerating previous visit well with minimal soreness   Patient reports their pain to be 3/10 on a 0-10 scale with 0 being no pain and 10 being the worst pain imaginable.  Pain Location: low back  Occupation: Retired Dentist   Leisure: Exercise and reading     Pts goals: Get back to exercising and get out of pain          Objective     Baseline IM Testing Results:   Date of testin2019  ROM 48-0 deg   Max Peak Torque 133    Min Peak Torque 41    Flex/Ext Ratio 3.21:1   % below normative data 13%            CMS Impairment/Limitation/Restriction for FOTO Survey     Therapist reviewed FOTO scores for Lavell Coyle on 2019.   FOTO documents entered into [employer] - see Media section.     Limitation Score: 52%  Category: Mobility     Current : CK = at least 40% but < 60% impaired, limited or restricted  Goal: CJ = at least 20% but < 40% impaired, limited or restricted  Discharge:              Treatment    Pt was instructed in and performed the following:     Lavell received therapeutic exercises to develop/improved posture,  cardiovascular endurance, muscular endurance, lumbar/cervical ROM, strength and muscular endurance for 60 minutes including the following exercises:   [facility] 7/29/2019   Visit Number 2   VAS Pain Rating 3   Treadmill Time (in min.) -   Speed -   Lumbar Stretches - Slouch Overcorrection -   Extension in Lying -   Extension in Standing -   Flexion in Lying 10   Flexion in Sitting -   Lumbar Extension Seat Pad -   Femur Restraint -   Top Dead Center -   Counterweight -   Lumbar Flexion 33   Lumbar Extension 0   Lumbar Peak Torque -   Min Torque -   Test Percent Below Normative Data -   Lumbar Weight 55   Repetitions 20   Rating of Perceived Exertion 3   Ice - Z Lie (in min.) 10   PPT x10--> marching x10   Bridges x10   DKTC w/ SB x20     Peripheral muscle strengthening which included 1 set of 15-20 repetitions at a slow, controlled 10-13 second per rep pace focused on strengthening supporting musculature for improved body mechanics and functional mobility.  Pt and therapist focused on proper form during treatment to ensure optimal strengthening of each targeted muscle group.  Machines were utilized including torso rotation, leg extension, leg curl, chest press, upright row. Tricep extension, bicep curl, leg press, and hip abduction added visit 3          Home Exercises Provided and Patient Education Provided     Education provided:   - Proper completion on exercises provided in HEP     Written Home Exercises Provided: Patient instructed to cont prior HEP.  Exercises were reviewed and Lavell was able to demonstrate them prior to the end of the session.  Lavell demonstrated good  understanding of the education provided.     See EMR under Patient Instructions for exercises provided prior visit.          Assessment     Pt presents today with reports of increased low back pain today. Pt reports back pain began over the weekend and is still present. Pt presents with lower back brace donned, removed for exercises  in gym. Pt unable to complete previously established ROM on this date secondary to increased pain. Although pt is able to complete 20 repetitions at current resistance, pt ROM at subsequent visits will be observed prior to increased resistance in attempts to reestablish previous ROM.   Patient is making good progress towards established goals.  Pt will continue to benefit from skilled outpatient physical therapy to address the deficits stated in the impairment chart, provide pt/family education and to maximize pt's level of independence in the home and community environment.     Anticipated Barriers for therapy: Memory deficits, age        Pt's spiritual, cultural and educational needs considered and pt agreeable to plan of care and goals as stated below:         GOALS: Pt is in agreement with the following goals.     Short term goals:  6 weeks or 10 visits   1.  Pt will demonstrate increased lumbar ROM by at least 6 degrees from the initial ROM value with improvements noted in functional ROM and ability to perform ADLs.  2.  Pt will demonstrate increased maximum isometric torque value by 10% when compared to the initial value resulting in improved ability to perform bending, lifting, and carrying activities safely, confidently.     3.  Patient report a reduction in worst pain score by 1-2 points for improved tolerance for walking, standing and exercising.  4.  Pt able to perform HEP correctly with minimal cueing or supervision from therapist to encourage independent management of symptoms.         Long term goals: 13 weeks or 20 visits   1. Pt will demonstrate increased lumbar ROM by at least 8 degrees from initial ROM value, resulting in improved ability to perform functional fwd bending while standing and sitting.   2. Pt will demonstrate increased maximum isometric torque value by 14% when compared to the initial value resulting in improved ability to perform bending, lifting, and carrying activities safely,  confidently.  3. Pt to demonstrate ability to independently control and reduce their pain through posture positioning and mechanical movements throughout a typical day.  4.  Pt will demonstrate reduced pain and improved functional outcomes as reported on the FOTO by reaching a score of CJ = at least 20% but < 40% impaired, limited or restricted or less in order to demonstrate subjective improvement in pt's condition.    5. Pt will demonstrate independence with the HEP at discharge  6. Pt will be able to exercise without limitation due to back pain.       Plan   Continue with established Plan of Care towards established PT goals.

## 2019-07-31 ENCOUNTER — CLINICAL SUPPORT (OUTPATIENT)
Dept: REHABILITATION | Facility: OTHER | Age: 84
End: 2019-07-31
Attending: PHYSICIAN ASSISTANT
Payer: MEDICARE

## 2019-07-31 DIAGNOSIS — R29.3 POOR POSTURE: ICD-10-CM

## 2019-07-31 DIAGNOSIS — M25.69 DECREASED RANGE OF MOTION OF TRUNK AND BACK: ICD-10-CM

## 2019-07-31 DIAGNOSIS — R29.898 DECREASED STRENGTH OF TRUNK AND BACK: ICD-10-CM

## 2019-07-31 PROCEDURE — 97110 THERAPEUTIC EXERCISES: CPT | Mod: HCNC

## 2019-07-31 NOTE — PROGRESS NOTES
[facility]      Name: Lavell Coyle  Clinic Number: [id number]    Therapy Diagnosis:   Encounter Diagnoses   Name Primary?    Decreased strength of trunk and back     Decreased range of motion of trunk and back     Poor posture      Physician: Adrianne Hernandez PA-C    Visit Date: 2019    Physician Orders: PT Eval and Treat   Medical Diagnosis from Referral:   M53.9 (ICD-10-CM) - Back disorder     Evaluation Date: 2019  Authorization Period Expiration: 2020  Plan of Care Expiration: 10/16/2019  Reassessment Due: 2019  Visit # / Visits authorized: 3/ 20     Time In: 100  Time Out: 200  Total Billable Time: 30 minutes     Precautions: spondylolisthesis, osteopenia, hypothyroidism       Pattern of pain determined: 2      Subjective   Lavell reports mild increased soreness yesterday following previous session on Monday.       Patient reports tolerating previous visit well, but did experience mild soreness following day.   Patient reports their pain to be 3/10 on a 0-10 scale with 0 being no pain and 10 being the worst pain imaginable.  Pain Location: low back  Occupation: Retired Dentist   Leisure: Exercise and reading     Pts goals: Get back to exercising and get out of pain          Objective     Baseline IM Testing Results:   Date of testin2019  ROM 48-0 deg   Max Peak Torque 133    Min Peak Torque 41    Flex/Ext Ratio 3.21:1   % below normative data 13%            CMS Impairment/Limitation/Restriction for FOTO Survey     Therapist reviewed FOTO scores for Lavell Coyle on 2019.   FOTO documents entered into [employer] - see Media section.     Limitation Score: 52%  Category: Mobility     Current : CK = at least 40% but < 60% impaired, limited or restricted  Goal: CJ = at least 20% but < 40% impaired, limited or restricted  Discharge:              Treatment    Pt was instructed in and performed the following:     Lavell received therapeutic exercises to  develop/improved posture, cardiovascular endurance, muscular endurance, lumbar/cervical ROM, strength and muscular endurance for 50 minutes including the following exercises:   [facility] 7/31/2019   Visit Number 2   VAS Pain Rating 3   Treadmill Time (in min.) -   Speed -   Time 10   Lumbar Stretches - Slouch Overcorrection -   Extension in Lying -   Extension in Standing -   Flexion in Lying 10   Flexion in Sitting -   Lumbar Extension Seat Pad -   Femur Restraint -   Top Dead Center -   Counterweight -   Lumbar Flexion -   Lumbar Extension -   Lumbar Peak Torque -   Min Torque -   Test Percent Below Normative Data -   Lumbar Weight 57   Repetitions 20   Rating of Perceived Exertion 3   Ice - Z Lie (in min.) 10         PPT x10--> marching x10   Bridges x10   DKTC w/ SB x20   LTR 20x       Peripheral muscle strengthening which included 1 set of 15-20 repetitions at a slow, controlled 10-13 second per rep pace focused on strengthening supporting musculature for improved body mechanics and functional mobility.  Pt and therapist focused on proper form during treatment to ensure optimal strengthening of each targeted muscle group.  Machines were utilized including torso rotation, leg extension, leg curl, chest press, upright row. Tricep extension, bicep curl, leg press, and hip abduction added visit 3          Home Exercises Provided and Patient Education Provided     Education provided:   - Proper completion on exercises provided in HEP     Written Home Exercises Provided: Patient instructed to cont prior HEP.  Exercises were reviewed and Lavell was able to demonstrate them prior to the end of the session.  Lavell demonstrated good  understanding of the education provided.     See EMR under Patient Instructions for exercises provided prior visit.          Assessment   Pt able to complete all components of session with no adverse effects. Pt able to tolerate increased resistance with ability to complete 20  repetitions with 3 RPE. Increase resistance at next session 5%. Evaluate pt ROM for medx next visit.     Patient is making good progress towards established goals.  Pt will continue to benefit from skilled outpatient physical therapy to address the deficits stated in the impairment chart, provide pt/family education and to maximize pt's level of independence in the home and community environment.     Anticipated Barriers for therapy: Memory deficits, age        Pt's spiritual, cultural and educational needs considered and pt agreeable to plan of care and goals as stated below:         GOALS: Pt is in agreement with the following goals.     Short term goals:  6 weeks or 10 visits   1.  Pt will demonstrate increased lumbar ROM by at least 6 degrees from the initial ROM value with improvements noted in functional ROM and ability to perform ADLs.  2.  Pt will demonstrate increased maximum isometric torque value by 10% when compared to the initial value resulting in improved ability to perform bending, lifting, and carrying activities safely, confidently.     3.  Patient report a reduction in worst pain score by 1-2 points for improved tolerance for walking, standing and exercising.  4.  Pt able to perform HEP correctly with minimal cueing or supervision from therapist to encourage independent management of symptoms.         Long term goals: 13 weeks or 20 visits   1. Pt will demonstrate increased lumbar ROM by at least 8 degrees from initial ROM value, resulting in improved ability to perform functional fwd bending while standing and sitting.   2. Pt will demonstrate increased maximum isometric torque value by 14% when compared to the initial value resulting in improved ability to perform bending, lifting, and carrying activities safely, confidently.  3. Pt to demonstrate ability to independently control and reduce their pain through posture positioning and mechanical movements throughout a typical day.  4.  Pt will  demonstrate reduced pain and improved functional outcomes as reported on the FOTO by reaching a score of CJ = at least 20% but < 40% impaired, limited or restricted or less in order to demonstrate subjective improvement in pt's condition.    5. Pt will demonstrate independence with the HEP at discharge  6. Pt will be able to exercise without limitation due to back pain.       Plan   Continue with established Plan of Care towards established PT goals.

## 2019-08-14 ENCOUNTER — TELEPHONE (OUTPATIENT)
Dept: INTERNAL MEDICINE | Facility: CLINIC | Age: 84
End: 2019-08-14

## 2019-08-14 DIAGNOSIS — E55.9 MILD VITAMIN D DEFICIENCY: Primary | ICD-10-CM

## 2019-08-14 DIAGNOSIS — E03.9 HYPOTHYROIDISM, UNSPECIFIED TYPE: ICD-10-CM

## 2019-08-14 NOTE — TELEPHONE ENCOUNTER
----- Message from Naila Fowler sent at 8/14/2019  3:29 PM CDT -----  Contact: self/ [phone]  Doctor appointment and lab have been scheduled.  Please link lab orders to the lab appointment.  Date of doctor appointment:  10/7  Date of lab appointment:  9/30  Physical or EP: phys  Comments:

## 2019-08-19 ENCOUNTER — CLINICAL SUPPORT (OUTPATIENT)
Dept: REHABILITATION | Facility: OTHER | Age: 84
End: 2019-08-19
Attending: PHYSICIAN ASSISTANT
Payer: MEDICARE

## 2019-08-19 DIAGNOSIS — R29.3 POOR POSTURE: ICD-10-CM

## 2019-08-19 DIAGNOSIS — R29.898 DECREASED STRENGTH OF TRUNK AND BACK: ICD-10-CM

## 2019-08-19 DIAGNOSIS — M25.69 DECREASED RANGE OF MOTION OF TRUNK AND BACK: ICD-10-CM

## 2019-08-19 PROCEDURE — 97110 THERAPEUTIC EXERCISES: CPT | Mod: HCNC

## 2019-08-19 NOTE — PROGRESS NOTES
[facility]      Name: Lavell Coyle  Clinic Number: [id number]    Therapy Diagnosis:   Encounter Diagnoses   Name Primary?    Decreased strength of trunk and back     Decreased range of motion of trunk and back     Poor posture      Physician: Adrianne Hernandez PA-C    Visit Date: 2019    Physician Orders: PT Eval and Treat   Medical Diagnosis from Referral:   M53.9 (ICD-10-CM) - Back disorder     Evaluation Date: 2019  Authorization Period Expiration: 2020  Plan of Care Expiration: 10/16/2019  Reassessment Due: 2019  Visit # / Visits authorized:      Time In: 2:00  Time Out: 3:00  Total Billable Time: 30 minutes     Precautions: spondylolisthesis, osteopenia, hypothyroidism       Pattern of pain determined: 2      Subjective   Lavell reports mild increased soreness yesterday following previous session on Monday.       Patient reports tolerating previous visit well, but did experience mild soreness following day.   Patient reports their pain to be 3/10 on a 0-10 scale with 0 being no pain and 10 being the worst pain imaginable.  Pain Location: low back  Occupation: Retired Dentist   Leisure: Exercise and reading     Pts goals: Get back to exercising and get out of pain          Objective     Baseline IM Testing Results:   Date of testin2019  ROM 48-0 deg   Max Peak Torque 133    Min Peak Torque 41    Flex/Ext Ratio 3.21:1   % below normative data 13%            CMS Impairment/Limitation/Restriction for FOTO Survey     Therapist reviewed FOTO scores for Lavell Coyle on 2019.   FOTO documents entered into [employer] - see Media section.     Limitation Score: 52%  Category: Mobility     Current : CK = at least 40% but < 60% impaired, limited or restricted  Goal: CJ = at least 20% but < 40% impaired, limited or restricted  Discharge:              Treatment    Pt was instructed in and performed the following:     Lavell received therapeutic exercises to  develop/improved posture, cardiovascular endurance, muscular endurance, lumbar/cervical ROM, strength and muscular endurance for 50 minutes including the following exercises:   [facility] 8/19/2019   Visit Number 3   VAS Pain Rating 3   Treadmill Time (in min.) 10   Speed -   Time -   Lumbar Stretches - Slouch Overcorrection -   Extension in Lying -   Extension in Standing -   Flexion in Lying -   Flexion in Sitting -   Lumbar Extension Seat Pad -   Femur Restraint -   Top Dead Center -   Counterweight -   Lumbar Flexion 42   Lumbar Extension 0   Lumbar Peak Torque -   Min Torque -   Test Percent Below Normative Data -   Lumbar Weight 57   Repetitions 20   Rating of Perceived Exertion 4   Ice - Z Lie (in min.) 10         PPT x10--> marching x10   Bridges x10   DKTC x10   LTR 10x       Peripheral muscle strengthening which included 1 set of 15-20 repetitions at a slow, controlled 10-13 second per rep pace focused on strengthening supporting musculature for improved body mechanics and functional mobility.  Pt and therapist focused on proper form during treatment to ensure optimal strengthening of each targeted muscle group.  Machines were utilized including torso rotation, leg extension, leg curl, chest press, upright row. Tricep extension, bicep curl, leg press, and hip abduction added visit 3          Home Exercises Provided and Patient Education Provided     Education provided:   - Proper completion on exercises provided in HEP     Written Home Exercises Provided: Patient instructed to cont prior HEP.  Exercises were reviewed and Lavell was able to demonstrate them prior to the end of the session.  Lavell demonstrated good  understanding of the education provided.     See EMR under Patient Instructions for exercises provided prior visit.          Assessment   Pt able to complete all components of session with no adverse effects. Reassessed Med X lumbar ROM with patient  able to tolerate increase to 42-0 at  same weight without increased symptoms. Plan on increasing weight 5% next session as tolerated. Reviewed HEP with moderate v/c for recall and performance. Reprinted full HEP for home. Pt advised against wearing back brace at all times as it may be decreasing his muscle strength over time.       Patient is making good progress towards established goals.  Pt will continue to benefit from skilled outpatient physical therapy to address the deficits stated in the impairment chart, provide pt/family education and to maximize pt's level of independence in the home and community environment.     Anticipated Barriers for therapy: Memory deficits, age        Pt's spiritual, cultural and educational needs considered and pt agreeable to plan of care and goals as stated below:         GOALS: Pt is in agreement with the following goals.     Short term goals:  6 weeks or 10 visits   1.  Pt will demonstrate increased lumbar ROM by at least 6 degrees from the initial ROM value with improvements noted in functional ROM and ability to perform Met   2.  Pt will demonstrate increased maximum isometric torque value by 10% when compared to the initial value resulting in improved ability to perform bending, lifting, and carrying activities safely, confidently.     3.  Patient report a reduction in worst pain score by 1-2 points for improved tolerance for walking, standing and exercising.  4.  Pt able to perform HEP correctly with minimal cueing or supervision from therapist to encourage independent management of symptoms.         Long term goals: 13 weeks or 20 visits   1. Pt will demonstrate increased lumbar ROM by at least 8 degrees from initial ROM value, resulting in improved ability to perform functional fwd bending while standing and sitting. Met   2. Pt will demonstrate increased maximum isometric torque value by 14% when compared to the initial value resulting in improved ability to perform bending, lifting, and carrying activities  safely, confidently.  3. Pt to demonstrate ability to independently control and reduce their pain through posture positioning and mechanical movements throughout a typical day.  4.  Pt will demonstrate reduced pain and improved functional outcomes as reported on the FOTO by reaching a score of CJ = at least 20% but < 40% impaired, limited or restricted or less in order to demonstrate subjective improvement in pt's condition.    5. Pt will demonstrate independence with the HEP at discharge  6. Pt will be able to exercise without limitation due to back pain.       Plan   Continue with established Plan of Care towards established PT goals.

## 2019-08-21 ENCOUNTER — CLINICAL SUPPORT (OUTPATIENT)
Dept: REHABILITATION | Facility: OTHER | Age: 84
End: 2019-08-21
Attending: PHYSICIAN ASSISTANT
Payer: MEDICARE

## 2019-08-21 DIAGNOSIS — R29.3 POOR POSTURE: ICD-10-CM

## 2019-08-21 DIAGNOSIS — R29.898 DECREASED STRENGTH OF TRUNK AND BACK: ICD-10-CM

## 2019-08-21 DIAGNOSIS — M25.69 DECREASED RANGE OF MOTION OF TRUNK AND BACK: ICD-10-CM

## 2019-08-21 PROCEDURE — 97110 THERAPEUTIC EXERCISES: CPT | Mod: HCNC

## 2019-08-21 NOTE — PROGRESS NOTES
[facility]      Name: Lavell Coyle  Clinic Number: [id number]    Therapy Diagnosis:   Encounter Diagnoses   Name Primary?    Decreased strength of trunk and back     Decreased range of motion of trunk and back     Poor posture      Physician: Adrianne Hernandez PA-C    Visit Date: 2019    Physician Orders: PT Eval and Treat   Medical Diagnosis from Referral:   M53.9 (ICD-10-CM) - Back disorder     Evaluation Date: 2019  Authorization Period Expiration: 2020  Plan of Care Expiration: 10/16/2019  Reassessment Due: 2019  Visit # / Visits authorized:      Time In: 1:30  Time Out: 2:30  Total Billable Time: 30 minutes     Precautions: spondylolisthesis, osteopenia, hypothyroidism       Pattern of pain determined: 2      Subjective   Lavell reports minimal pain at this time in the low back.       Patient reports tolerating previous visit well, but did experience mild soreness following day.   Patient reports their pain to be 2/10 on a 0-10 scale with 0 being no pain and 10 being the worst pain imaginable.  Pain Location: low back  Occupation: Retired Dentist   Leisure: Exercise and reading     Pts goals: Get back to exercising and get out of pain          Objective     Baseline IM Testing Results:   Date of testin2019  ROM 48-0 deg   Max Peak Torque 133    Min Peak Torque 41    Flex/Ext Ratio 3.21:1   % below normative data 13%            CMS Impairment/Limitation/Restriction for FOTO Survey     Therapist reviewed FOTO scores for Lavell Coyle on 2019.   FOTO documents entered into [employer] - see Media section.     Limitation Score: 52%  Category: Mobility     Current : CK = at least 40% but < 60% impaired, limited or restricted  Goal: CJ = at least 20% but < 40% impaired, limited or restricted  Discharge:              Treatment    Pt was instructed in and performed the following:     Lavell received therapeutic exercises to develop/improved posture,  cardiovascular endurance, muscular endurance, lumbar/cervical ROM, strength and muscular endurance for 50 minutes including the following exercises:     [facility] 8/21/2019   Visit Number 5   VAS Pain Rating 2   Treadmill Time (in min.) 10   Speed -   Time -   Lumbar Stretches - Slouch Overcorrection -   Extension in Lying -   Extension in Standing -   Flexion in Lying 10   Lumbar Weight 60   Repetitions 15   Rating of Perceived Exertion 3   Ice - Z Lie (in min.) 10         PPT x10  --> marching x10   Bridges x10   DKTC x10   LTR 10x       Peripheral muscle strengthening which included 1 set of 15-20 repetitions at a slow, controlled 10-13 second per rep pace focused on strengthening supporting musculature for improved body mechanics and functional mobility.  Pt and therapist focused on proper form during treatment to ensure optimal strengthening of each targeted muscle group.  Machines were utilized including torso rotation, leg extension, leg curl, chest press, upright row. Tricep extension, bicep curl, leg press, and hip abduction added visit 3          Home Exercises Provided and Patient Education Provided     Education provided:   - Proper completion on exercises provided in HEP     Written Home Exercises Provided: Patient instructed to cont prior HEP.  Exercises were reviewed and Lavell was able to demonstrate them prior to the end of the session.  Lavell demonstrated good  understanding of the education provided.     See EMR under Patient Instructions for exercises provided prior visit.          Assessment   Pt able to complete all components of therapy session with no complaints of pain or adverse effects. Pt required moderate verbal cues for exercise progression. Increased weight by 5% this visit and he was able to complete 16 repetitions.        Patient is making good progress towards established goals.  Pt will continue to benefit from skilled outpatient physical therapy to address the deficits  stated in the impairment chart, provide pt/family education and to maximize pt's level of independence in the home and community environment.     Anticipated Barriers for therapy: Memory deficits, age        Pt's spiritual, cultural and educational needs considered and pt agreeable to plan of care and goals as stated below:         GOALS: Pt is in agreement with the following goals.     Short term goals:  6 weeks or 10 visits   1.  Pt will demonstrate increased lumbar ROM by at least 6 degrees from the initial ROM value with improvements noted in functional ROM and ability to perform Met   2.  Pt will demonstrate increased maximum isometric torque value by 10% when compared to the initial value resulting in improved ability to perform bending, lifting, and carrying activities safely, confidently.     3.  Patient report a reduction in worst pain score by 1-2 points for improved tolerance for walking, standing and exercising.  4.  Pt able to perform HEP correctly with minimal cueing or supervision from therapist to encourage independent management of symptoms.         Long term goals: 13 weeks or 20 visits   1. Pt will demonstrate increased lumbar ROM by at least 8 degrees from initial ROM value, resulting in improved ability to perform functional fwd bending while standing and sitting. Met   2. Pt will demonstrate increased maximum isometric torque value by 14% when compared to the initial value resulting in improved ability to perform bending, lifting, and carrying activities safely, confidently.  3. Pt to demonstrate ability to independently control and reduce their pain through posture positioning and mechanical movements throughout a typical day.  4.  Pt will demonstrate reduced pain and improved functional outcomes as reported on the FOTO by reaching a score of CJ = at least 20% but < 40% impaired, limited or restricted or less in order to demonstrate subjective improvement in pt's condition.    5. Pt will  demonstrate independence with the HEP at discharge  6. Pt will be able to exercise without limitation due to back pain.       Plan   Continue with established Plan of Care towards established PT goals.

## 2019-08-26 ENCOUNTER — CLINICAL SUPPORT (OUTPATIENT)
Dept: REHABILITATION | Facility: OTHER | Age: 84
End: 2019-08-26
Attending: PHYSICIAN ASSISTANT
Payer: MEDICARE

## 2019-08-26 DIAGNOSIS — R29.3 POOR POSTURE: ICD-10-CM

## 2019-08-26 DIAGNOSIS — R29.898 DECREASED STRENGTH OF TRUNK AND BACK: ICD-10-CM

## 2019-08-26 DIAGNOSIS — M25.69 DECREASED RANGE OF MOTION OF TRUNK AND BACK: ICD-10-CM

## 2019-08-26 PROCEDURE — 97110 THERAPEUTIC EXERCISES: CPT | Mod: HCNC

## 2019-08-26 NOTE — PROGRESS NOTES
[facility]      Name: Lavell Coyle  Clinic Number: [id number]    Therapy Diagnosis:   Encounter Diagnoses   Name Primary?    Decreased strength of trunk and back     Decreased range of motion of trunk and back     Poor posture      Physician: Adrianne Hernandez PA-C    Visit Date: 2019    Physician Orders: PT Eval and Treat   Medical Diagnosis from Referral:   M53.9 (ICD-10-CM) - Back disorder     Evaluation Date: 2019  Authorization Period Expiration: 2020  Plan of Care Expiration: 10/16/2019  Reassessment Due: 2019  Visit # / Visits authorized:      Time In: 100  Time Out: 200  Total Billable Time: 40 minutes     Precautions: spondylolisthesis, osteopenia, hypothyroidism       Pattern of pain determined: 2      Subjective   Lavell reports experiencing minimal pain over the weekend.        Patient reports tolerating previous visit well, but did experience mild soreness following day.   Patient reports their pain to be 2/10 on a 0-10 scale with 0 being no pain and 10 being the worst pain imaginable.  Pain Location: low back  Occupation: Retired Dentist   Leisure: Exercise and reading     Pts goals: Get back to exercising and get out of pain          Objective     Baseline IM Testing Results:   Date of testin2019  ROM 48-0 deg   Max Peak Torque 133    Min Peak Torque 41    Flex/Ext Ratio 3.21:1   % below normative data 13%            CMS Impairment/Limitation/Restriction for FOTO Survey     Therapist reviewed FOTO scores for Lavell Coyle on 2019.   FOTO documents entered into [employer] - see Media section.     Limitation Score: 52%  Category: Mobility     Current : CK = at least 40% but < 60% impaired, limited or restricted  Goal: CJ = at least 20% but < 40% impaired, limited or restricted  Discharge:              Treatment    Pt was instructed in and performed the following:     Lavell received therapeutic exercises to develop/improved posture,  cardiovascular endurance, muscular endurance, lumbar/cervical ROM, strength and muscular endurance for 50 minutes including the following exercises:   [facility] 8/26/2019   Visit Number 6   VAS Pain Rating 2   Treadmill Time (in min.) -   Speed -   Time 10   Lumbar Stretches - Slouch Overcorrection -   Extension in Lying -   Extension in Standing -   Flexion in Lying 10   Flexion in Sitting -   Lumbar Extension Seat Pad -   Femur Restraint -   Top Dead Center -   Counterweight -   Lumbar Flexion -   Lumbar Extension -   Lumbar Peak Torque -   Min Torque -   Test Percent Below Normative Data -   Lumbar Weight 60   Repetitions 17   Rating of Perceived Exertion 4   Ice - Z Lie (in min.) 10       PPT x10  --> marching x10   Bridges x10   DKTC x10   LTR 10x   Clams RTB x15   Sidelying Hip ABD 10x    Peripheral muscle strengthening which included 1 set of 15-20 repetitions at a slow, controlled 10-13 second per rep pace focused on strengthening supporting musculature for improved body mechanics and functional mobility.  Pt and therapist focused on proper form during treatment to ensure optimal strengthening of each targeted muscle group.  Machines were utilized including torso rotation, leg extension, leg curl, chest press, upright row. Tricep extension, bicep curl, leg press, and hip abduction added visit 3          Home Exercises Provided and Patient Education Provided     Education provided:   - Proper completion on exercises provided in HEP     Written Home Exercises Provided: Patient instructed to cont prior HEP.  Exercises were reviewed and Lavell was able to demonstrate them prior to the end of the session.  Lavell demonstrated good  understanding of the education provided.     See EMR under Patient Instructions for exercises provided prior visit.          Assessment     Pt able to complete all components of session with no adverse effects. Clam shells and hip abduction in sidelying added to further  stabilizing interventions. Increased knee flexion with hip abduction exercises with fatigue. Able to complete 17 repetitions on medx machine today. 2 additional repetitions were completed, but extension was not reached to record rep. Continue cueing pt for appropriate extension.   Patient is making good progress towards established goals.  Pt will continue to benefit from skilled outpatient physical therapy to address the deficits stated in the impairment chart, provide pt/family education and to maximize pt's level of independence in the home and community environment.     Anticipated Barriers for therapy: Memory deficits, age        Pt's spiritual, cultural and educational needs considered and pt agreeable to plan of care and goals as stated below:         GOALS: Pt is in agreement with the following goals.     Short term goals:  6 weeks or 10 visits   1.  Pt will demonstrate increased lumbar ROM by at least 6 degrees from the initial ROM value with improvements noted in functional ROM and ability to perform Met   2.  Pt will demonstrate increased maximum isometric torque value by 10% when compared to the initial value resulting in improved ability to perform bending, lifting, and carrying activities safely, confidently.     3.  Patient report a reduction in worst pain score by 1-2 points for improved tolerance for walking, standing and exercising.  4.  Pt able to perform HEP correctly with minimal cueing or supervision from therapist to encourage independent management of symptoms.         Long term goals: 13 weeks or 20 visits   1. Pt will demonstrate increased lumbar ROM by at least 8 degrees from initial ROM value, resulting in improved ability to perform functional fwd bending while standing and sitting. Met   2. Pt will demonstrate increased maximum isometric torque value by 14% when compared to the initial value resulting in improved ability to perform bending, lifting, and carrying activities safely,  confidently.  3. Pt to demonstrate ability to independently control and reduce their pain through posture positioning and mechanical movements throughout a typical day.  4.  Pt will demonstrate reduced pain and improved functional outcomes as reported on the FOTO by reaching a score of CJ = at least 20% but < 40% impaired, limited or restricted or less in order to demonstrate subjective improvement in pt's condition.    5. Pt will demonstrate independence with the HEP at discharge  6. Pt will be able to exercise without limitation due to back pain.       Plan   Continue with established Plan of Care towards established PT goals.

## 2019-08-28 ENCOUNTER — CLINICAL SUPPORT (OUTPATIENT)
Dept: REHABILITATION | Facility: OTHER | Age: 84
End: 2019-08-28
Attending: PHYSICIAN ASSISTANT
Payer: MEDICARE

## 2019-08-28 DIAGNOSIS — R29.898 DECREASED STRENGTH OF TRUNK AND BACK: ICD-10-CM

## 2019-08-28 DIAGNOSIS — R29.3 POOR POSTURE: ICD-10-CM

## 2019-08-28 DIAGNOSIS — M25.69 DECREASED RANGE OF MOTION OF TRUNK AND BACK: ICD-10-CM

## 2019-08-28 PROCEDURE — 97110 THERAPEUTIC EXERCISES: CPT | Mod: HCNC

## 2019-08-28 NOTE — PROGRESS NOTES
[facility]      Name: Lavell Coyle  Clinic Number: [id number]    Therapy Diagnosis:   Encounter Diagnoses   Name Primary?    Decreased strength of trunk and back     Decreased range of motion of trunk and back     Poor posture      Physician: Adrianne Hernandez PA-C    Visit Date: 2019    Physician Orders: PT Eval and Treat   Medical Diagnosis from Referral:   M53.9 (ICD-10-CM) - Back disorder     Evaluation Date: 2019  Authorization Period Expiration: 2020  Plan of Care Expiration: 10/16/2019  Reassessment Due: 2019  Visit # / Visits authorized:      Time In: 130  Time Out: 230  Total Billable Time: 30 minutes     Precautions: spondylolisthesis, osteopenia, hypothyroidism       Pattern of pain determined: 2      Subjective   Lavell reports very minimal pain at this time      Patient reports tolerating previous visit well, but did experience mild soreness following day.   Patient reports their pain to be 2/10 on a 0-10 scale with 0 being no pain and 10 being the worst pain imaginable.  Pain Location: low back  Occupation: Retired Dentist   Leisure: Exercise and reading     Pts goals: Get back to exercising and get out of pain          Objective     Baseline IM Testing Results:   Date of testin2019  ROM 48-0 deg   Max Peak Torque 133    Min Peak Torque 41    Flex/Ext Ratio 3.21:1   % below normative data 13%            CMS Impairment/Limitation/Restriction for FOTO Survey     Therapist reviewed FOTO scores for Lavell Coyle on 2019.   FOTO documents entered into [employer] - see Media section.     Limitation Score: 52%  Category: Mobility     Current : CK = at least 40% but < 60% impaired, limited or restricted  Goal: CJ = at least 20% but < 40% impaired, limited or restricted  Discharge:              Treatment    Pt was instructed in and performed the following:     Lavell received therapeutic exercises to develop/improved posture, cardiovascular  endurance, muscular endurance, lumbar/cervical ROM, strength and muscular endurance for 50 minutes including the following exercises:     [facility] 8/28/2019   Visit Number 7   VAS Pain Rating 2   Treadmill Time (in min.) 10   Speed -   Time -   Lumbar Stretches - Slouch Overcorrection -   Extension in Lying -   Extension in Standing -   Flexion in Lying 10   Flexion in Sitting -   Lumbar Extension Seat Pad -   Femur Restraint -   Top Dead Center -   Counterweight -   Lumbar Flexion -   Lumbar Extension -   Lumbar Peak Torque -   Min Torque -   Test Percent Below Normative Data -   Lumbar Weight 60   Repetitions 20   Rating of Perceived Exertion 3   Ice - Z Lie (in min.) 10     Exercises completd this visit:  PPT x10  --> marching x10   Bridges x10   DKTC x10   LTR 10x   Sidelying Hip ABD 10x    Exercises completed on previous visits/HEP:    Clams RTB x15     Peripheral muscle strengthening which included 1 set of 15-20 repetitions at a slow, controlled 10-13 second per rep pace focused on strengthening supporting musculature for improved body mechanics and functional mobility.  Pt and therapist focused on proper form during treatment to ensure optimal strengthening of each targeted muscle group.  Machines were utilized including torso rotation, leg extension, leg curl, chest press, upright row. Tricep extension, bicep curl, leg press, and hip abduction added visit 3          Home Exercises Provided and Patient Education Provided     Education provided:   - Proper completion on exercises provided in HEP     Written Home Exercises Provided: Patient instructed to cont prior HEP.  Exercises were reviewed and Lavell was able to demonstrate them prior to the end of the session.  Lavell demonstrated good  understanding of the education provided.     See EMR under Patient Instructions for exercises provided prior visit.          Assessment     Pt able to complete all components of session with no adverse effects,  continued with exercises and pt is able to complete them with minimal cues for progression and form. Pt was able to progress to 20 repetitions on the lumbar med x and will progress by 5% next visit.    Patient is making good progress towards established goals.  Pt will continue to benefit from skilled outpatient physical therapy to address the deficits stated in the impairment chart, provide pt/family education and to maximize pt's level of independence in the home and community environment.     Anticipated Barriers for therapy: Memory deficits, age        Pt's spiritual, cultural and educational needs considered and pt agreeable to plan of care and goals as stated below:         GOALS: Pt is in agreement with the following goals.     Short term goals:  6 weeks or 10 visits   1.  Pt will demonstrate increased lumbar ROM by at least 6 degrees from the initial ROM value with improvements noted in functional ROM and ability to perform Met   2.  Pt will demonstrate increased maximum isometric torque value by 10% when compared to the initial value resulting in improved ability to perform bending, lifting, and carrying activities safely, confidently.     3.  Patient report a reduction in worst pain score by 1-2 points for improved tolerance for walking, standing and exercising.  4.  Pt able to perform HEP correctly with minimal cueing or supervision from therapist to encourage independent management of symptoms.         Long term goals: 13 weeks or 20 visits   1. Pt will demonstrate increased lumbar ROM by at least 8 degrees from initial ROM value, resulting in improved ability to perform functional fwd bending while standing and sitting. Met   2. Pt will demonstrate increased maximum isometric torque value by 14% when compared to the initial value resulting in improved ability to perform bending, lifting, and carrying activities safely, confidently.  3. Pt to demonstrate ability to independently control and reduce their  pain through posture positioning and mechanical movements throughout a typical day.  4.  Pt will demonstrate reduced pain and improved functional outcomes as reported on the FOTO by reaching a score of CJ = at least 20% but < 40% impaired, limited or restricted or less in order to demonstrate subjective improvement in pt's condition.    5. Pt will demonstrate independence with the HEP at discharge  6. Pt will be able to exercise without limitation due to back pain.       Plan   Continue with established Plan of Care towards established PT goals.

## 2019-08-29 ENCOUNTER — PES CALL (OUTPATIENT)
Dept: ADMINISTRATIVE | Facility: CLINIC | Age: 84
End: 2019-08-29

## 2019-09-03 ENCOUNTER — TELEPHONE (OUTPATIENT)
Dept: INTERNAL MEDICINE | Facility: CLINIC | Age: 84
End: 2019-09-03

## 2019-09-03 NOTE — TELEPHONE ENCOUNTER
----- Message from Aliya Donahue sent at 9/3/2019  3:29 PM CDT -----  Contact: Jeremias [phone]  Patient has been having pains in the lower back.  Requesting referral.    Please call and advise  Thank you

## 2019-09-05 ENCOUNTER — CLINICAL SUPPORT (OUTPATIENT)
Dept: REHABILITATION | Facility: OTHER | Age: 84
End: 2019-09-05
Attending: PHYSICIAN ASSISTANT
Payer: MEDICARE

## 2019-09-05 ENCOUNTER — TELEPHONE (OUTPATIENT)
Dept: INTERNAL MEDICINE | Facility: CLINIC | Age: 84
End: 2019-09-05

## 2019-09-05 DIAGNOSIS — R29.898 DECREASED STRENGTH OF TRUNK AND BACK: ICD-10-CM

## 2019-09-05 DIAGNOSIS — R29.3 POOR POSTURE: ICD-10-CM

## 2019-09-05 DIAGNOSIS — M25.69 DECREASED RANGE OF MOTION OF TRUNK AND BACK: ICD-10-CM

## 2019-09-05 PROCEDURE — 97110 THERAPEUTIC EXERCISES: CPT | Mod: HCNC

## 2019-09-05 NOTE — TELEPHONE ENCOUNTER
----- Message from Ijeoma Mendez sent at 9/5/2019  3:54 PM CDT -----  Contact: Pt self Mobile [phone]  Patient is calling in regards to him saying that he was referred to see a back and spine specialist and the advice that they gave him he said makes him feel worse.

## 2019-09-05 NOTE — PROGRESS NOTES
[facility]      Name: Lavell Coyle  Clinic Number: [id number]    Therapy Diagnosis:   Encounter Diagnoses   Name Primary?    Decreased strength of trunk and back     Decreased range of motion of trunk and back     Poor posture      Physician: Adrianne Henrandez PA-C    Visit Date: 2019    Physician Orders: PT Eval and Treat   Medical Diagnosis from Referral:   M53.9 (ICD-10-CM) - Back disorder     Evaluation Date: 2019  Authorization Period Expiration: 2020  Plan of Care Expiration: 10/16/2019  Reassessment Due: 2019  Visit # / Visits authorized:      Time In: 130  Time Out: 230  Total Billable Time: 30 minutes     Precautions: spondylolisthesis, osteopenia, hypothyroidism       Pattern of pain determined: 2      Subjective   Lavell reports increased LBP today. Pt wearing a back brace today. Pt stated he can barely walk.     Patient reports tolerating previous visit well, but did experience mild soreness following day.   Patient reports their pain to be 8/10 on a 0-10 scale with 0 being no pain and 10 being the worst pain imaginable.  Pain Location: low back  Occupation: Retired Dentist   Leisure: Exercise and reading     Pts goals: Get back to exercising and get out of pain          Objective     Baseline IM Testing Results:   Date of testin2019  ROM 48-0 deg   Max Peak Torque 133    Min Peak Torque 41    Flex/Ext Ratio 3.21:1   % below normative data 13%            CMS Impairment/Limitation/Restriction for FOTO Survey     Therapist reviewed FOTO scores for Lavell Coyle on 2019.   FOTO documents entered into [facility] - see Media section.     Limitation Score: 52%  Category: Mobility     Current : CK = at least 40% but < 60% impaired, limited or restricted  Goal: CJ = at least 20% but < 40% impaired, limited or restricted  Discharge:              Treatment    Pt was instructed in and performed the following:     Lavell received therapeutic  exercises to develop/improved posture, cardiovascular endurance, muscular endurance, lumbar/cervical ROM, strength and muscular endurance for 50 minutes including the following exercises:     [facility] 9/5/2019   Visit Number 8   VAS Pain Rating 8   Treadmill Time (in min.) -   Speed -   Recumbent Bike Seat Pos. 5   Time 9   Lumbar Weight 60   Repetitions 10   Rating of Perceived Exertion 5   Ice - Z Lie (in min.) 10     Exercises completd this visit:  PPT x10  --> marching x10 (NT)  Bridges x10 (NT)  DKTC x10   LTR 10x   Sidelying Hip ABD 10x  Piriformis stretch 15 sec 3x    Exercises completed on previous visits/HEP:    Clams RTB x15     Manual: R LB STM for 5 mins.    Peripheral muscle strengthening which included 1 set of 15-20 repetitions at a slow, controlled 10-13 second per rep pace focused on strengthening supporting musculature for improved body mechanics and functional mobility.  Pt and therapist focused on proper form during treatment to ensure optimal strengthening of each targeted muscle group.  Machines were utilized including torso rotation, leg extension, leg curl, chest press, upright row. Tricep extension, bicep curl, leg press, and hip abduction added visit 3          Home Exercises Provided and Patient Education Provided     Education provided:   - Proper completion on exercises provided in HEP     Written Home Exercises Provided: Patient instructed to cont prior HEP.  Exercises were reviewed and Lavell was able to demonstrate them prior to the end of the session.  Lavell demonstrated good  understanding of the education provided.     See EMR under Patient Instructions for exercises provided prior visit.          Assessment     Pt with increased  R side LB pain today that stayed the same with session. Added piriformis stretch  Pt needed increased vc to slow down for his HEP. Pt was only able to do 10 repetitions on the lumbar med x due to pain and did not increase weight today due to  pain. Gave pt clinics RW to use in clinic due to pt c/o R side LB pain and unable to stay upright. He may purchase one. Pt also received STM to R LB with no improvement. Educated pt on importance of use a lumbar roll and to cont to stretch 2x daily and ice. He understood.     Patient is making good progress towards established goals.  Pt will continue to benefit from skilled outpatient physical therapy to address the deficits stated in the impairment chart, provide pt/family education and to maximize pt's level of independence in the home and community environment.     Anticipated Barriers for therapy: Memory deficits, age        Pt's spiritual, cultural and educational needs considered and pt agreeable to plan of care and goals as stated below:         GOALS: Pt is in agreement with the following goals.     Short term goals:  6 weeks or 10 visits   1.  Pt will demonstrate increased lumbar ROM by at least 6 degrees from the initial ROM value with improvements noted in functional ROM and ability to perform Met   2.  Pt will demonstrate increased maximum isometric torque value by 10% when compared to the initial value resulting in improved ability to perform bending, lifting, and carrying activities safely, confidently.     3.  Patient report a reduction in worst pain score by 1-2 points for improved tolerance for walking, standing and exercising.  4.  Pt able to perform HEP correctly with minimal cueing or supervision from therapist to encourage independent management of symptoms.         Long term goals: 13 weeks or 20 visits   1. Pt will demonstrate increased lumbar ROM by at least 8 degrees from initial ROM value, resulting in improved ability to perform functional fwd bending while standing and sitting. Met   2. Pt will demonstrate increased maximum isometric torque value by 14% when compared to the initial value resulting in improved ability to perform bending, lifting, and carrying activities safely,  confidently.  3. Pt to demonstrate ability to independently control and reduce their pain through posture positioning and mechanical movements throughout a typical day.  4.  Pt will demonstrate reduced pain and improved functional outcomes as reported on the FOTO by reaching a score of CJ = at least 20% but < 40% impaired, limited or restricted or less in order to demonstrate subjective improvement in pt's condition.    5. Pt will demonstrate independence with the HEP at discharge  6. Pt will be able to exercise without limitation due to back pain.       Plan   Continue with established Plan of Care towards established PT goals.

## 2019-09-06 NOTE — LETTER
June 21, 2018      Rosalva Howard MD  [address]  [facility] [address]           [facility] - Endo/Diab/[facility]ab  [address]  [facility] [address]  Phone: [phone]  Fax: [phone]          Patient: Lavell Coyle   MR Number: [id number]   YOB: 1932   Date of Visit: 6/20/2018       Dear Dr. Rosalva Howard:    Thank you for referring Lavell Coyle to me for evaluation. Attached you will find relevant portions of my assessment and plan of care.    If you have questions, please do not hesitate to call me. I look forward to following Lavell Coyle along with you.    Sincerely,    Sravanthi Sal MD    Enclosure  CC:  No Recipients    If you would like to receive this communication electronically, please contact [email or web address] or [phone] to request more information on [employer] Link access.    For providers and/or their staff who would like to refer a patient to [facility], please contact us through our one-stop-shop provider referral line, [facility], at [phone].    If you feel you have received this communication in error or would no longer like to receive these types of communications, please e-mail [email or web address]         
(4) rarely moist

## 2019-09-09 ENCOUNTER — TELEPHONE (OUTPATIENT)
Dept: INTERNAL MEDICINE | Facility: CLINIC | Age: 84
End: 2019-09-09

## 2019-09-09 NOTE — TELEPHONE ENCOUNTER
----- Message from Roxann Reynoso sent at 9/9/2019  8:15 AM CDT -----  Contact: Self Call  [phone]  Patient refused to be seen by anyone but Dr. Howard for his back pain. He want the Nurse or MA to call home with a sooner appointment.

## 2019-09-11 ENCOUNTER — OFFICE VISIT (OUTPATIENT)
Dept: INTERNAL MEDICINE | Facility: CLINIC | Age: 84
End: 2019-09-11
Payer: MEDICARE

## 2019-09-11 VITALS
DIASTOLIC BLOOD PRESSURE: 70 MMHG | HEIGHT: 68 IN | HEART RATE: 66 BPM | BODY MASS INDEX: 23.05 KG/M2 | SYSTOLIC BLOOD PRESSURE: 122 MMHG | OXYGEN SATURATION: 99 % | WEIGHT: 152.13 LBS

## 2019-09-11 DIAGNOSIS — M54.50 ACUTE LEFT-SIDED LOW BACK PAIN WITHOUT SCIATICA: ICD-10-CM

## 2019-09-11 DIAGNOSIS — M25.69 DECREASED RANGE OF MOTION OF TRUNK AND BACK: Primary | ICD-10-CM

## 2019-09-11 PROCEDURE — 99213 PR OFFICE/OUTPT VISIT, EST, LEVL III, 20-29 MIN: ICD-10-PCS | Mod: HCNC,S$GLB,, | Performed by: INTERNAL MEDICINE

## 2019-09-11 PROCEDURE — 99999 PR PBB SHADOW E&M-EST. PATIENT-LVL IV: CPT | Mod: PBBFAC,HCNC,, | Performed by: INTERNAL MEDICINE

## 2019-09-11 PROCEDURE — 1101F PT FALLS ASSESS-DOCD LE1/YR: CPT | Mod: HCNC,CPTII,S$GLB, | Performed by: INTERNAL MEDICINE

## 2019-09-11 PROCEDURE — 99999 PR PBB SHADOW E&M-EST. PATIENT-LVL IV: ICD-10-PCS | Mod: PBBFAC,HCNC,, | Performed by: INTERNAL MEDICINE

## 2019-09-11 PROCEDURE — 99213 OFFICE O/P EST LOW 20 MIN: CPT | Mod: HCNC,S$GLB,, | Performed by: INTERNAL MEDICINE

## 2019-09-11 PROCEDURE — 1101F PR PT FALLS ASSESS DOC 0-1 FALLS W/OUT INJ PAST YR: ICD-10-PCS | Mod: HCNC,CPTII,S$GLB, | Performed by: INTERNAL MEDICINE

## 2019-09-11 NOTE — PROGRESS NOTES
Subjective:      Patient ID: Lavell Coyle is a 87 y.o. male.    Chief Complaint: Back Pain    HPI:  HPI   Patient is a retired oral surgeon.  Is been diagnosed with MCI which is advancing.  He asks about his back and whether I am for milieu with the diagnosis.  I have told him that we have discussed this in the past and I have written this down for him and will do so again today.  I also gave him a copy of the orthopedic surgeon's note with the recommendation for a trial of an epidural steroid.  I had noted that the appointment made and pain clinic was canceled and I told him that we should reschedule a pain clinic appointment.  I would like him to keep the note I have given him in his wallet and I have told him that it would be easier for him to review his diagnosis and what should be done if his memory fails him.  Patient Active Problem List   Diagnosis    BPH with urinary obstruction    Thyroid nodule    Age-related osteoporosis with current pathological fracture with routine healing    Skin cancer    History of melanoma excision    Basal cell cancer    MCI (mild cognitive impairment) with memory loss    Hypothyroidism    Medial meniscus tear    S/P R knee surgery, medial/lateral menisectomy, loose body removal    Range of motion deficit    Decreased strength    Nuclear cataract    Ocular migraine    Blepharitis of both eyes    Bilateral dry eyes    Memory loss    Tortuous aorta    Meningiomas, multiple    History of vertebral compression fracture    Acute left-sided low back pain without sciatica    Decreased strength of trunk and back    Decreased range of motion of trunk and back    Poor posture     Past Medical History:   Diagnosis Date    Allergic blepharitis 5/20/2014    Basal cell cancer     History of mononucleosis     pupura    History of osteopenia     Hypothyroidism     Melanoma     Osteopenia     Prostate hypertrophy     Thrombocytopenia 2002    Thrombocytopenia  "2002    Thyroid disease     Thyroid nodule 07/2009    left nodule     Past Surgical History:   Procedure Laterality Date    ARTHROSCOPY, KNEE Right 4/23/2014    [facility]    CARPAL TUNNEL RELEASE      left    CHONDRECTOMY, KNEE, SEMILUNAR CARTILAGE Right 4/23/2014    [facility]    KNEE ARTHROSCOPY      KNEE SURGERY      Moh      surgery    ROTATOR CUFF REPAIR      right    SKIN CANCER EXCISION       Family History   Problem Relation Age of Onset    Cancer Other     Alcohol abuse Father     Prostate cancer Neg Hx     Kidney disease Neg Hx      Review of Systems   Constitutional: Negative for activity change, chills, fever and unexpected weight change.   Respiratory: Negative for cough, chest tightness, shortness of breath and wheezing.    Cardiovascular: Negative for chest pain, palpitations and leg swelling.   Musculoskeletal: Positive for back pain.     Objective:     Vitals:    09/11/19 1308   BP: 122/70   Pulse: 66   SpO2: 99%   Weight: 69 kg (152 lb 1.9 oz)   Height: 5' 8" (1.727 m)   PainSc:   5     Body mass index is 23.13 kg/m².  Physical Exam   Constitutional: He is oriented to person, place, and time. He appears well-developed and well-nourished. No distress.   Neck: Carotid bruit is not present. No thyromegaly present.   Cardiovascular: Normal rate, regular rhythm and normal heart sounds. PMI is not displaced.   Pulmonary/Chest: Effort normal and breath sounds normal. No respiratory distress.   Abdominal: Soft. Bowel sounds are normal. He exhibits no distension. There is no tenderness.   Musculoskeletal: He exhibits no edema.   Neurological: He is alert and oriented to person, place, and time.     Assessment:     1. Decreased range of motion of trunk and back    2. Acute left-sided low back pain without sciatica      Plan:   Lavell was seen today for back pain.    Diagnoses and all orders for this visit:    Decreased " range of motion of trunk and back  Comments:  Per the recommendation of Dr. Esqueda trial of conservative management : please see his note.  Orders:  -     Ambulatory consult to Pain Clinic    Acute left-sided low back pain without sciatica  Comments:  Per the recommendation of Dr. Esqueda trial of conservative management : please see his note.  Orders:  -     Ambulatory consult to Pain Clinic        Problem List Items Addressed This Visit        L side LBP    Acute left-sided low back pain without sciatica    Relevant Orders    Ambulatory consult to Pain Clinic       Back disorder     Decreased range of motion of trunk and back - Primary    Relevant Orders    Ambulatory consult to Pain Clinic        Orders Placed This Encounter   Procedures    Ambulatory consult to Pain Clinic     Referral Priority:   Routine     Referral Type:   Consultation     Referral Reason:   Specialty Services Required     Referred to Provider:   Eddie Thomason MD     Requested Specialty:   Pain Medicine     Number of Visits Requested:   1     No follow-ups on file.     Medication List           Accurate as of 9/11/19  4:41 PM. If you have any questions, ask your nurse or doctor.               CONTINUE taking these medications    calcium citrate-vitamin D3 315-200 mg 315-200 mg-unit per tablet  Commonly known as:  CITRACAL+D     celecoxib 200 MG capsule  Commonly known as:  CeleBREX  TAKE 1 CAPSULE(200 MG) BY MOUTH TWICE DAILY     ibuprofen 200 MG tablet  Commonly known as:  ADVIL,MOTRIN     levothyroxine 50 MCG tablet  Commonly known as:  SYNTHROID  TAKE 1 TABLET BY MOUTH EVERY DAY     MULTIVITAMIN ORAL     OMEGA-3 ORAL     ROZEREM 8 mg tablet  Generic drug:  ramelteon  TAKE 1 TABLET(8 MG) BY MOUTH EVERY EVENING     saw palmetto 160 MG capsule

## 2019-09-12 ENCOUNTER — OFFICE VISIT (OUTPATIENT)
Dept: INTERNAL MEDICINE | Facility: CLINIC | Age: 84
End: 2019-09-12
Payer: MEDICARE

## 2019-09-12 VITALS
SYSTOLIC BLOOD PRESSURE: 102 MMHG | WEIGHT: 150.81 LBS | HEART RATE: 78 BPM | BODY MASS INDEX: 22.86 KG/M2 | DIASTOLIC BLOOD PRESSURE: 64 MMHG | HEIGHT: 68 IN

## 2019-09-12 DIAGNOSIS — M54.16 LUMBAR RADICULOPATHY: Primary | ICD-10-CM

## 2019-09-12 PROCEDURE — 1101F PR PT FALLS ASSESS DOC 0-1 FALLS W/OUT INJ PAST YR: ICD-10-PCS | Mod: HCNC,CPTII,S$GLB, | Performed by: INTERNAL MEDICINE

## 2019-09-12 PROCEDURE — 99213 PR OFFICE/OUTPT VISIT, EST, LEVL III, 20-29 MIN: ICD-10-PCS | Mod: HCNC,S$GLB,, | Performed by: INTERNAL MEDICINE

## 2019-09-12 PROCEDURE — 99213 OFFICE O/P EST LOW 20 MIN: CPT | Mod: HCNC,S$GLB,, | Performed by: INTERNAL MEDICINE

## 2019-09-12 PROCEDURE — 99999 PR PBB SHADOW E&M-EST. PATIENT-LVL III: ICD-10-PCS | Mod: PBBFAC,HCNC,, | Performed by: INTERNAL MEDICINE

## 2019-09-12 PROCEDURE — 1101F PT FALLS ASSESS-DOCD LE1/YR: CPT | Mod: HCNC,CPTII,S$GLB, | Performed by: INTERNAL MEDICINE

## 2019-09-12 PROCEDURE — 99999 PR PBB SHADOW E&M-EST. PATIENT-LVL III: CPT | Mod: PBBFAC,HCNC,, | Performed by: INTERNAL MEDICINE

## 2019-09-12 NOTE — Clinical Note
Dominick Coyle came to see me today with a new pain in his right lateral sacral area.  He has not heard from any one about the MCKAYLA.  I can't figure out from the chart what the problem is.  Can you help? Or at least tell me what to do.  [email or web address]

## 2019-09-12 NOTE — PROGRESS NOTES
"Subjective:       Patient ID: Lavell Coyle is a 87 y.o. male.    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 Patient has very poor recall  Has known lumbar radiculopathy for which he primarily takes ibuprofen     Review of Systems   Constitutional: Negative for activity change, appetite change and fever.   HENT: Negative for congestion, postnasal drip and sore throat.    Respiratory: Negative for cough, shortness of breath and wheezing.    Cardiovascular: Negative for chest pain and palpitations.   Gastrointestinal: Negative for abdominal pain, blood in stool, constipation, diarrhea, nausea and vomiting.   Genitourinary: Negative for decreased urine volume, difficulty urinating, flank pain and frequency.   Musculoskeletal: Negative for arthralgias.   Neurological: Negative for dizziness, weakness and headaches.       Objective:      Physical Exam   Constitutional: He is oriented to person, place, and time. He appears well-developed and well-nourished. No distress.       HENT:   Head: Normocephalic and atraumatic.   Right Ear: External ear normal.   Left Ear: External ear normal.   Eyes: Pupils are equal, round, and reactive to light. Conjunctivae and EOM are normal.   Neck: Normal range of motion. Neck supple. No thyromegaly present.   Cardiovascular: Normal rate and regular rhythm.   Pulmonary/Chest: Effort normal and breath sounds normal.   Abdominal: Soft. Bowel sounds are normal. He exhibits no mass. There is no tenderness. There is no rebound and no guarding.   Musculoskeletal: Normal range of motion.        Legs:  Lymphadenopathy:     He has no cervical adenopathy.   Neurological: He is alert and oriented to person, place, and time. He has normal reflexes. He displays normal reflexes. No cranial nerve deficit. He exhibits normal muscle tone. Coordination normal.   Skin: " Skin is warm and dry.       Assessment:       1. Lumbar radiculopathy        Plan:   Lavell was seen today for hip pain.    Diagnoses and all orders for this visit:    Lumbar radiculopathy

## 2019-09-18 ENCOUNTER — PATIENT OUTREACH (OUTPATIENT)
Dept: ADMINISTRATIVE | Facility: OTHER | Age: 84
End: 2019-09-18

## 2019-09-30 ENCOUNTER — TELEPHONE (OUTPATIENT)
Dept: ORTHOPEDICS | Facility: CLINIC | Age: 84
End: 2019-09-30

## 2019-09-30 NOTE — TELEPHONE ENCOUNTER
Left message for pt advising that Dr. Esqueda is out of town, so I am unable to get him in today. I advised that the PA had an appt available on Friday, October 4th,so I am holding that for him. I advised that if he needs to cancel or reschedule he can call us at clinic.

## 2019-10-01 ENCOUNTER — LAB VISIT (OUTPATIENT)
Dept: LAB | Facility: OTHER | Age: 84
End: 2019-10-01
Payer: MEDICARE

## 2019-10-01 DIAGNOSIS — E03.9 HYPOTHYROIDISM, UNSPECIFIED TYPE: ICD-10-CM

## 2019-10-01 DIAGNOSIS — E55.9 MILD VITAMIN D DEFICIENCY: ICD-10-CM

## 2019-10-01 LAB
25(OH)D3+25(OH)D2 SERPL-MCNC: 26 NG/ML (ref 30–96)
ALBUMIN SERPL BCP-MCNC: 3.4 G/DL (ref 3.5–5.2)
ALP SERPL-CCNC: 68 U/L (ref 55–135)
ALT SERPL W/O P-5'-P-CCNC: 11 U/L (ref 10–44)
ANION GAP SERPL CALC-SCNC: 8 MMOL/L (ref 8–16)
AST SERPL-CCNC: 20 U/L (ref 10–40)
BASOPHILS # BLD AUTO: 0.08 K/UL (ref 0–0.2)
BASOPHILS NFR BLD: 1 % (ref 0–1.9)
BILIRUB SERPL-MCNC: 0.4 MG/DL (ref 0.1–1)
BUN SERPL-MCNC: 20 MG/DL (ref 8–23)
CALCIUM SERPL-MCNC: 9.2 MG/DL (ref 8.7–10.5)
CHLORIDE SERPL-SCNC: 105 MMOL/L (ref 95–110)
CO2 SERPL-SCNC: 26 MMOL/L (ref 23–29)
CREAT SERPL-MCNC: 1.1 MG/DL (ref 0.5–1.4)
DIFFERENTIAL METHOD: ABNORMAL
EOSINOPHIL # BLD AUTO: 0.3 K/UL (ref 0–0.5)
EOSINOPHIL NFR BLD: 4 % (ref 0–8)
ERYTHROCYTE [DISTWIDTH] IN BLOOD BY AUTOMATED COUNT: 15.1 % (ref 11.5–14.5)
EST. GFR  (AFRICAN AMERICAN): >60 ML/MIN/1.73 M^2
EST. GFR  (NON AFRICAN AMERICAN): >60 ML/MIN/1.73 M^2
GLUCOSE SERPL-MCNC: 93 MG/DL (ref 70–110)
HCT VFR BLD AUTO: 39.8 % (ref 40–54)
HGB BLD-MCNC: 12.9 G/DL (ref 14–18)
IMM GRANULOCYTES # BLD AUTO: 0.08 K/UL (ref 0–0.04)
IMM GRANULOCYTES NFR BLD AUTO: 1 % (ref 0–0.5)
LYMPHOCYTES # BLD AUTO: 1.4 K/UL (ref 1–4.8)
LYMPHOCYTES NFR BLD: 16.8 % (ref 18–48)
MCH RBC QN AUTO: 27.9 PG (ref 27–31)
MCHC RBC AUTO-ENTMCNC: 32.4 G/DL (ref 32–36)
MCV RBC AUTO: 86 FL (ref 82–98)
MONOCYTES # BLD AUTO: 0.8 K/UL (ref 0.3–1)
MONOCYTES NFR BLD: 10.1 % (ref 4–15)
NEUTROPHILS # BLD AUTO: 5.5 K/UL (ref 1.8–7.7)
NEUTROPHILS NFR BLD: 67.1 % (ref 38–73)
NRBC BLD-RTO: 0 /100 WBC
PLATELET # BLD AUTO: 233 K/UL (ref 150–350)
PMV BLD AUTO: 10.1 FL (ref 9.2–12.9)
POTASSIUM SERPL-SCNC: 4.4 MMOL/L (ref 3.5–5.1)
PROT SERPL-MCNC: 6.6 G/DL (ref 6–8.4)
RBC # BLD AUTO: 4.63 M/UL (ref 4.6–6.2)
SODIUM SERPL-SCNC: 139 MMOL/L (ref 136–145)
TSH SERPL DL<=0.005 MIU/L-ACNC: 2.82 UIU/ML (ref 0.4–4)
WBC # BLD AUTO: 8.23 K/UL (ref 3.9–12.7)

## 2019-10-01 PROCEDURE — 82306 VITAMIN D 25 HYDROXY: CPT | Mod: HCNC

## 2019-10-01 PROCEDURE — 85025 COMPLETE CBC W/AUTO DIFF WBC: CPT | Mod: HCNC

## 2019-10-01 PROCEDURE — 80053 COMPREHEN METABOLIC PANEL: CPT | Mod: HCNC

## 2019-10-01 PROCEDURE — 84443 ASSAY THYROID STIM HORMONE: CPT | Mod: HCNC

## 2019-10-01 PROCEDURE — 36415 COLL VENOUS BLD VENIPUNCTURE: CPT | Mod: HCNC

## 2019-10-02 ENCOUNTER — PATIENT OUTREACH (OUTPATIENT)
Dept: ADMINISTRATIVE | Facility: OTHER | Age: 84
End: 2019-10-02

## 2019-10-02 ENCOUNTER — TELEPHONE (OUTPATIENT)
Dept: ORTHOPEDICS | Facility: CLINIC | Age: 84
End: 2019-10-02

## 2019-10-02 DIAGNOSIS — M51.36 DDD (DEGENERATIVE DISC DISEASE), LUMBAR: Primary | ICD-10-CM

## 2019-10-04 ENCOUNTER — HOSPITAL ENCOUNTER (OUTPATIENT)
Dept: RADIOLOGY | Facility: HOSPITAL | Age: 84
Discharge: HOME | End: 2019-10-04
Attending: PHYSICIAN ASSISTANT
Payer: MEDICARE

## 2019-10-04 ENCOUNTER — OFFICE VISIT (OUTPATIENT)
Dept: ORTHOPEDICS | Facility: CLINIC | Age: 84
End: 2019-10-04
Payer: MEDICARE

## 2019-10-04 VITALS — WEIGHT: 147.5 LBS | BODY MASS INDEX: 22.35 KG/M2 | HEIGHT: 68 IN

## 2019-10-04 DIAGNOSIS — M54.16 LUMBAR RADICULOPATHY: Primary | ICD-10-CM

## 2019-10-04 DIAGNOSIS — M51.36 DDD (DEGENERATIVE DISC DISEASE), LUMBAR: ICD-10-CM

## 2019-10-04 PROCEDURE — 72120 XR LUMBAR SPINE AP AND LAT WITH FLEX/EXT: ICD-10-PCS | Mod: 26,HCNC,, | Performed by: RADIOLOGY

## 2019-10-04 PROCEDURE — 72120 X-RAY BEND ONLY L-S SPINE: CPT | Mod: 26,HCNC,, | Performed by: RADIOLOGY

## 2019-10-04 PROCEDURE — 72100 XR LUMBAR SPINE AP AND LAT WITH FLEX/EXT: ICD-10-PCS | Mod: 26,HCNC,, | Performed by: RADIOLOGY

## 2019-10-04 PROCEDURE — 1101F PR PT FALLS ASSESS DOC 0-1 FALLS W/OUT INJ PAST YR: ICD-10-PCS | Mod: HCNC,CPTII,S$GLB, | Performed by: ORTHOPAEDIC SURGERY

## 2019-10-04 PROCEDURE — 99213 OFFICE O/P EST LOW 20 MIN: CPT | Mod: HCNC,S$GLB,, | Performed by: ORTHOPAEDIC SURGERY

## 2019-10-04 PROCEDURE — 99213 PR OFFICE/OUTPT VISIT, EST, LEVL III, 20-29 MIN: ICD-10-PCS | Mod: HCNC,S$GLB,, | Performed by: ORTHOPAEDIC SURGERY

## 2019-10-04 PROCEDURE — 1101F PT FALLS ASSESS-DOCD LE1/YR: CPT | Mod: HCNC,CPTII,S$GLB, | Performed by: ORTHOPAEDIC SURGERY

## 2019-10-04 PROCEDURE — 99999 PR PBB SHADOW E&M-EST. PATIENT-LVL III: ICD-10-PCS | Mod: PBBFAC,HCNC,, | Performed by: ORTHOPAEDIC SURGERY

## 2019-10-04 PROCEDURE — 72120 X-RAY BEND ONLY L-S SPINE: CPT | Mod: TC,HCNC

## 2019-10-04 PROCEDURE — 99999 PR PBB SHADOW E&M-EST. PATIENT-LVL III: CPT | Mod: PBBFAC,HCNC,, | Performed by: ORTHOPAEDIC SURGERY

## 2019-10-04 PROCEDURE — 72100 X-RAY EXAM L-S SPINE 2/3 VWS: CPT | Mod: 26,HCNC,, | Performed by: RADIOLOGY

## 2019-10-04 NOTE — PROGRESS NOTES
The patient returns for follow-up.  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  He has a history of low back pain and left greater than right lower extremity radiculopathy. He states that he has not had any recent radicular pain into the legs. His pain remains in the low back at midline. He wears a brace for support which he states helps the pain. He exercises regularly but is unable to do exercise machines that require standing.     His back pain is so bad that at some points he uses a wheelchair to get around long distances.  This has been progressively worse over the past year.    On physical examination there are no focal motor sensory deficits and symmetric bilateral lower extremity reflexes.    Radiographic imaging demonstrates a L2-3 retrolisthesis and a large free fragment at the L2-3 interspace.  I am also suspicious for a facet cyst on the left at L2-3 as well.    Today we discussed options, he has a predominance of back pain associated with retrolisthesis and a large lumbar disc herniation as well as possible facet cyst. Will order MCKAYLA and see patient back in 6 weeks    I spent 15 minutes with the patient of which greater than 1/2 the time was devoted to counciling the patient regarding treatment options.

## 2019-10-07 ENCOUNTER — OFFICE VISIT (OUTPATIENT)
Dept: INTERNAL MEDICINE | Facility: CLINIC | Age: 84
End: 2019-10-07
Payer: MEDICARE

## 2019-10-07 VITALS
DIASTOLIC BLOOD PRESSURE: 70 MMHG | HEIGHT: 68 IN | SYSTOLIC BLOOD PRESSURE: 110 MMHG | HEART RATE: 75 BPM | WEIGHT: 145.75 LBS | OXYGEN SATURATION: 97 % | BODY MASS INDEX: 22.09 KG/M2

## 2019-10-07 DIAGNOSIS — M51.26 LUMBAR DISC HERNIATION: Primary | ICD-10-CM

## 2019-10-07 DIAGNOSIS — G31.84 MCI (MILD COGNITIVE IMPAIRMENT) WITH MEMORY LOSS: ICD-10-CM

## 2019-10-07 DIAGNOSIS — E03.9 HYPOTHYROIDISM, UNSPECIFIED TYPE: ICD-10-CM

## 2019-10-07 PROCEDURE — 99999 PR PBB SHADOW E&M-EST. PATIENT-LVL III: CPT | Mod: PBBFAC,HCNC,, | Performed by: INTERNAL MEDICINE

## 2019-10-07 PROCEDURE — 99999 PR PBB SHADOW E&M-EST. PATIENT-LVL III: ICD-10-PCS | Mod: PBBFAC,HCNC,, | Performed by: INTERNAL MEDICINE

## 2019-10-07 PROCEDURE — 99214 PR OFFICE/OUTPT VISIT, EST, LEVL IV, 30-39 MIN: ICD-10-PCS | Mod: HCNC,S$GLB,, | Performed by: INTERNAL MEDICINE

## 2019-10-07 PROCEDURE — 1101F PT FALLS ASSESS-DOCD LE1/YR: CPT | Mod: HCNC,CPTII,S$GLB, | Performed by: INTERNAL MEDICINE

## 2019-10-07 PROCEDURE — 1101F PR PT FALLS ASSESS DOC 0-1 FALLS W/OUT INJ PAST YR: ICD-10-PCS | Mod: HCNC,CPTII,S$GLB, | Performed by: INTERNAL MEDICINE

## 2019-10-07 PROCEDURE — 99214 OFFICE O/P EST MOD 30 MIN: CPT | Mod: HCNC,S$GLB,, | Performed by: INTERNAL MEDICINE

## 2019-10-07 NOTE — PROGRESS NOTES
Subjective:      Patient ID: Lavell Coyle is a 87 y.o. male.    Chief Complaint: Follow-up (Back pain)    HPI:  HPI   Patient again asks what his diagnosis and plan is for his back. I again printed his diagnosis and asked him to put it in his wallet.  10/4/2019   Radiographic imaging demonstrates a L2-3 retrolisthesis and a large free fragment at the L2-3 interspace.  I am also suspicious for a facet cyst on the left at L2-3 as well.     Today we discussed options, he has a predominance of back pain associated with retrolisthesis and a large lumbar disc herniation as well as possible facet cyst. Will order MCKAYLA and see patient back in 6 weeks     Patient Active Problem List   Diagnosis    BPH with urinary obstruction    Thyroid nodule    Age-related osteoporosis with current pathological fracture with routine healing    Skin cancer    History of melanoma excision    Basal cell cancer    MCI (mild cognitive impairment) with memory loss    Hypothyroidism    Medial meniscus tear    S/P R knee surgery, medial/lateral menisectomy, loose body removal    Range of motion deficit    Decreased strength    Nuclear cataract    Ocular migraine    Blepharitis of both eyes    Bilateral dry eyes    Memory loss    Tortuous aorta    Meningiomas, multiple    History of vertebral compression fracture    Acute left-sided low back pain without sciatica    Decreased strength of trunk and back    Decreased range of motion of trunk and back    Poor posture     Past Medical History:   Diagnosis Date    Allergic blepharitis 5/20/2014    Basal cell cancer     History of mononucleosis     pupura    History of osteopenia     Hypothyroidism     Melanoma     Osteopenia     Prostate hypertrophy     Thrombocytopenia 2002    Thrombocytopenia 2002    Thyroid disease     Thyroid nodule 07/2009    left nodule     Past Surgical History:   Procedure Laterality Date    CARPAL TUNNEL RELEASE      left    KNEE  "ARTHROSCOPY      KNEE SURGERY      Moh      surgery    ROTATOR CUFF REPAIR      right    SKIN CANCER EXCISION       Family History   Problem Relation Age of Onset    Cancer Other     Alcohol abuse Father     Prostate cancer Neg Hx     Kidney disease Neg Hx      Review of Systems   Constitutional: Negative for appetite change, chills, fever and unexpected weight change.   Respiratory: Negative for shortness of breath and wheezing.    Cardiovascular: Negative for chest pain, palpitations and leg swelling.   Gastrointestinal: Negative for abdominal pain, blood in stool, diarrhea, nausea and vomiting.     Objective:     Vitals:    10/07/19 1352   BP: 110/70   Pulse: 75   SpO2: 97%   Weight: 66.1 kg (145 lb 11.6 oz)   Height: 5' 8" (1.727 m)   PainSc:   5   PainLoc: Back     Body mass index is 22.16 kg/m².  Physical Exam   Constitutional: He is oriented to person, place, and time. He appears well-developed and well-nourished. No distress.   Neck: Carotid bruit is not present. No thyromegaly present.   Cardiovascular: Normal rate, regular rhythm and normal heart sounds. PMI is not displaced.   Pulmonary/Chest: Effort normal and breath sounds normal. No respiratory distress.   Abdominal: Soft. Bowel sounds are normal. He exhibits no distension. There is no tenderness.   Musculoskeletal: He exhibits no edema.   Neurological: He is alert and oriented to person, place, and time.     Assessment:     1. Lumbar disc herniation    2. MCI (mild cognitive impairment) with memory loss    3. Hypothyroidism, unspecified type      Plan:   Lavell was seen today for follow-up.    Diagnoses and all orders for this visit:    Lumbar disc herniation  Comments:  Patient will call [facility] per Dr. Esqueda    MCI (mild cognitive impairment) with memory loss  Comments:  Seems worsening, cannot remember his diagnosis    Hypothyroidism, unspecified type  Comments:  Continue same med        Problem List Items Addressed This Visit     MCI " (mild cognitive impairment) with memory loss    Hypothyroidism      Other Visit Diagnoses     Lumbar disc herniation    -  Primary    Patient will call [facility] per Dr. Esqueda        No orders of the defined types were placed in this encounter.    Follow up in about 3 months (around 1/7/2020) for Follow up.     Medication List           Accurate as of October 7, 2019  2:33 PM. If you have any questions, ask your nurse or doctor.               CONTINUE taking these medications    calcium citrate-vitamin D3 315-200 mg 315-200 mg-unit per tablet  Commonly known as:  CITRACAL+D     ibuprofen 200 MG tablet  Commonly known as:  ADVIL,MOTRIN     levothyroxine 50 MCG tablet  Commonly known as:  SYNTHROID  TAKE 1 TABLET BY MOUTH EVERY DAY     MULTIVITAMIN ORAL     OMEGA-3 ORAL     saw palmetto 160 MG capsule

## 2019-10-08 ENCOUNTER — TELEPHONE (OUTPATIENT)
Dept: PAIN MEDICINE | Facility: CLINIC | Age: 84
End: 2019-10-08

## 2019-10-08 ENCOUNTER — PATIENT OUTREACH (OUTPATIENT)
Dept: ADMINISTRATIVE | Facility: OTHER | Age: 84
End: 2019-10-08

## 2019-10-08 DIAGNOSIS — M54.16 LUMBAR RADICULOPATHY: Primary | ICD-10-CM

## 2019-10-08 NOTE — TELEPHONE ENCOUNTER
Called to confirm appointment with Dr. Thomason on 10/10/19 @ 11:00am          Pt confirmed. Informed pt of IMP. Verified pt insurance. Informed pt of location and suite number.  Patient gave verbal understanding

## 2019-10-09 ENCOUNTER — TELEPHONE (OUTPATIENT)
Dept: INTERNAL MEDICINE | Facility: CLINIC | Age: 84
End: 2019-10-09

## 2019-10-09 NOTE — TELEPHONE ENCOUNTER
----- Message from Dominick Esqueda MD sent at 10/9/2019  7:55 AM CDT -----  Good morning, I ordered him an epidural steroid injection for his large disc herniation.  We will see how that goes.

## 2019-10-09 NOTE — TELEPHONE ENCOUNTER
Please call the patient and make sure he is aware of his appt at [facility]. [facility]

## 2019-10-10 ENCOUNTER — OFFICE VISIT (OUTPATIENT)
Dept: PAIN MEDICINE | Facility: CLINIC | Age: 84
End: 2019-10-10
Attending: ANESTHESIOLOGY
Payer: MEDICARE

## 2019-10-10 VITALS
HEIGHT: 68 IN | HEART RATE: 75 BPM | BODY MASS INDEX: 22.28 KG/M2 | DIASTOLIC BLOOD PRESSURE: 78 MMHG | RESPIRATION RATE: 18 BRPM | WEIGHT: 147 LBS | TEMPERATURE: 98 F | OXYGEN SATURATION: 100 % | SYSTOLIC BLOOD PRESSURE: 115 MMHG

## 2019-10-10 DIAGNOSIS — M47.9 OSTEOARTHRITIS OF SPINE, UNSPECIFIED SPINAL OSTEOARTHRITIS COMPLICATION STATUS, UNSPECIFIED SPINAL REGION: Primary | ICD-10-CM

## 2019-10-10 DIAGNOSIS — M51.36 DDD (DEGENERATIVE DISC DISEASE), LUMBAR: ICD-10-CM

## 2019-10-10 DIAGNOSIS — M47.819 SPONDYLOSIS WITHOUT MYELOPATHY: ICD-10-CM

## 2019-10-10 PROCEDURE — 99999 PR PBB SHADOW E&M-EST. PATIENT-LVL III: ICD-10-PCS | Mod: PBBFAC,HCNC,, | Performed by: ANESTHESIOLOGY

## 2019-10-10 PROCEDURE — 1101F PT FALLS ASSESS-DOCD LE1/YR: CPT | Mod: HCNC,CPTII,S$GLB, | Performed by: ANESTHESIOLOGY

## 2019-10-10 PROCEDURE — 99204 PR OFFICE/OUTPT VISIT, NEW, LEVL IV, 45-59 MIN: ICD-10-PCS | Mod: HCNC,GC,S$GLB, | Performed by: ANESTHESIOLOGY

## 2019-10-10 PROCEDURE — 1101F PR PT FALLS ASSESS DOC 0-1 FALLS W/OUT INJ PAST YR: ICD-10-PCS | Mod: HCNC,CPTII,S$GLB, | Performed by: ANESTHESIOLOGY

## 2019-10-10 PROCEDURE — 99999 PR PBB SHADOW E&M-EST. PATIENT-LVL III: CPT | Mod: PBBFAC,HCNC,, | Performed by: ANESTHESIOLOGY

## 2019-10-10 PROCEDURE — 99204 OFFICE O/P NEW MOD 45 MIN: CPT | Mod: HCNC,GC,S$GLB, | Performed by: ANESTHESIOLOGY

## 2019-10-10 RX ORDER — DICLOFENAC SODIUM 10 MG/G
2 GEL TOPICAL 4 TIMES DAILY
Qty: 1 TUBE | Refills: 2 | Status: SHIPPED | OUTPATIENT
Start: 2019-10-10

## 2019-10-10 NOTE — PROGRESS NOTES
Chronic Pain - New Consult    Referring Physician: Rosalva Howard MD    Chief Complaint:   Chief Complaint   Patient presents with    Back Pain        SUBJECTIVE:    Lavell Coyle presents to the clinic for the evaluation of lower back, trunk pain and was referred here by Dr. Esqueda's office as he feels the patient may benefit from a trans foraminal MCKAYLA at L2/3. Radiographic imaging demonstrates a L2-3 retrolisthesis and a large free fragment at the L2-3 interspace.  I am also suspicious for a facet cyst on the left at L2-3 as well. Pt has never had an injection previously. The pain started 6 month ago following non related injury and symptoms have been worsening.The pain is located in the lower back area and and does not radiate.  The pain is described as dull and is rated as 4/10. The pain is rated with a score of  2/10 on the BEST day and a score of 4/10 on the WORST day.  Symptoms interfere with nothing. The pain is exacerbated by Standing.  The pain is mitigated by medications. He reports spending 0 hours per day reclining. The patient reports 8hours of uninterrupted sleep per night. Pt is active, walking and lifting weights every other day.  Pt is not longer able tolerate walking due to the low back pain.  He notes that he has to sit down to lifts his weights.  Ibuprofen helps.  Massage device helps with pain.  Not doing a lot of stretching at home, but is exercising.      Patient denies night fever/night sweats, urinary incontinence, bowel incontinence, significant weight loss, significant motor weakness and loss of sensations.    Physical Therapy/Home Exercise: yes. He was referred to [facility], but pt states the pain is worse with exercise.     Pain Disability Index Review:  Last 3 PDI Scores 10/10/2019   Pain Disability Index (PDI) 19       Pain Medications:    - Adjuvant Medications: Advil,Motrin ( Ibuprofen)     report:  Reviewed and consistent with medication use as  prescribed.    Pain Procedures: upcoming Lumbar TF MCKAYLA L2/3      Imaging:   10/4/19  Narrative     EXAMINATION:  XR LUMBAR SPINE AP AND LAT WITH FLEX/EXT    CLINICAL HISTORY:  Other intervertebral disc degeneration, lumbar region    FINDINGS:  Four views:    There is degenerative anterolisthesis of L4 on L5 and there is mild retrolisthesis of L1 on L2 and L2 on L3.  There is moderate DJD throughout the L-spine lower T-spine.  No instability seen.      Impression       No instability seen.    Chronic change and DJD.      Electronically signed by: Francisco J Moralez MD  Date: 10/04/2019  Time: 12:36            Past Medical History:   Diagnosis Date    Allergic blepharitis 5/20/2014    Basal cell cancer     History of mononucleosis     pupura    History of osteopenia     Hypothyroidism     Melanoma     Osteopenia     Prostate hypertrophy     Thrombocytopenia 2002    Thrombocytopenia 2002    Thyroid disease     Thyroid nodule 07/2009    left nodule     Past Surgical History:   Procedure Laterality Date    CARPAL TUNNEL RELEASE      left    KNEE ARTHROSCOPY      KNEE SURGERY      Moh      surgery    ROTATOR CUFF REPAIR      right    SKIN CANCER EXCISION       Social History     Socioeconomic History    Marital status:      Spouse name: Not on file    Number of children: Not on file    Years of education: Not on file    Highest education level: Not on file   Occupational History    Occupation: dentist, peridontist.   Social Needs    Financial resource strain: Not on file    Food insecurity:     Worry: Not on file     Inability: Not on file    Transportation needs:     Medical: Not on file     Non-medical: Not on file   Tobacco Use    Smoking status: Never Smoker    Smokeless tobacco: Never Used   Substance and Sexual Activity    Alcohol use: Yes     Alcohol/week: 1.0 standard drinks     Types: 1 Glasses of wine per week     Comment: ONCE DAILY    Drug use: No    Sexual activity: Not  Currently   Lifestyle    Physical activity:     Days per week: Not on file     Minutes per session: Not on file    Stress: Not on file   Relationships    Social connections:     Talks on phone: Not on file     Gets together: Not on file     Attends Oriental orthodox service: Not on file     Active member of club or organization: Not on file     Attends meetings of clubs or organizations: Not on file     Relationship status: Not on file   Other Topics Concern    Not on file   Social History Narrative    Not on file     Family History   Problem Relation Age of Onset    Cancer Other     Alcohol abuse Father     Prostate cancer Neg Hx     Kidney disease Neg Hx        Review of patient's allergies indicates:  No Known Allergies    Current Outpatient Medications   Medication Sig    calcium citrate-vitamin D3 315-200 mg (CITRACAL+D) 315-200 mg-unit per tablet Take 1 tablet by mouth once daily.    ibuprofen (ADVIL,MOTRIN) 200 MG tablet Take 200 mg by mouth every 6 (six) hours as needed for Pain.    levothyroxine (SYNTHROID) 50 MCG tablet TAKE 1 TABLET BY MOUTH EVERY DAY    MULTIVITAMIN ORAL Take by mouth.    OMEGA-3/DHA/EPA/FISH OIL (OMEGA-3 ORAL) Take by mouth.    saw palmetto 160 MG capsule Take 160 mg by mouth 2 (two) times daily.       No current facility-administered medications for this visit.        REVIEW OF SYSTEMS:    GENERAL:  No weight loss, malaise or fevers.  HEENT:  Negative for frequent or significant headaches.  NECK:  Negative for lumps, goiter, pain and significant neck swelling.  RESPIRATORY:  Negative for cough, wheezing or shortness of breath.  CARDIOVASCULAR:  Negative for chest pain, leg swelling or palpitations.  GI:  Negative for abdominal discomfort, blood in stools or black stools or change in bowel habits.  MUSCULOSKELETAL:  See HPI.  SKIN:  Negative for lesions, rash, and itching.  PSYCH:  +ve for sleep disturbance, mood disorder and recent psychosocial stressors.  HEMATOLOGY/LYMPHOLOGY:  " Negative for prolonged bleeding, bruising easily or swollen nodes.  NEURO:   No history of headaches, syncope, paralysis, seizures or tremors.  All other reviewed and negative other than HPI.    OBJECTIVE:    /78   Pulse 75   Temp 98.2 °F (36.8 °C)   Resp 18   Ht 5' 8" (1.727 m)   Wt 66.7 kg (147 lb)   SpO2 100%   BMI 22.35 kg/m²     PHYSICAL EXAMINATION:    General appearance: Well appearing, in no acute distress, alert and oriented x3.  Psych:  Mood and affect appropriate.  Skin: Skin color, texture, turgor normal, no rashes or lesions, in both upper and lower body.  Head/face:  Normocephalic, atraumatic. No palpable lymph nodes.  Neck: No pain to palpation over the cervical paraspinous muscles. Spurling Negative. No pain with neck flexion, extension, or lateral flexion.   Cor: RRR  Pulm: CTA  GI:  Soft and non-tender.  Back: Straight leg raising in the sitting and supine positions is negative to radicular pain. moderate pain to palpation over the spine or costovertebral angles. Normal range of motion without pain reproduction.Positive axial loading test bilateral.  Positive FABERE,Ganselin and Yeoman's test on the both side.negative FADIR  Extremities: Peripheral joint ROM is full and pain free without obvious instability or laxity in all four extremities. No deformities, edema, or skin discoloration. Good capillary refill.  Musculoskeletal: Shoulder, hip, sacroiliac and knee provocative maneuvers are negative. Bilateral upper and lower extremity strength is normal and symmetric.  No atrophy or tone abnormalities are noted.  Neuro: Bilateral upper and lower extremity coordination and muscle stretch reflexes are physiologic and symmetric.  Plantar response are downgoing. No loss of sensation is noted.  Gait: normal.    ASSESSMENT: 87 y.o. year old male with low back pain, consistent with      1. Osteoarthritis of spine, unspecified spinal osteoarthritis complication status, unspecified spinal region  " diclofenac sodium (VOLTAREN) 1 % Gel   2. Spondylosis without myelopathy  diclofenac sodium (VOLTAREN) 1 % Gel   3. DDD (degenerative disc disease), lumbar  diclofenac sodium (VOLTAREN) 1 % Gel         PLAN:     - I have stressed the importance of physical activity and a home exercise plan to help with pain and improve health.  - Patient can continue with medications for now since they are providing benefits, using them appropriately, and without side effects.  - Diagnostic L2-3-4-5 MBB injections to be performed to help identify etiology of back pain.  Consents obtained.     - Rx for Voltaren Gel Topical QID ordered and sent to patient's pharmacy  - Cont with home exercise and stretching.   - RTC 2 weeks following scheduled procedure  - Counseled patient regarding the importance of activity modification, constant sleeping habits and physical therapy.    The above plan and management options were discussed at length with patient. Patient is in agreement with the above and verbalized understanding. It will be communicated with the referring physician via electronic record, fax, or mail.    Krystyna Petersen    I have personally reviewed the history and exam of this patient and agree with the resident/fellow/NPs note as stated above.    Eddie Thomason MD     10/10/2019

## 2019-10-10 NOTE — LETTER
October 25, 2019      Rosalva Howard MD  [address]  [facility] [address]           [facility] [place] [facility] [address]  [address]  [facility] [address]  Phone: [phone]  Fax: [phone]          Patient: Lavell Coyle   MR Number: [id number]   YOB: 1932   Date of Visit: 10/10/2019       Dear Dr. Rosalva Howard:    Thank you for referring Lavell Coyle to me for evaluation. Attached you will find relevant portions of my assessment and plan of care.    If you have questions, please do not hesitate to call me. I look forward to following Lavell Coyle along with you.    Sincerely,    Eddie Thomason MD    Enclosure  CC:  No Recipients    If you would like to receive this communication electronically, please contact [email or web address] or [phone] to request more information on "[employer] Link access.    For providers and/or their staff who would like to refer a patient to [facility], please contact us through our one-stop-shop provider referral line, [facility], at [phone].    If you feel you have received this communication in error or would no longer like to receive these types of communications, please e-mail [email or web address]

## 2019-10-24 ENCOUNTER — TELEPHONE (OUTPATIENT)
Dept: PAIN MEDICINE | Facility: CLINIC | Age: 84
End: 2019-10-24

## 2019-10-24 NOTE — TELEPHONE ENCOUNTER
----- Message from Mohini Macdonald sent at 10/24/2019  3:58 PM CDT -----  Contact: Pt   Name of Who is Calling: LILLY US [[id number]]    What is the request in detail: LILLY US [[id number]] is requesting a call back regards to canceling injection on 10/28/19  Please contact to further discuss and advise      Can the clinic reply by [facility]: No     What Number to [facility] Back [facility]:  [phone] (home)

## 2019-11-04 ENCOUNTER — TELEPHONE (OUTPATIENT)
Dept: INTERNAL MEDICINE | Facility: CLINIC | Age: 84
End: 2019-11-04

## 2019-11-04 NOTE — TELEPHONE ENCOUNTER
----- Message from Violetta Guerrero sent at 11/4/2019  9:48 AM CST -----  Contact: Patient [phone] cell  Patient is requesting a call concerning his back pain.    Please call and advise.    Thank You

## 2019-11-04 NOTE — TELEPHONE ENCOUNTER
Spoke with pt. Stated he called, but said never mind don't worry about the message. Told pt ok sorry for the confusion.

## 2019-11-06 ENCOUNTER — TELEPHONE (OUTPATIENT)
Dept: INTERNAL MEDICINE | Facility: CLINIC | Age: 84
End: 2019-11-06

## 2019-11-06 DIAGNOSIS — M54.16 LUMBAR RADICULOPATHY: Primary | ICD-10-CM

## 2019-11-06 NOTE — TELEPHONE ENCOUNTER
LVM informing of AWV 11/07/19, left [phone] to cancel/reschedule   andrew   Reviewed ECHO  Pt still having some STATON.  recc f/u w/ cardiology, will mail referral  Also encouraged sleep study  Pt verb understanding

## 2019-11-12 ENCOUNTER — TELEPHONE (OUTPATIENT)
Dept: PAIN MEDICINE | Facility: CLINIC | Age: 84
End: 2019-11-12

## 2019-11-12 DIAGNOSIS — M54.16 LUMBAR RADICULOPATHY: Primary | ICD-10-CM

## 2019-11-12 NOTE — TELEPHONE ENCOUNTER
Staff returned call to pt regarding rescheduling procedure.    Pt states he would like to reschedule the procedure he was supposed to have yesterday    Staff informed pt that we will have schedulers contact him within the next 24 hours to reschedule the procedure    Pt gave verbal understanding

## 2019-11-12 NOTE — TELEPHONE ENCOUNTER
----- Message from Ale Byrd, Patient Care Assistant sent at 11/12/2019 11:24 AM CST -----  Contact: LILLY US [[id number]]  Name of Who is Calling: LILLY US [[id number]]    What is the request in detail:Requesting a call back in regards of rescheduling injection. Please contact to further discuss and advise      Can the clinic reply by [facility]: No    What Number [facility] Back [facility]:   [phone]

## 2019-11-15 ENCOUNTER — TELEPHONE (OUTPATIENT)
Dept: PAIN MEDICINE | Facility: CLINIC | Age: 84
End: 2019-11-15

## 2019-11-15 NOTE — TELEPHONE ENCOUNTER
"----- Message from Christina Church sent at 11/15/2019  2:39 PM CST -----  Contact: LILLY US [[id number]]  Name of Who is Calling: LILLY US [[id number]]      What is the request in detail:    Patient called requesting a call. Patient states, "he would like to change his procedure date."   Please give a call back at your earliest convenience and further advise.       THANKS!    Reply by [facility]: NO      Call Back: LILLY US /  PH# [phone]                                    "

## 2019-11-15 NOTE — TELEPHONE ENCOUNTER
"----- Message from Christina Church sent at 11/15/2019  2:39 PM CST -----  Contact: LILLY US [[id number]]  Name of Who is Calling: LILLY US [[id number]]      What is the request in detail:    Patient called requesting a call. Patient states, "he would like to change his procedure date."   Please give a call back at your earliest convenience and further advise.       THANKS!    Reply by [facility]: NO      Call Back: LILLY US /  PH# [phone]                                    "

## 2019-12-04 PROBLEM — G89.29 CHRONIC PAIN: Status: ACTIVE | Noted: 2019-12-04

## 2020-01-06 ENCOUNTER — TELEPHONE (OUTPATIENT)
Dept: INTERNAL MEDICINE | Facility: CLINIC | Age: 85
End: 2020-01-06

## 2020-01-06 NOTE — TELEPHONE ENCOUNTER
----- Message from Roxann Reynoso sent at 1/6/2020 12:35 PM CST -----  Contact: Self  [phone]  Patient would like to get a referral.  Referral to what specialty:  ENT  Does the patient want the referral with a specific physician:  None  Is the specialist an [facility]:  [facility]  Reason (be specific):  Ear Wax build Upp  Does the patient already have the specialty clinic appointment scheduled:  No  If yes, what date is the appointment scheduled:   n/a  Is the insurance listed in Epic correct? (this is important for a referral):  Yes   Comments:

## 2020-01-06 NOTE — TELEPHONE ENCOUNTER
----- Message from Sujit Miller sent at 1/6/2020 12:09 PM CST -----  Contact: self  Requesting to reschedule enhanced wellness appt ,pt can be reached at [phone] <<<< call this number

## 2020-01-14 ENCOUNTER — TELEPHONE (OUTPATIENT)
Dept: INTERNAL MEDICINE | Facility: CLINIC | Age: 85
End: 2020-01-14

## 2020-01-14 DIAGNOSIS — H61.20 IMPACTED CERUMEN, UNSPECIFIED LATERALITY: Primary | ICD-10-CM

## 2020-01-14 NOTE — TELEPHONE ENCOUNTER
Spoke with patient and informed patient of Dr. Howard's message. Patient agreed and will call and schedule.

## 2020-01-14 NOTE — TELEPHONE ENCOUNTER
----- Message from Alex Best sent at 1/14/2020  3:04 PM CST -----  Contact: Self [phone]  Patient would like to get a referral.  Referral to what specialty: ENT    Does the patient want the referral with a specific physician:  yes  Is the [facility]  Reason (be specific):  Ear Wash  Does the patient already have the specialty clinic appointment scheduled:  no  If yes, what date is the appointment scheduled:     Is the insurance listed in Epic correct? (this is important for a referral):  yes  Comments:

## 2020-01-30 ENCOUNTER — PATIENT OUTREACH (OUTPATIENT)
Dept: ADMINISTRATIVE | Facility: OTHER | Age: 85
End: 2020-01-30

## 2020-02-07 ENCOUNTER — PATIENT OUTREACH (OUTPATIENT)
Dept: ADMINISTRATIVE | Facility: OTHER | Age: 85
End: 2020-02-07

## 2020-02-10 ENCOUNTER — OFFICE VISIT (OUTPATIENT)
Dept: OTOLARYNGOLOGY | Facility: CLINIC | Age: 85
End: 2020-02-10
Payer: MEDICARE

## 2020-02-10 DIAGNOSIS — H61.20 IMPACTED CERUMEN, UNSPECIFIED LATERALITY: ICD-10-CM

## 2020-02-10 DIAGNOSIS — H61.23 HEARING LOSS DUE TO CERUMEN IMPACTION, BILATERAL: Primary | ICD-10-CM

## 2020-02-10 DIAGNOSIS — H61.893 DRY EAR CANAL, BILATERAL: ICD-10-CM

## 2020-02-10 PROCEDURE — 99999 PR PBB SHADOW E&M-EST. PATIENT-LVL II: CPT | Mod: PBBFAC,HCNC,, | Performed by: OTOLARYNGOLOGY

## 2020-02-10 PROCEDURE — 99499 NO LOS: ICD-10-PCS | Mod: HCNC,S$GLB,, | Performed by: OTOLARYNGOLOGY

## 2020-02-10 PROCEDURE — 99999 PR PBB SHADOW E&M-EST. PATIENT-LVL II: ICD-10-PCS | Mod: PBBFAC,HCNC,, | Performed by: OTOLARYNGOLOGY

## 2020-02-10 PROCEDURE — 69210 PR REMOVAL IMPACTED CERUMEN REQUIRING INSTRUMENTATION, UNILATERAL: ICD-10-PCS | Mod: HCNC,S$GLB,, | Performed by: OTOLARYNGOLOGY

## 2020-02-10 PROCEDURE — 99499 UNLISTED E&M SERVICE: CPT | Mod: HCNC,S$GLB,, | Performed by: OTOLARYNGOLOGY

## 2020-02-10 PROCEDURE — 69210 REMOVE IMPACTED EAR WAX UNI: CPT | Mod: HCNC,S$GLB,, | Performed by: OTOLARYNGOLOGY

## 2020-02-10 NOTE — LETTER
February 11, 2020      Rosalva Howard MD  [address]  [facility] [address]           [facility]  [address]  [facility] [address]  Phone: [phone]  Fax: [phone]          Patient: Lavell Coyle   MR Number: [id number]   YOB: 1932   Date of Visit: 2/10/2020       Dear Dr. Rosalva Howard:    Thank you for referring Lavell Coyle to me for evaluation. Attached you will find relevant portions of my assessment and plan of care.    If you have questions, please do not hesitate to call me. I look forward to following Lavell Coyle along with you.    Sincerely,    Chema Allen III, MD    Enclosure  CC:  No Recipients    If you would like to receive this communication electronically, please contact [email or web address] or [phone] to request more information on [email or web address] Link access.    For providers and/or their staff who would like to refer a patient to [facility], please contact us through our one-stop-shop provider referral line, [facility], at [phone].    If you feel you have received this communication in error or would no longer like to receive these types of communications, please e-mail [email or web address]

## 2020-02-10 NOTE — PATIENT INSTRUCTIONS
Dry occlusive CIs removed from both eacs  Pt. directed to DIANE Hooper's office re: hearing aid consultation ( scheduled at [facility]) next week

## 2020-02-11 NOTE — PROGRESS NOTES
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.  I last examined him in March 2019 for hearing loss.  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  Amplification was advised at that time.  He returns today for ear cleaning procedures.    He believes he has a scheduled appointment here for hearing aid evaluation. We have ascertained that he has a scheduled appointment at the [facility] next week, in fact.  His medical history includes multiple meningiomas, memory loss and mild cognitive impairment..    Past Medical History:   Diagnosis Date    Allergic blepharitis 5/20/2014    Basal cell cancer     History of mononucleosis     pupura    History of osteopenia     Hypothyroidism     Melanoma     Osteopenia     Prostate hypertrophy     Thrombocytopenia 2002    Thrombocytopenia 2002    Thyroid disease     Thyroid nodule 07/2009    left nodule       PE:  Height 5 ft 8 in weight 160 lb  General:  Alert and oriented gentleman in no acute distress  Both ears were examined under the microscope in the micro procedure room  Procedure:  A large occlusive cerumen impaction is carefully extracted from the right ear canal with use of an angle pick and micro forceps.  A large cerumen impaction with a cuff of  of skin is carefully extracted from the left ear canal in a similar fashion.    The patient is directed to Jojo Hooper's office upon exit from the clinic suite today.  The patient's previous audiometric study is duplicated below for reference.  Audiometry was not performed today.    DIAGNOSIS:     ICD-10-CM ICD-9-CM    1. Hearing loss due to cerumen impaction, bilateral H61.23 389.8      380.4    2. Impacted cerumen, unspecified laterality H61.20 380.4    3. Dry ear canal, bilateral H61.893 380.89      PLAN: Dry occlusive CIs removed from both eacs  Pt. directed to DIANE Hooper's office re: hearing aid  consultation ( scheduled at [facility]) next week

## 2020-02-17 ENCOUNTER — PATIENT OUTREACH (OUTPATIENT)
Dept: ADMINISTRATIVE | Facility: OTHER | Age: 85
End: 2020-02-17

## 2020-02-18 NOTE — PROGRESS NOTES
Chart reviewed.   Immunizations: Triggered Imm Registry     Orders placed: n/a  Upcoming appts to satisfy SRINI topics: n/a

## 2020-02-19 ENCOUNTER — TELEPHONE (OUTPATIENT)
Dept: INTERNAL MEDICINE | Facility: CLINIC | Age: 85
End: 2020-02-19

## 2020-02-19 ENCOUNTER — CLINICAL SUPPORT (OUTPATIENT)
Dept: OTOLARYNGOLOGY | Facility: CLINIC | Age: 85
End: 2020-02-19
Payer: MEDICARE

## 2020-02-19 DIAGNOSIS — Z46.1 ENCOUNTER FOR HEARING AID FITTING OF LEFT EAR: Primary | ICD-10-CM

## 2020-02-19 PROCEDURE — 99499 NO LOS: ICD-10-PCS | Mod: S$GLB,,, | Performed by: AUDIOLOGIST-HEARING AID FITTER

## 2020-02-19 PROCEDURE — 99499 UNLISTED E&M SERVICE: CPT | Mod: S$GLB,,, | Performed by: AUDIOLOGIST-HEARING AID FITTER

## 2020-02-19 NOTE — TELEPHONE ENCOUNTER
----- Message from Ele Amor sent at 2/19/2020  3:34 PM CST -----  Contact: daughter/danette/[phone]  Pt daughter called in regards to touching bases about the pt care before he come in for his appointment. Pt is trying to travel alone and she said that's not a good idea.     Please advise

## 2020-02-19 NOTE — PROGRESS NOTES
Sami Kraus, CCC-A  Audiologist - [facility] [address] [place] [address]  [email or web address]  [phone]    Patient: Lavell Coyle   MRN: [id number]  [address] [facility]  Home Phone [phone]   Work Phone Not on file.   Mobile [phone]   : 1932  SANDERSON: 2020      HEARING AID FITTING      Left ear:  Serial number: [id number]  : [employer]  Model: U-  Type: ITE  Battery size: 312  Speaker power: 80  Warranty expiration: 3/10/2022  L and D expiration: 3/10/2021    Dispensed with default settings at adaptation level 4, Universal program only.  Deactivated program button and ran feedback manager.  Reviewed batteries (on/off), indicators, insertion, removal and storage.  Patient verbalized understanding, will call as needed.      _______________________________  Sami Kraus, TOMA-A  Audiologist

## 2020-02-21 ENCOUNTER — PES CALL (OUTPATIENT)
Dept: ADMINISTRATIVE | Facility: CLINIC | Age: 85
End: 2020-02-21

## 2020-02-22 NOTE — TELEPHONE ENCOUNTER
Please tell her I have spoken about this with him in the past but with his short term memory issues it continues to be a problem even when I write it down. It might be good for someone to come to the appt with him. GML

## 2020-02-24 NOTE — TELEPHONE ENCOUNTER
Spoke to pt's daughter ,states, she will try to accompany pt to his appointment and to please advise pt about traveling alone. Thanks.

## 2020-02-26 ENCOUNTER — TELEPHONE (OUTPATIENT)
Dept: INTERNAL MEDICINE | Facility: CLINIC | Age: 85
End: 2020-02-26

## 2020-02-26 NOTE — TELEPHONE ENCOUNTER
----- Message from Alex Best sent at 2/26/2020  1:00 PM CST -----  Contact: Self [phone]  Patient is returning a phone call.  Who left a message for the patient: Dr Howard  Does patient know what this is regarding:  Unknown  Comments:

## 2020-03-04 RX ORDER — LEVOTHYROXINE SODIUM 50 UG/1
TABLET ORAL
Qty: 30 TABLET | Refills: 0 | Status: SHIPPED | OUTPATIENT
Start: 2020-03-04 | End: 2020-04-02 | Stop reason: SDUPTHER

## 2020-04-02 RX ORDER — LEVOTHYROXINE SODIUM 50 UG/1
50 TABLET ORAL DAILY
Qty: 90 TABLET | Refills: 3 | Status: SHIPPED | OUTPATIENT
Start: 2020-04-02 | End: 2021-06-18

## 2020-05-07 ENCOUNTER — OFFICE VISIT (OUTPATIENT)
Dept: INTERNAL MEDICINE | Facility: CLINIC | Age: 85
End: 2020-05-07
Payer: MEDICARE

## 2020-05-07 DIAGNOSIS — K40.90 LEFT INGUINAL HERNIA: Primary | ICD-10-CM

## 2020-05-07 PROCEDURE — 99442 PR PHYSICIAN TELEPHONE EVALUATION 11-20 MIN: ICD-10-PCS | Mod: HCNC,95,, | Performed by: INTERNAL MEDICINE

## 2020-05-07 PROCEDURE — 99442 PR PHYSICIAN TELEPHONE EVALUATION 11-20 MIN: CPT | Mod: HCNC,95,, | Performed by: INTERNAL MEDICINE

## 2020-05-07 NOTE — PROGRESS NOTES
Subjective:      Patient ID: Lavell Coyle is a 88 y.o. male.    Chief Complaint: Follow-up (New problem: bulge)  The patient location is:home  The chief complaint leading to consultation is: bulge  Visit type: audio only had to use facetime to see area as he would not come in  Total time spent with patient: 15  Each patient to whom he or she provides medical services by telemedicine is:  (1) informed of the relationship between the physician and patient and the respective role of any other health care provider with respect to management of the patient; and (2) notified that he or she may decline to receive medical services by telemedicine and may withdraw from such care at any time.    Notes:     HPI:  HPI  Telephone: note  Interval note 5/7/2020 :  Patient had a specific problem noting a bulge on the left inguinal area.  By face time he was able to show me the area in confirm that this is an inguinal hernia.  He states that earlier today it was larger but he was able to reduce this.  He is not having any pain.  I explained to him the emergency symptoms.  He declined a visit with me and states that he does not wish to have surgery.  We discussed supportive undergarments as well as truss.      Interval note 10/7/2020  Patient again asks what his diagnosis and plan is for his back. I again printed his diagnosis and asked him to put it in his wallet.  10/4/2019   Radiographic imaging demonstrates a L2-3 retrolisthesis and a large free fragment at the L2-3 interspace.  I am also suspicious for a facet cyst on the left at L2-3 as well.     Today we discussed options, he has a predominance of back pain associated with retrolisthesis and a large lumbar disc herniation as well as possible facet cyst. Will order MCKAYLA and see patient back in 6 weeks     Patient Active Problem List   Diagnosis    BPH with urinary obstruction    Thyroid nodule    Age-related osteoporosis with current pathological fracture with routine  healing    Skin cancer    History of melanoma excision    Basal cell cancer    MCI (mild cognitive impairment) with memory loss    Hypothyroidism    Medial meniscus tear    S/P R knee surgery, medial/lateral menisectomy, loose body removal    Range of motion deficit    Decreased strength    Nuclear cataract    Ocular migraine    Blepharitis of both eyes    Bilateral dry eyes    Memory loss    Tortuous aorta    Meningiomas, multiple    History of vertebral compression fracture    Acute left-sided low back pain without sciatica    Decreased strength of trunk and back    Decreased range of motion of trunk and back    Poor posture    Chronic pain     Past Medical History:   Diagnosis Date    Allergic blepharitis 5/20/2014    Basal cell cancer     History of mononucleosis     pupura    History of osteopenia     Hypothyroidism     Melanoma     Osteopenia     Prostate hypertrophy     Thrombocytopenia 2002    Thrombocytopenia 2002    Thyroid disease     Thyroid nodule 07/2009    left nodule     Past Surgical History:   Procedure Laterality Date    CARPAL TUNNEL RELEASE      left    KNEE ARTHROSCOPY      KNEE SURGERY      Moh      surgery    ROTATOR CUFF REPAIR      right    SKIN CANCER EXCISION       Family History   Problem Relation Age of Onset    Cancer Other     Alcohol abuse Father     Prostate cancer Neg Hx     Kidney disease Neg Hx      Review of Systems   Constitutional: Negative for appetite change, chills, fever and unexpected weight change.   Respiratory: Negative for shortness of breath and wheezing.    Cardiovascular: Negative for chest pain, palpitations and leg swelling.   Gastrointestinal: Negative for abdominal pain, blood in stool, diarrhea, nausea and vomiting.     Objective:     There were no vitals filed for this visit.  There is no height or weight on file to calculate BMI.  Physical Exam   Patient demonstrated a reduced inguinal hernia  Assessment:     1.  Left inguinal hernia      Plan:   Lavell was seen today for follow-up.    Diagnoses and all orders for this visit:    Left inguinal hernia  Comments:  Patient declined office appt, understands emergency symptoms, declines surgery, will buy truss or supportive garments    Call if further problems, spent over 15 minutes evaluating and educating    Patient has cognitive decline    Problem List Items Addressed This Visit     None      Visit Diagnoses     Left inguinal hernia    -  Primary    Patient declined office appt, understands emergency symptoms, declines surgery, will buy truss or supportive garments        No orders of the defined types were placed in this encounter.    No follow-ups on file.     Medication List           Accurate as of May 7, 2020  2:59 PM. If you have any questions, ask your nurse or doctor.               CONTINUE taking these medications    calcium citrate-vitamin D3 315-200 mg 315-200 mg-unit per tablet  Commonly known as:  CITRACAL+D     diclofenac sodium 1 % Gel  Commonly known as:  VOLTAREN  Apply 2 g topically 4 (four) times daily.     ibuprofen 200 MG tablet  Commonly known as:  ADVIL,MOTRIN     levothyroxine 50 MCG tablet  Commonly known as:  SYNTHROID  Take 1 tablet (50 mcg total) by mouth once daily.     MULTIVITAMIN ORAL     OMEGA-3 ORAL     saw palmetto 160 MG capsule

## 2020-05-08 ENCOUNTER — TELEPHONE (OUTPATIENT)
Dept: INTERNAL MEDICINE | Facility: CLINIC | Age: 85
End: 2020-05-08

## 2020-05-08 NOTE — TELEPHONE ENCOUNTER
----- Message from Suzi Montesinos sent at 5/8/2020  4:28 PM CDT -----  Contact: Self   Pt is requesting order for bronchology. Please advise

## 2020-05-08 NOTE — TELEPHONE ENCOUNTER
Please call and see what is going on: he has significant short term memory loss. I spoke to him yesterday about his inguinal hernia. He is a trained oral surgeon.    Am not sure I understand what he needs. It may be an appt with me.

## 2020-06-01 ENCOUNTER — TELEPHONE (OUTPATIENT)
Dept: INTERNAL MEDICINE | Facility: CLINIC | Age: 85
End: 2020-06-01

## 2020-06-01 DIAGNOSIS — K62.89 RECTAL PAIN: Primary | ICD-10-CM

## 2020-06-01 NOTE — TELEPHONE ENCOUNTER
----- Message from Deidre Rothman sent at 6/1/2020  3:12 PM CDT -----  Contact: [phone]  Patient is requesting a call from the office regarding a referral to see a doctor for his rectum pain.  Please advise, thank you

## 2020-06-01 NOTE — TELEPHONE ENCOUNTER
Can he be a little more specific and does he wan to see me in the office. 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

## 2020-06-03 ENCOUNTER — TELEPHONE (OUTPATIENT)
Dept: SURGERY | Facility: CLINIC | Age: 85
End: 2020-06-03

## 2020-06-03 NOTE — TELEPHONE ENCOUNTER
Spoke with patient. Patient requested first available. Appointment made with Dr Carpenter on Tuesday 6/9 at 1:40pm. Patient instructed on location. No other questions at this time.

## 2020-06-19 ENCOUNTER — TELEPHONE (OUTPATIENT)
Dept: SURGERY | Facility: CLINIC | Age: 85
End: 2020-06-19

## 2020-06-19 NOTE — TELEPHONE ENCOUNTER
----- Message from Roxana Pruitt sent at 6/19/2020  3:11 PM CDT -----  Regarding: appointment  Contact: pt @ [phone]  Asking to be seen sooner if possible, please call.

## 2020-07-06 ENCOUNTER — TELEPHONE (OUTPATIENT)
Dept: SURGERY | Facility: CLINIC | Age: 85
End: 2020-07-06

## 2020-07-07 ENCOUNTER — OFFICE VISIT (OUTPATIENT)
Dept: SURGERY | Facility: CLINIC | Age: 85
End: 2020-07-07
Payer: MEDICARE

## 2020-07-07 VITALS
HEIGHT: 68 IN | WEIGHT: 147.06 LBS | BODY MASS INDEX: 22.29 KG/M2 | DIASTOLIC BLOOD PRESSURE: 80 MMHG | HEART RATE: 70 BPM | SYSTOLIC BLOOD PRESSURE: 120 MMHG

## 2020-07-07 DIAGNOSIS — K62.89 RECTAL PAIN: ICD-10-CM

## 2020-07-07 DIAGNOSIS — K59.4 PROCTALGIA FUGAX: Primary | ICD-10-CM

## 2020-07-07 PROCEDURE — 46600 PR DIAG2STIC A2SCOPY: ICD-10-PCS | Mod: HCNC,S$GLB,, | Performed by: COLON & RECTAL SURGERY

## 2020-07-07 PROCEDURE — 1126F PR PAIN SEVERITY QUANTIFIED, NO PAIN PRESENT: ICD-10-PCS | Mod: HCNC,S$GLB,, | Performed by: COLON & RECTAL SURGERY

## 2020-07-07 PROCEDURE — 99203 OFFICE O/P NEW LOW 30 MIN: CPT | Mod: 25,HCNC,S$GLB, | Performed by: COLON & RECTAL SURGERY

## 2020-07-07 PROCEDURE — 1159F MED LIST DOCD IN RCRD: CPT | Mod: HCNC,S$GLB,, | Performed by: COLON & RECTAL SURGERY

## 2020-07-07 PROCEDURE — 99999 PR PBB SHADOW E&M-EST. PATIENT-LVL III: ICD-10-PCS | Mod: PBBFAC,HCNC,, | Performed by: COLON & RECTAL SURGERY

## 2020-07-07 PROCEDURE — 1159F PR MEDICATION LIST DOCUMENTED IN MEDICAL RECORD: ICD-10-PCS | Mod: HCNC,S$GLB,, | Performed by: COLON & RECTAL SURGERY

## 2020-07-07 PROCEDURE — 1101F PT FALLS ASSESS-DOCD LE1/YR: CPT | Mod: HCNC,CPTII,S$GLB, | Performed by: COLON & RECTAL SURGERY

## 2020-07-07 PROCEDURE — 99999 PR PBB SHADOW E&M-EST. PATIENT-LVL III: CPT | Mod: PBBFAC,HCNC,, | Performed by: COLON & RECTAL SURGERY

## 2020-07-07 PROCEDURE — 99203 PR OFFICE/OUTPT VISIT, NEW, LEVL III, 30-44 MIN: ICD-10-PCS | Mod: 25,HCNC,S$GLB, | Performed by: COLON & RECTAL SURGERY

## 2020-07-07 PROCEDURE — 1101F PR PT FALLS ASSESS DOC 0-1 FALLS W/OUT INJ PAST YR: ICD-10-PCS | Mod: HCNC,CPTII,S$GLB, | Performed by: COLON & RECTAL SURGERY

## 2020-07-07 PROCEDURE — 1126F AMNT PAIN NOTED NONE PRSNT: CPT | Mod: HCNC,S$GLB,, | Performed by: COLON & RECTAL SURGERY

## 2020-07-07 PROCEDURE — 46600 DIAGNOSTIC ANOSCOPY SPX: CPT | Mod: HCNC,S$GLB,, | Performed by: COLON & RECTAL SURGERY

## 2020-07-07 NOTE — PROGRESS NOTES
CRS Office Visit History and Physical    Referring Md:   Rosalva Howard Md  [address] [place] [address]    SUBJECTIVE:     Chief Complaint:  Rectal pain    History of Present Illness:  The patient is new patient to this practice.   Course is as follows:  Patient is a 88 y.o. male presents with rectal pain.    He describes it more as a discomfort.  Is in present for 1-2 months.  Does not need to take Tylenol or ibuprofen to help with his discomfort.  Discomfort lasts approximately 30 min.  Worse with sitting.  Does not interfere with his sleep.  No relationship with his bowel movements.  No rectal bleeding.  No prolapse of tissue.  Bowel movements once per day.  No family history of colon rectal cancer.  He is a former dentist.    Last Colonoscopy:  2009:  Normal    Review of patient's allergies indicates:  No Known Allergies    Past Medical History:   Diagnosis Date    Allergic blepharitis 5/20/2014    Basal cell cancer     History of mononucleosis     pupura    History of osteopenia     Hypothyroidism     Melanoma     Osteopenia     Prostate hypertrophy     Thrombocytopenia 2002    Thrombocytopenia 2002    Thyroid disease     Thyroid nodule 07/2009    left nodule     Past Surgical History:   Procedure Laterality Date    CARPAL TUNNEL RELEASE      left    KNEE ARTHROSCOPY      KNEE SURGERY      Moh      surgery    ROTATOR CUFF REPAIR      right    SKIN CANCER EXCISION       Family History   Problem Relation Age of Onset    Cancer Other     Alcohol abuse Father     Prostate cancer Neg Hx     Kidney disease Neg Hx      Social History     Tobacco Use    Smoking status: Never Smoker    Smokeless tobacco: Never Used   Substance Use Topics    Alcohol use: Yes     Alcohol/week: 1.0 standard drinks     Types: 1 Glasses of wine per week     Comment: ONCE DAILY    Drug use: No        Review of Systems:  Review of Systems   Constitutional: Negative for chills, diaphoresis, fever,  "malaise/fatigue and weight loss.   HENT: Negative for congestion.    Respiratory: Negative for shortness of breath.    Cardiovascular: Negative for chest pain and leg swelling.   Gastrointestinal: Negative for abdominal pain, blood in stool, constipation, nausea and vomiting.   Genitourinary: Negative for dysuria.   Musculoskeletal: Negative for back pain and myalgias.   Skin: Negative for rash.   Neurological: Negative for dizziness and weakness.        Memory loss   Endo/Heme/Allergies: Does not bruise/bleed easily.   Psychiatric/Behavioral: Negative for depression.       OBJECTIVE:     Vital Signs (Most Recent)  /80 (BP Location: Left arm, Patient Position: Sitting, BP Method: Large (Automatic))   Pulse 70   Ht 5' 8" (1.727 m)   Wt 66.7 kg (147 lb 0.8 oz)   BMI 22.36 kg/m²     Physical Exam:  General: White male in no distress   Neuro: alert and oriented x 4.  Moves all extremities.     HEENT: no icterus.  Trachea midline  Respiratory: respirations are even and unlabored  Cardiac: regular rate  Abdomen:  Soft, nontender, no masses  Extremities: Warm dry and intact  Skin: no rashes  Anorectal:  External exam was normal.  No tenderness in the ischiorectal fossa.  No tenderness over the coccyx.  Digital exam was performed.  No tenderness in the levators.  No tenderness with posterior traction of the puborectalis.  No internal masses palpated.  No fissure.  Detailed anoscopy was performed.  Small volume internal hemorrhoids.    Labs:  No recent labs    Imaging:  No abdominal imaging      ASSESSMENT/PLAN:     Lavell was seen today for rectal pain.    Diagnoses and all orders for this visit:    Proctalgia fugax    Rectal pain  -     Ambulatory referral/consult to Colorectal Surgery        80-year-old gentleman with rectal discomfort consistent with proctalgia fugax.  Reassurance provided.  No abnormalities found on exam.    ADALGISA Carpenter MD, FACS  Staff Surgeon  Colon & Rectal Surgery      "

## 2020-07-07 NOTE — LETTER
July 7, 2020      Rosalva Howard MD  [address]  [facility] [address]           [facility]  [address]  [facility] [address]  Phone: [phone]  Fax: [phone]          Patient: Lavell Coyle   MR Number: [id number]   YOB: 1932   Date of Visit: 7/7/2020       Dear Dr. Rosalva Howard:    Thank you for referring Lavell Coyle to me for evaluation. Attached you will find relevant portions of my assessment and plan of care.    If you have questions, please do not hesitate to call me. I look forward to following Lavell Coyle along with you.    Sincerely,    Wilbur Carpenter MD    Enclosure  CC:  No Recipients    If you would like to receive this communication electronically, please contact [email or web address] or [phone] to request more information on [employer] access.    For providers and/or their staff who would like to refer a patient to [facility], please contact us through our one-stop-shop provider referral line, [facility], at [phone].    If you feel you have received this communication in error or would no longer like to receive these types of communications, please e-mail [email or web address]

## 2020-07-21 ENCOUNTER — OFFICE VISIT (OUTPATIENT)
Dept: INTERNAL MEDICINE | Facility: CLINIC | Age: 85
End: 2020-07-21
Payer: MEDICARE

## 2020-07-21 ENCOUNTER — TELEPHONE (OUTPATIENT)
Dept: INTERNAL MEDICINE | Facility: CLINIC | Age: 85
End: 2020-07-21

## 2020-07-21 VITALS
BODY MASS INDEX: 20.78 KG/M2 | DIASTOLIC BLOOD PRESSURE: 70 MMHG | OXYGEN SATURATION: 98 % | HEIGHT: 68 IN | SYSTOLIC BLOOD PRESSURE: 122 MMHG | HEART RATE: 74 BPM | WEIGHT: 137.13 LBS

## 2020-07-21 DIAGNOSIS — K59.4 PROCTALGIA FUGAX: Primary | ICD-10-CM

## 2020-07-21 DIAGNOSIS — I77.1 TORTUOUS AORTA: ICD-10-CM

## 2020-07-21 DIAGNOSIS — D42.9 MENINGIOMAS, MULTIPLE: ICD-10-CM

## 2020-07-21 PROCEDURE — 1101F PR PT FALLS ASSESS DOC 0-1 FALLS W/OUT INJ PAST YR: ICD-10-PCS | Mod: HCNC,CPTII,S$GLB, | Performed by: INTERNAL MEDICINE

## 2020-07-21 PROCEDURE — 99499 RISK ADDL DX/OHS AUDIT: ICD-10-PCS | Mod: S$GLB,,, | Performed by: INTERNAL MEDICINE

## 2020-07-21 PROCEDURE — 99213 PR OFFICE/OUTPT VISIT, EST, LEVL III, 20-29 MIN: ICD-10-PCS | Mod: HCNC,S$GLB,, | Performed by: INTERNAL MEDICINE

## 2020-07-21 PROCEDURE — 99999 PR PBB SHADOW E&M-EST. PATIENT-LVL III: CPT | Mod: PBBFAC,HCNC,, | Performed by: INTERNAL MEDICINE

## 2020-07-21 PROCEDURE — 1126F PR PAIN SEVERITY QUANTIFIED, NO PAIN PRESENT: ICD-10-PCS | Mod: HCNC,S$GLB,, | Performed by: INTERNAL MEDICINE

## 2020-07-21 PROCEDURE — 1159F MED LIST DOCD IN RCRD: CPT | Mod: HCNC,S$GLB,, | Performed by: INTERNAL MEDICINE

## 2020-07-21 PROCEDURE — 1159F PR MEDICATION LIST DOCUMENTED IN MEDICAL RECORD: ICD-10-PCS | Mod: HCNC,S$GLB,, | Performed by: INTERNAL MEDICINE

## 2020-07-21 PROCEDURE — 1101F PT FALLS ASSESS-DOCD LE1/YR: CPT | Mod: HCNC,CPTII,S$GLB, | Performed by: INTERNAL MEDICINE

## 2020-07-21 PROCEDURE — 1126F AMNT PAIN NOTED NONE PRSNT: CPT | Mod: HCNC,S$GLB,, | Performed by: INTERNAL MEDICINE

## 2020-07-21 PROCEDURE — 99499 UNLISTED E&M SERVICE: CPT | Mod: S$GLB,,, | Performed by: INTERNAL MEDICINE

## 2020-07-21 PROCEDURE — 99213 OFFICE O/P EST LOW 20 MIN: CPT | Mod: HCNC,S$GLB,, | Performed by: INTERNAL MEDICINE

## 2020-07-21 PROCEDURE — 99999 PR PBB SHADOW E&M-EST. PATIENT-LVL III: ICD-10-PCS | Mod: PBBFAC,HCNC,, | Performed by: INTERNAL MEDICINE

## 2020-07-21 NOTE — PROGRESS NOTES
Subjective:      Patient ID: Lavell Coyle is a 88 y.o. male.    Chief Complaint: Follow-up    HPI:  HPI   Patient is here for follow up of rectal pain.He was diagnosed with proctalgia fugax by CRS.    We discussed his memory issues.He states that it is short term usually.  Patient Active Problem List   Diagnosis    BPH with urinary obstruction    Thyroid nodule    Age-related osteoporosis with current pathological fracture with routine healing    Skin cancer    History of melanoma excision    Basal cell cancer    MCI (mild cognitive impairment) with memory loss    Hypothyroidism    Medial meniscus tear    S/P R knee surgery, medial/lateral menisectomy, loose body removal    Range of motion deficit    Decreased strength    Nuclear cataract    Ocular migraine    Blepharitis of both eyes    Bilateral dry eyes    Memory loss    Tortuous aorta    Meningiomas, multiple    History of vertebral compression fracture    Acute left-sided low back pain without sciatica    Decreased strength of trunk and back    Decreased range of motion of trunk and back    Poor posture    Chronic pain     Past Medical History:   Diagnosis Date    Allergic blepharitis 5/20/2014    Basal cell cancer     History of mononucleosis     pupura    History of osteopenia     Hypothyroidism     Melanoma     Osteopenia     Prostate hypertrophy     Thrombocytopenia 2002    Thrombocytopenia 2002    Thyroid disease     Thyroid nodule 07/2009    left nodule     Past Surgical History:   Procedure Laterality Date    CARPAL TUNNEL RELEASE      left    KNEE ARTHROSCOPY      KNEE SURGERY      Moh      surgery    ROTATOR CUFF REPAIR      right    SKIN CANCER EXCISION       Family History   Problem Relation Age of Onset    Cancer Other     Alcohol abuse Father     Prostate cancer Neg Hx     Kidney disease Neg Hx      Review of Systems   Constitutional: Negative for activity change, chills, fever and unexpected weight  "change.   Respiratory: Negative for cough, chest tightness, shortness of breath and wheezing.    Cardiovascular: Negative for chest pain, palpitations and leg swelling.     Objective:     Vitals:    07/21/20 1433   BP: 122/70   Pulse: 74   SpO2: 98%   Weight: 62.2 kg (137 lb 2 oz)   Height: 5' 8" (1.727 m)   PainSc: 0-No pain     Body mass index is 20.85 kg/m².  Physical Exam  Constitutional:       General: He is not in acute distress.     Appearance: He is well-developed.   Neck:      Thyroid: No thyromegaly.      Vascular: No carotid bruit.   Cardiovascular:      Rate and Rhythm: Normal rate and regular rhythm.      Heart sounds: Normal heart sounds.   Pulmonary:      Effort: Pulmonary effort is normal. No respiratory distress.      Breath sounds: Normal breath sounds.       Assessment:     1. Proctalgia fugax    2. Meningiomas, multiple    3. Tortuous aorta      Plan:   Lavell was seen today for follow-up.    Diagnoses and all orders for this visit:    Proctalgia fugax  Comments:  Discussed    Meningiomas, multiple  Comments:  Incidental finding on 2013 MRI    Tortuous aorta  Comments:  No medication        Problem List Items Addressed This Visit     Tortuous aorta    Overview     Location in Record and Date: CXR-11/10/2014  "There is tortuosity of the descending aorta which can be seen with chronic hypertension."     Please document in your clinic note and add to the Problem List if the patient has:     Tortuous Aorta  Other          Meningiomas, multiple    Overview        Location in Record and Date:  Meningiomas  MRI Brain-11/28/2012    "Cranial MRI examination demonstrates a very small foci of extraaxial enhancement, most consistent with tiny meningiomas, arising from the falx in the inferior supraorbital frontal region and parietal region in the midline."  Please document in your clinic note and add to the Problem List if the patient has:     Meningiomas    Other   Clinically Undetermined            Other " Visit Diagnoses     Proctalgia fugax    -  Primary    Discussed        No orders of the defined types were placed in this encounter.    Follow up in about 6 months (around 1/21/2021) for Follow up in 6 months.     Medication List          Accurate as of July 21, 2020  3:10 PM. If you have any questions, ask your nurse or doctor.            CONTINUE taking these medications    calcium citrate-vitamin D3 315-200 mg 315-200 mg-unit per tablet  Commonly known as: CITRACAL+D     diclofenac sodium 1 % Gel  Commonly known as: VOLTAREN  Apply 2 g topically 4 (four) times daily.     ibuprofen 200 MG tablet  Commonly known as: ADVIL,MOTRIN     levothyroxine 50 MCG tablet  Commonly known as: SYNTHROID  Take 1 tablet (50 mcg total) by mouth once daily.     MULTIVITAMIN ORAL     OMEGA-3 ORAL     saw palmetto 160 MG capsule

## 2020-07-21 NOTE — TELEPHONE ENCOUNTER
----- Message from Alberto Hanson sent at 7/21/2020  1:06 PM CDT -----  The patient requested a callback to let him if his appointment is still on for today.     Thank you

## 2020-08-03 ENCOUNTER — TELEPHONE (OUTPATIENT)
Dept: SURGERY | Facility: CLINIC | Age: 85
End: 2020-08-03

## 2020-08-17 ENCOUNTER — TELEPHONE (OUTPATIENT)
Dept: SURGERY | Facility: CLINIC | Age: 85
End: 2020-08-17

## 2020-08-17 NOTE — TELEPHONE ENCOUNTER
----- Message from Clarissa Burnham sent at 8/17/2020 10:38 AM CDT -----  Regarding: Nurse callback Request  Contact: PT  PT called in reporting rectal pain - would like to speak with nurse    Callback: [phone]

## 2020-08-18 ENCOUNTER — LAB VISIT (OUTPATIENT)
Dept: LAB | Facility: HOSPITAL | Age: 85
End: 2020-08-18
Attending: COLON & RECTAL SURGERY
Payer: MEDICARE

## 2020-08-18 ENCOUNTER — OFFICE VISIT (OUTPATIENT)
Dept: SURGERY | Facility: CLINIC | Age: 85
End: 2020-08-18
Payer: MEDICARE

## 2020-08-18 ENCOUNTER — TELEPHONE (OUTPATIENT)
Dept: SURGERY | Facility: CLINIC | Age: 85
End: 2020-08-18

## 2020-08-18 VITALS
HEART RATE: 85 BPM | SYSTOLIC BLOOD PRESSURE: 115 MMHG | BODY MASS INDEX: 22.43 KG/M2 | HEIGHT: 68 IN | DIASTOLIC BLOOD PRESSURE: 85 MMHG | WEIGHT: 148 LBS

## 2020-08-18 DIAGNOSIS — K62.89 RECTAL PAIN: Primary | ICD-10-CM

## 2020-08-18 DIAGNOSIS — K62.89 RECTAL PAIN: ICD-10-CM

## 2020-08-18 LAB
CREAT SERPL-MCNC: 1 MG/DL (ref 0.5–1.4)
EST. GFR  (AFRICAN AMERICAN): >60 ML/MIN/1.73 M^2
EST. GFR  (NON AFRICAN AMERICAN): >60 ML/MIN/1.73 M^2

## 2020-08-18 PROCEDURE — 1126F AMNT PAIN NOTED NONE PRSNT: CPT | Mod: HCNC,S$GLB,, | Performed by: COLON & RECTAL SURGERY

## 2020-08-18 PROCEDURE — 99213 PR OFFICE/OUTPT VISIT, EST, LEVL III, 20-29 MIN: ICD-10-PCS | Mod: HCNC,S$GLB,, | Performed by: COLON & RECTAL SURGERY

## 2020-08-18 PROCEDURE — 99999 PR PBB SHADOW E&M-EST. PATIENT-LVL III: CPT | Mod: PBBFAC,HCNC,, | Performed by: COLON & RECTAL SURGERY

## 2020-08-18 PROCEDURE — 1101F PR PT FALLS ASSESS DOC 0-1 FALLS W/OUT INJ PAST YR: ICD-10-PCS | Mod: HCNC,CPTII,S$GLB, | Performed by: COLON & RECTAL SURGERY

## 2020-08-18 PROCEDURE — 1159F PR MEDICATION LIST DOCUMENTED IN MEDICAL RECORD: ICD-10-PCS | Mod: HCNC,S$GLB,, | Performed by: COLON & RECTAL SURGERY

## 2020-08-18 PROCEDURE — 1126F PR PAIN SEVERITY QUANTIFIED, NO PAIN PRESENT: ICD-10-PCS | Mod: HCNC,S$GLB,, | Performed by: COLON & RECTAL SURGERY

## 2020-08-18 PROCEDURE — 99999 PR PBB SHADOW E&M-EST. PATIENT-LVL III: ICD-10-PCS | Mod: PBBFAC,HCNC,, | Performed by: COLON & RECTAL SURGERY

## 2020-08-18 PROCEDURE — 99213 OFFICE O/P EST LOW 20 MIN: CPT | Mod: HCNC,S$GLB,, | Performed by: COLON & RECTAL SURGERY

## 2020-08-18 PROCEDURE — 1159F MED LIST DOCD IN RCRD: CPT | Mod: HCNC,S$GLB,, | Performed by: COLON & RECTAL SURGERY

## 2020-08-18 PROCEDURE — 82565 ASSAY OF CREATININE: CPT | Mod: HCNC

## 2020-08-18 PROCEDURE — 1101F PT FALLS ASSESS-DOCD LE1/YR: CPT | Mod: HCNC,CPTII,S$GLB, | Performed by: COLON & RECTAL SURGERY

## 2020-08-18 PROCEDURE — 36415 COLL VENOUS BLD VENIPUNCTURE: CPT | Mod: HCNC

## 2020-08-18 NOTE — PROGRESS NOTES
"CRS Office Visit Followup    Referring Md:   No referring provider defined for this encounter.    SUBJECTIVE:     Chief Complaint:  Rectal pain    History of Present Illness:    Course is as follows:  Patient is a 88 y.o. male presents with rectal pain.    7/7/20:  He describes it more as a discomfort.  Is in present for 1-2 months.  Does not need to take Tylenol or ibuprofen to help with his discomfort.  Discomfort lasts approximately 30 min.  Worse with sitting.  Does not interfere with his sleep.  No relationship with his bowel movements.  No rectal bleeding.  No prolapse of tissue.  Bowel movements once per day.  No family history of colon rectal cancer.  He is a former dentist.   - normal exam.  Diagnosed with proctalgia fugax.  Reassurance provided.    Current status:  This for evaluation for recurrent rectal discomfort.  Worse with sitting.  Improved with walking.  Occurs daily.  Does not interfere with sleep.  Seeking reassurance    Last Colonoscopy:  2009:  Normal    Review of Systems:  Review of Systems   Constitutional: Negative for chills, diaphoresis, fever, malaise/fatigue and weight loss.   HENT: Negative for congestion.    Respiratory: Negative for shortness of breath.    Cardiovascular: Negative for chest pain and leg swelling.   Gastrointestinal: Negative for abdominal pain, blood in stool, constipation, nausea and vomiting.   Genitourinary: Negative for dysuria.   Musculoskeletal: Negative for back pain and myalgias.   Skin: Negative for rash.   Neurological: Negative for dizziness and weakness.        Memory loss   Endo/Heme/Allergies: Does not bruise/bleed easily.   Psychiatric/Behavioral: Negative for depression.       OBJECTIVE:     Vital Signs (Most Recent)  /85 (BP Location: Left arm, Patient Position: Sitting, BP Method: Large (Automatic))   Pulse 85   Ht 5' 8" (1.727 m)   Wt 67.1 kg (148 lb)   BMI 22.50 kg/m²     Physical Exam:  General: White male in no distress   Neuro: alert " "and oriented x 4.  Moves all extremities.     HEENT: no icterus.  Trachea midline  Respiratory: respirations are even and unlabored  Cardiac: regular rate  Abdomen:  Soft, nontender, no masses  Extremities: Warm dry and intact  Skin: no rashes  Anorectal:  Deferred today.  From prior:  "External exam was normal.  No tenderness in the ischiorectal fossa.  No tenderness over the coccyx.  Digital exam was performed.  No tenderness in the levators.  No tenderness with posterior traction of the puborectalis.  No internal masses palpated.  No fissure.  Detailed anoscopy was performed.  Small volume internal hemorrhoids."    Labs:  No recent labs    Imaging:  No abdominal imaging      ASSESSMENT/PLAN:     Lavell was seen today for rectal pain.    Diagnoses and all orders for this visit:    Rectal pain  -     MRI Pelvis W WO Contrast; Future  -     Creatinine, serum; Future        88-year-old gentleman with rectal discomfort consistent with proctalgia fugax.  Out of ongoing concern, MRI of the pelvis has been ordered.  I will follow up with him with results.    ADALGISA Carpenter MD, FACS  Staff Surgeon  Colon & Rectal Surgery        "

## 2020-11-09 ENCOUNTER — PATIENT OUTREACH (OUTPATIENT)
Dept: ADMINISTRATIVE | Facility: OTHER | Age: 85
End: 2020-11-09

## 2020-11-09 NOTE — PROGRESS NOTES
LINKS immunization registry not responding  Care Everywhere updated  Health Maintenance updated  Chart reviewed for overdue Proactive [facility] Encounters ([facility]) health maintenance testing (CRS, Breast Ca, Diabetic Eye Exam)   Orders entered:N/A

## 2020-11-27 ENCOUNTER — HOSPITAL ENCOUNTER (OUTPATIENT)
Dept: RADIOLOGY | Facility: HOSPITAL | Age: 85
Discharge: HOME | End: 2020-11-27
Attending: COLON & RECTAL SURGERY
Payer: MEDICARE

## 2020-11-27 DIAGNOSIS — K62.89 RECTAL PAIN: ICD-10-CM

## 2020-11-27 PROCEDURE — 72195 MRI PELVIS W/O DYE: CPT | Mod: TC,HCNC

## 2020-11-27 PROCEDURE — 72195 MRI PELVIS WITHOUT CONTRAST: ICD-10-PCS | Mod: 26,HCNC,, | Performed by: RADIOLOGY

## 2020-11-27 PROCEDURE — 72195 MRI PELVIS W/O DYE: CPT | Mod: 26,HCNC,, | Performed by: RADIOLOGY

## 2020-12-04 ENCOUNTER — TELEPHONE (OUTPATIENT)
Dept: SURGERY | Facility: CLINIC | Age: 85
End: 2020-12-04

## 2020-12-04 NOTE — TELEPHONE ENCOUNTER
Returned pt's call about the MRI results. Informed him Dr Carpenter tried to reach him but no one picked up the phone. Told him the MRI was normal. No masses or abnormalities. He says he does still have some discomfort. Explained that is due to his diagnosis of proctalgia fugax and there is not medication that will help that. He asked me to cancel his appt if he has one since MRI is normal.

## 2020-12-04 NOTE — TELEPHONE ENCOUNTER
----- Message from Maryann Culver sent at 12/4/2020  2:01 PM CST -----  Regarding: MRI results  Contact: [phone]  Pt called in stated he would like to get a call back with MRI results or an appointment. Pt can be reached at [phone]

## 2021-01-25 ENCOUNTER — LAB VISIT (OUTPATIENT)
Dept: LAB | Facility: HOSPITAL | Age: 86
End: 2021-01-25
Attending: INTERNAL MEDICINE
Payer: MEDICARE

## 2021-01-25 ENCOUNTER — OFFICE VISIT (OUTPATIENT)
Dept: INTERNAL MEDICINE | Facility: CLINIC | Age: 86
End: 2021-01-25
Payer: MEDICARE

## 2021-01-25 VITALS
BODY MASS INDEX: 22.09 KG/M2 | SYSTOLIC BLOOD PRESSURE: 90 MMHG | OXYGEN SATURATION: 98 % | WEIGHT: 145.75 LBS | HEART RATE: 74 BPM | DIASTOLIC BLOOD PRESSURE: 60 MMHG | HEIGHT: 68 IN

## 2021-01-25 DIAGNOSIS — E03.9 HYPOTHYROIDISM, UNSPECIFIED TYPE: Primary | ICD-10-CM

## 2021-01-25 DIAGNOSIS — D42.9 MENINGIOMAS, MULTIPLE: ICD-10-CM

## 2021-01-25 DIAGNOSIS — K40.90 UNILATERAL INGUINAL HERNIA WITHOUT OBSTRUCTION OR GANGRENE, RECURRENCE NOT SPECIFIED: ICD-10-CM

## 2021-01-25 DIAGNOSIS — G31.84 MCI (MILD COGNITIVE IMPAIRMENT): ICD-10-CM

## 2021-01-25 DIAGNOSIS — E03.9 HYPOTHYROIDISM, UNSPECIFIED TYPE: ICD-10-CM

## 2021-01-25 DIAGNOSIS — I77.1 TORTUOUS AORTA: ICD-10-CM

## 2021-01-25 PROBLEM — D69.2 OTHER NONTHROMBOCYTOPENIC PURPURA: Status: ACTIVE | Noted: 2021-01-25

## 2021-01-25 PROBLEM — D69.2 OTHER NONTHROMBOCYTOPENIC PURPURA: Status: RESOLVED | Noted: 2021-01-25 | Resolved: 2021-01-25

## 2021-01-25 LAB
ALBUMIN SERPL BCP-MCNC: 3.6 G/DL (ref 3.5–5.2)
ALP SERPL-CCNC: 61 U/L (ref 55–135)
ALT SERPL W/O P-5'-P-CCNC: 14 U/L (ref 10–44)
ANION GAP SERPL CALC-SCNC: 5 MMOL/L (ref 8–16)
AST SERPL-CCNC: 18 U/L (ref 10–40)
BASOPHILS # BLD AUTO: 0.13 K/UL (ref 0–0.2)
BASOPHILS NFR BLD: 1.3 % (ref 0–1.9)
BILIRUB SERPL-MCNC: 0.4 MG/DL (ref 0.1–1)
BUN SERPL-MCNC: 26 MG/DL (ref 8–23)
CALCIUM SERPL-MCNC: 8.8 MG/DL (ref 8.7–10.5)
CHLORIDE SERPL-SCNC: 105 MMOL/L (ref 95–110)
CO2 SERPL-SCNC: 29 MMOL/L (ref 23–29)
CREAT SERPL-MCNC: 0.9 MG/DL (ref 0.5–1.4)
DIFFERENTIAL METHOD: ABNORMAL
EOSINOPHIL # BLD AUTO: 0.2 K/UL (ref 0–0.5)
EOSINOPHIL NFR BLD: 1.8 % (ref 0–8)
ERYTHROCYTE [DISTWIDTH] IN BLOOD BY AUTOMATED COUNT: 15.5 % (ref 11.5–14.5)
EST. GFR  (AFRICAN AMERICAN): >60 ML/MIN/1.73 M^2
EST. GFR  (NON AFRICAN AMERICAN): >60 ML/MIN/1.73 M^2
GLUCOSE SERPL-MCNC: 84 MG/DL (ref 70–110)
HCT VFR BLD AUTO: 43.6 % (ref 40–54)
HGB BLD-MCNC: 14 G/DL (ref 14–18)
IMM GRANULOCYTES # BLD AUTO: 0.09 K/UL (ref 0–0.04)
IMM GRANULOCYTES NFR BLD AUTO: 0.9 % (ref 0–0.5)
LYMPHOCYTES # BLD AUTO: 1.1 K/UL (ref 1–4.8)
LYMPHOCYTES NFR BLD: 11.8 % (ref 18–48)
MCH RBC QN AUTO: 30.4 PG (ref 27–31)
MCHC RBC AUTO-ENTMCNC: 32.1 G/DL (ref 32–36)
MCV RBC AUTO: 95 FL (ref 82–98)
MONOCYTES # BLD AUTO: 0.9 K/UL (ref 0.3–1)
MONOCYTES NFR BLD: 9.4 % (ref 4–15)
NEUTROPHILS # BLD AUTO: 7.3 K/UL (ref 1.8–7.7)
NEUTROPHILS NFR BLD: 74.8 % (ref 38–73)
NRBC BLD-RTO: 0 /100 WBC
PLATELET # BLD AUTO: 185 K/UL (ref 150–350)
PMV BLD AUTO: 10.4 FL (ref 9.2–12.9)
POTASSIUM SERPL-SCNC: 5 MMOL/L (ref 3.5–5.1)
PROT SERPL-MCNC: 6.3 G/DL (ref 6–8.4)
RBC # BLD AUTO: 4.61 M/UL (ref 4.6–6.2)
SODIUM SERPL-SCNC: 139 MMOL/L (ref 136–145)
TSH SERPL DL<=0.005 MIU/L-ACNC: 2.22 UIU/ML (ref 0.4–4)
WBC # BLD AUTO: 9.69 K/UL (ref 3.9–12.7)

## 2021-01-25 PROCEDURE — 99214 PR OFFICE/OUTPT VISIT, EST, LEVL IV, 30-39 MIN: ICD-10-PCS | Mod: S$GLB,,, | Performed by: INTERNAL MEDICINE

## 2021-01-25 PROCEDURE — 84443 ASSAY THYROID STIM HORMONE: CPT

## 2021-01-25 PROCEDURE — 1101F PR PT FALLS ASSESS DOC 0-1 FALLS W/OUT INJ PAST YR: ICD-10-PCS | Mod: CPTII,S$GLB,, | Performed by: INTERNAL MEDICINE

## 2021-01-25 PROCEDURE — 85025 COMPLETE CBC W/AUTO DIFF WBC: CPT

## 2021-01-25 PROCEDURE — 36415 COLL VENOUS BLD VENIPUNCTURE: CPT

## 2021-01-25 PROCEDURE — 1101F PT FALLS ASSESS-DOCD LE1/YR: CPT | Mod: CPTII,S$GLB,, | Performed by: INTERNAL MEDICINE

## 2021-01-25 PROCEDURE — 3288F FALL RISK ASSESSMENT DOCD: CPT | Mod: CPTII,S$GLB,, | Performed by: INTERNAL MEDICINE

## 2021-01-25 PROCEDURE — 99999 PR PBB SHADOW E&M-EST. PATIENT-LVL III: ICD-10-PCS | Mod: PBBFAC,,, | Performed by: INTERNAL MEDICINE

## 2021-01-25 PROCEDURE — 1126F PR PAIN SEVERITY QUANTIFIED, NO PAIN PRESENT: ICD-10-PCS | Mod: S$GLB,,, | Performed by: INTERNAL MEDICINE

## 2021-01-25 PROCEDURE — 99499 UNLISTED E&M SERVICE: CPT | Mod: HCNC,S$GLB,, | Performed by: INTERNAL MEDICINE

## 2021-01-25 PROCEDURE — 80053 COMPREHEN METABOLIC PANEL: CPT

## 2021-01-25 PROCEDURE — 1159F PR MEDICATION LIST DOCUMENTED IN MEDICAL RECORD: ICD-10-PCS | Mod: S$GLB,,, | Performed by: INTERNAL MEDICINE

## 2021-01-25 PROCEDURE — 99999 PR PBB SHADOW E&M-EST. PATIENT-LVL III: CPT | Mod: PBBFAC,,, | Performed by: INTERNAL MEDICINE

## 2021-01-25 PROCEDURE — 3288F PR FALLS RISK ASSESSMENT DOCUMENTED: ICD-10-PCS | Mod: CPTII,S$GLB,, | Performed by: INTERNAL MEDICINE

## 2021-01-25 PROCEDURE — 99214 OFFICE O/P EST MOD 30 MIN: CPT | Mod: S$GLB,,, | Performed by: INTERNAL MEDICINE

## 2021-01-25 PROCEDURE — 1159F MED LIST DOCD IN RCRD: CPT | Mod: S$GLB,,, | Performed by: INTERNAL MEDICINE

## 2021-01-25 PROCEDURE — 1126F AMNT PAIN NOTED NONE PRSNT: CPT | Mod: S$GLB,,, | Performed by: INTERNAL MEDICINE

## 2021-01-25 PROCEDURE — 99499 RISK ADDL DX/OHS AUDIT: ICD-10-PCS | Mod: HCNC,S$GLB,, | Performed by: INTERNAL MEDICINE

## 2021-01-29 ENCOUNTER — PES CALL (OUTPATIENT)
Dept: ADMINISTRATIVE | Facility: CLINIC | Age: 86
End: 2021-01-29

## 2021-03-10 ENCOUNTER — TELEPHONE (OUTPATIENT)
Dept: INTERNAL MEDICINE | Facility: CLINIC | Age: 86
End: 2021-03-10

## 2021-03-22 ENCOUNTER — TELEPHONE (OUTPATIENT)
Dept: INTERNAL MEDICINE | Facility: CLINIC | Age: 86
End: 2021-03-22

## 2021-03-23 ENCOUNTER — OFFICE VISIT (OUTPATIENT)
Dept: INTERNAL MEDICINE | Facility: CLINIC | Age: 86
End: 2021-03-23
Payer: MEDICARE

## 2021-03-23 DIAGNOSIS — G47.09 OTHER INSOMNIA: Primary | ICD-10-CM

## 2021-03-23 PROCEDURE — 99442 PR PHYSICIAN TELEPHONE EVALUATION 11-20 MIN: ICD-10-PCS | Mod: 95,,, | Performed by: INTERNAL MEDICINE

## 2021-03-23 PROCEDURE — 99442 PR PHYSICIAN TELEPHONE EVALUATION 11-20 MIN: CPT | Mod: 95,,, | Performed by: INTERNAL MEDICINE

## 2021-03-23 RX ORDER — TRAZODONE HYDROCHLORIDE 50 MG/1
50 TABLET ORAL NIGHTLY
Qty: 30 TABLET | Refills: 1 | Status: SHIPPED | OUTPATIENT
Start: 2021-03-23 | End: 2021-04-07

## 2021-04-07 ENCOUNTER — TELEPHONE (OUTPATIENT)
Dept: INTERNAL MEDICINE | Facility: CLINIC | Age: 86
End: 2021-04-07

## 2021-04-07 RX ORDER — DOXEPIN HYDROCHLORIDE 10 MG/1
10 CAPSULE ORAL NIGHTLY
Qty: 15 CAPSULE | Refills: 0 | Status: SHIPPED | OUTPATIENT
Start: 2021-04-07 | End: 2021-04-19

## 2021-04-16 ENCOUNTER — TELEPHONE (OUTPATIENT)
Dept: INTERNAL MEDICINE | Facility: CLINIC | Age: 86
End: 2021-04-16

## 2021-04-19 RX ORDER — DOXEPIN HYDROCHLORIDE 10 MG/1
CAPSULE ORAL
Qty: 15 CAPSULE | Refills: 0 | Status: SHIPPED | OUTPATIENT
Start: 2021-04-19 | End: 2021-04-23 | Stop reason: SDUPTHER

## 2021-04-23 RX ORDER — DOXEPIN HYDROCHLORIDE 10 MG/1
CAPSULE ORAL
Qty: 15 CAPSULE | Refills: 0 | Status: SHIPPED | OUTPATIENT
Start: 2021-04-23 | End: 2021-05-05

## 2021-04-29 ENCOUNTER — OFFICE VISIT (OUTPATIENT)
Dept: INTERNAL MEDICINE | Facility: CLINIC | Age: 86
End: 2021-04-29
Payer: MEDICARE

## 2021-04-29 VITALS
OXYGEN SATURATION: 98 % | SYSTOLIC BLOOD PRESSURE: 110 MMHG | WEIGHT: 144.19 LBS | BODY MASS INDEX: 21.85 KG/M2 | HEIGHT: 68 IN | HEART RATE: 84 BPM | DIASTOLIC BLOOD PRESSURE: 60 MMHG

## 2021-04-29 DIAGNOSIS — K40.90 LEFT INGUINAL HERNIA: Primary | ICD-10-CM

## 2021-04-29 DIAGNOSIS — E03.9 HYPOTHYROIDISM, UNSPECIFIED TYPE: ICD-10-CM

## 2021-04-29 PROCEDURE — 3288F FALL RISK ASSESSMENT DOCD: CPT | Mod: CPTII,S$GLB,, | Performed by: INTERNAL MEDICINE

## 2021-04-29 PROCEDURE — 1159F PR MEDICATION LIST DOCUMENTED IN MEDICAL RECORD: ICD-10-PCS | Mod: S$GLB,,, | Performed by: INTERNAL MEDICINE

## 2021-04-29 PROCEDURE — 99214 OFFICE O/P EST MOD 30 MIN: CPT | Mod: S$GLB,,, | Performed by: INTERNAL MEDICINE

## 2021-04-29 PROCEDURE — 1126F AMNT PAIN NOTED NONE PRSNT: CPT | Mod: S$GLB,,, | Performed by: INTERNAL MEDICINE

## 2021-04-29 PROCEDURE — 1101F PR PT FALLS ASSESS DOC 0-1 FALLS W/OUT INJ PAST YR: ICD-10-PCS | Mod: CPTII,S$GLB,, | Performed by: INTERNAL MEDICINE

## 2021-04-29 PROCEDURE — 1126F PR PAIN SEVERITY QUANTIFIED, NO PAIN PRESENT: ICD-10-PCS | Mod: S$GLB,,, | Performed by: INTERNAL MEDICINE

## 2021-04-29 PROCEDURE — 3288F PR FALLS RISK ASSESSMENT DOCUMENTED: ICD-10-PCS | Mod: CPTII,S$GLB,, | Performed by: INTERNAL MEDICINE

## 2021-04-29 PROCEDURE — 99999 PR PBB SHADOW E&M-EST. PATIENT-LVL IV: CPT | Mod: PBBFAC,,, | Performed by: INTERNAL MEDICINE

## 2021-04-29 PROCEDURE — 1101F PT FALLS ASSESS-DOCD LE1/YR: CPT | Mod: CPTII,S$GLB,, | Performed by: INTERNAL MEDICINE

## 2021-04-29 PROCEDURE — 99499 UNLISTED E&M SERVICE: CPT | Mod: S$GLB,,, | Performed by: INTERNAL MEDICINE

## 2021-04-29 PROCEDURE — 1159F MED LIST DOCD IN RCRD: CPT | Mod: S$GLB,,, | Performed by: INTERNAL MEDICINE

## 2021-04-29 PROCEDURE — 99499 RISK ADDL DX/OHS AUDIT: ICD-10-PCS | Mod: S$GLB,,, | Performed by: INTERNAL MEDICINE

## 2021-04-29 PROCEDURE — 99999 PR PBB SHADOW E&M-EST. PATIENT-LVL IV: ICD-10-PCS | Mod: PBBFAC,,, | Performed by: INTERNAL MEDICINE

## 2021-04-29 PROCEDURE — 99214 PR OFFICE/OUTPT VISIT, EST, LEVL IV, 30-39 MIN: ICD-10-PCS | Mod: S$GLB,,, | Performed by: INTERNAL MEDICINE

## 2021-07-22 ENCOUNTER — TELEPHONE (OUTPATIENT)
Dept: INTERNAL MEDICINE | Facility: CLINIC | Age: 86
End: 2021-07-22

## 2021-07-22 RX ORDER — MIRTAZAPINE 15 MG/1
15 TABLET, FILM COATED ORAL NIGHTLY
Qty: 30 TABLET | Refills: 0 | Status: SHIPPED | OUTPATIENT
Start: 2021-07-22 | End: 2022-07-22

## 2021-08-10 ENCOUNTER — TELEPHONE (OUTPATIENT)
Dept: INTERNAL MEDICINE | Facility: CLINIC | Age: 86
End: 2021-08-10

## 2021-08-10 DIAGNOSIS — K62.89 RECTAL PAIN: Primary | ICD-10-CM

## 2021-08-12 ENCOUNTER — TELEPHONE (OUTPATIENT)
Dept: INTERNAL MEDICINE | Facility: CLINIC | Age: 86
End: 2021-08-12

## 2021-10-18 ENCOUNTER — TELEPHONE (OUTPATIENT)
Dept: INTERNAL MEDICINE | Facility: CLINIC | Age: 86
End: 2021-10-18

## 2021-10-26 ENCOUNTER — TELEPHONE (OUTPATIENT)
Dept: INTERNAL MEDICINE | Facility: CLINIC | Age: 86
End: 2021-10-26
Payer: MEDICARE

## 2021-10-26 ENCOUNTER — TELEPHONE (OUTPATIENT)
Dept: SURGERY | Facility: CLINIC | Age: 86
End: 2021-10-26
Payer: MEDICARE

## 2021-10-27 ENCOUNTER — OFFICE VISIT (OUTPATIENT)
Dept: SURGERY | Facility: OTHER | Age: 86
End: 2021-10-27
Attending: COLON & RECTAL SURGERY
Payer: MEDICARE

## 2021-10-27 VITALS
WEIGHT: 141.31 LBS | HEART RATE: 78 BPM | HEIGHT: 68 IN | SYSTOLIC BLOOD PRESSURE: 112 MMHG | DIASTOLIC BLOOD PRESSURE: 72 MMHG | BODY MASS INDEX: 21.42 KG/M2

## 2021-10-27 DIAGNOSIS — K62.89 RECTAL PAIN: Primary | ICD-10-CM

## 2021-10-27 PROCEDURE — 1125F PR PAIN SEVERITY QUANTIFIED, PAIN PRESENT: ICD-10-PCS | Mod: HCNC,CPTII,S$GLB, | Performed by: COLON & RECTAL SURGERY

## 2021-10-27 PROCEDURE — 3288F PR FALLS RISK ASSESSMENT DOCUMENTED: ICD-10-PCS | Mod: HCNC,CPTII,S$GLB, | Performed by: COLON & RECTAL SURGERY

## 2021-10-27 PROCEDURE — 99999 PR PBB SHADOW E&M-EST. PATIENT-LVL III: ICD-10-PCS | Mod: PBBFAC,HCNC,, | Performed by: COLON & RECTAL SURGERY

## 2021-10-27 PROCEDURE — 46600 DIAGNOSTIC ANOSCOPY SPX: CPT | Mod: HCNC,S$GLB,, | Performed by: COLON & RECTAL SURGERY

## 2021-10-27 PROCEDURE — 1159F PR MEDICATION LIST DOCUMENTED IN MEDICAL RECORD: ICD-10-PCS | Mod: HCNC,CPTII,S$GLB, | Performed by: COLON & RECTAL SURGERY

## 2021-10-27 PROCEDURE — 99213 OFFICE O/P EST LOW 20 MIN: CPT | Mod: 25,HCNC,S$GLB, | Performed by: COLON & RECTAL SURGERY

## 2021-10-27 PROCEDURE — 1160F PR REVIEW ALL MEDS BY PRESCRIBER/CLIN PHARMACIST DOCUMENTED: ICD-10-PCS | Mod: HCNC,CPTII,S$GLB, | Performed by: COLON & RECTAL SURGERY

## 2021-10-27 PROCEDURE — 99213 PR OFFICE/OUTPT VISIT, EST, LEVL III, 20-29 MIN: ICD-10-PCS | Mod: 25,HCNC,S$GLB, | Performed by: COLON & RECTAL SURGERY

## 2021-10-27 PROCEDURE — 99999 PR PBB SHADOW E&M-EST. PATIENT-LVL III: CPT | Mod: PBBFAC,HCNC,, | Performed by: COLON & RECTAL SURGERY

## 2021-10-27 PROCEDURE — 1159F MED LIST DOCD IN RCRD: CPT | Mod: HCNC,CPTII,S$GLB, | Performed by: COLON & RECTAL SURGERY

## 2021-10-27 PROCEDURE — 3288F FALL RISK ASSESSMENT DOCD: CPT | Mod: HCNC,CPTII,S$GLB, | Performed by: COLON & RECTAL SURGERY

## 2021-10-27 PROCEDURE — 1160F RVW MEDS BY RX/DR IN RCRD: CPT | Mod: HCNC,CPTII,S$GLB, | Performed by: COLON & RECTAL SURGERY

## 2021-10-27 PROCEDURE — 1125F AMNT PAIN NOTED PAIN PRSNT: CPT | Mod: HCNC,CPTII,S$GLB, | Performed by: COLON & RECTAL SURGERY

## 2021-10-27 PROCEDURE — 1101F PT FALLS ASSESS-DOCD LE1/YR: CPT | Mod: HCNC,CPTII,S$GLB, | Performed by: COLON & RECTAL SURGERY

## 2021-10-27 PROCEDURE — 1101F PR PT FALLS ASSESS DOC 0-1 FALLS W/OUT INJ PAST YR: ICD-10-PCS | Mod: HCNC,CPTII,S$GLB, | Performed by: COLON & RECTAL SURGERY

## 2021-10-27 PROCEDURE — 46600 PR DIAG2STIC A2SCOPY: ICD-10-PCS | Mod: HCNC,S$GLB,, | Performed by: COLON & RECTAL SURGERY

## 2021-11-04 ENCOUNTER — OFFICE VISIT (OUTPATIENT)
Dept: INTERNAL MEDICINE | Facility: CLINIC | Age: 86
End: 2021-11-04
Payer: MEDICARE

## 2021-11-04 ENCOUNTER — IMMUNIZATION (OUTPATIENT)
Dept: INTERNAL MEDICINE | Facility: CLINIC | Age: 86
End: 2021-11-04
Payer: MEDICARE

## 2021-11-04 VITALS
OXYGEN SATURATION: 97 % | SYSTOLIC BLOOD PRESSURE: 128 MMHG | BODY MASS INDEX: 21.52 KG/M2 | HEIGHT: 68 IN | HEART RATE: 76 BPM | DIASTOLIC BLOOD PRESSURE: 72 MMHG | WEIGHT: 142 LBS

## 2021-11-04 DIAGNOSIS — G47.00 INSOMNIA, UNSPECIFIED TYPE: Primary | ICD-10-CM

## 2021-11-04 DIAGNOSIS — Z23 NEED FOR VACCINATION: Primary | ICD-10-CM

## 2021-11-04 PROCEDURE — 1126F AMNT PAIN NOTED NONE PRSNT: CPT | Mod: HCNC,CPTII,S$GLB, | Performed by: INTERNAL MEDICINE

## 2021-11-04 PROCEDURE — 99999 PR PBB SHADOW E&M-EST. PATIENT-LVL III: ICD-10-PCS | Mod: PBBFAC,HCNC,, | Performed by: INTERNAL MEDICINE

## 2021-11-04 PROCEDURE — 1159F PR MEDICATION LIST DOCUMENTED IN MEDICAL RECORD: ICD-10-PCS | Mod: HCNC,CPTII,S$GLB, | Performed by: INTERNAL MEDICINE

## 2021-11-04 PROCEDURE — 99213 OFFICE O/P EST LOW 20 MIN: CPT | Mod: HCNC,S$GLB,, | Performed by: INTERNAL MEDICINE

## 2021-11-04 PROCEDURE — 1126F PR PAIN SEVERITY QUANTIFIED, NO PAIN PRESENT: ICD-10-PCS | Mod: HCNC,CPTII,S$GLB, | Performed by: INTERNAL MEDICINE

## 2021-11-04 PROCEDURE — 3288F FALL RISK ASSESSMENT DOCD: CPT | Mod: HCNC,CPTII,S$GLB, | Performed by: INTERNAL MEDICINE

## 2021-11-04 PROCEDURE — 91303 COVID-19,VECTOR-NR,RS-AD26,PF,0.5 ML DOSE VACCINE (JANSSEN): CPT | Mod: HCNC,PBBFAC | Performed by: INTERNAL MEDICINE

## 2021-11-04 PROCEDURE — 0034A COVID-19,VECTOR-NR,RS-AD26,PF,0.5 ML DOSE VACCINE (JANSSEN): CPT | Mod: HCNC,CV19,PBBFAC | Performed by: INTERNAL MEDICINE

## 2021-11-04 PROCEDURE — 3288F PR FALLS RISK ASSESSMENT DOCUMENTED: ICD-10-PCS | Mod: HCNC,CPTII,S$GLB, | Performed by: INTERNAL MEDICINE

## 2021-11-04 PROCEDURE — 1101F PT FALLS ASSESS-DOCD LE1/YR: CPT | Mod: HCNC,CPTII,S$GLB, | Performed by: INTERNAL MEDICINE

## 2021-11-04 PROCEDURE — 99999 PR PBB SHADOW E&M-EST. PATIENT-LVL III: CPT | Mod: PBBFAC,HCNC,, | Performed by: INTERNAL MEDICINE

## 2021-11-04 PROCEDURE — 1101F PR PT FALLS ASSESS DOC 0-1 FALLS W/OUT INJ PAST YR: ICD-10-PCS | Mod: HCNC,CPTII,S$GLB, | Performed by: INTERNAL MEDICINE

## 2021-11-04 PROCEDURE — 99213 PR OFFICE/OUTPT VISIT, EST, LEVL III, 20-29 MIN: ICD-10-PCS | Mod: HCNC,S$GLB,, | Performed by: INTERNAL MEDICINE

## 2021-11-04 PROCEDURE — 1159F MED LIST DOCD IN RCRD: CPT | Mod: HCNC,CPTII,S$GLB, | Performed by: INTERNAL MEDICINE

## 2021-11-04 RX ORDER — ESZOPICLONE 1 MG/1
1 TABLET, FILM COATED ORAL NIGHTLY
Qty: 30 TABLET | Refills: 0 | Status: SHIPPED | OUTPATIENT
Start: 2021-11-04 | End: 2021-12-04

## 2021-11-22 ENCOUNTER — TELEPHONE (OUTPATIENT)
Dept: INTERNAL MEDICINE | Facility: CLINIC | Age: 86
End: 2021-11-22
Payer: MEDICARE

## 2021-11-22 DIAGNOSIS — K40.90 INGUINAL HERNIA WITHOUT OBSTRUCTION OR GANGRENE, RECURRENCE NOT SPECIFIED, UNSPECIFIED LATERALITY: Primary | ICD-10-CM

## 2022-02-28 ENCOUNTER — HOSPITAL ENCOUNTER (EMERGENCY)
Facility: OTHER | Age: 87
Discharge: HOME | End: 2022-02-28
Attending: EMERGENCY MEDICINE
Payer: MEDICARE

## 2022-02-28 VITALS
SYSTOLIC BLOOD PRESSURE: 121 MMHG | OXYGEN SATURATION: 97 % | TEMPERATURE: 98 F | RESPIRATION RATE: 18 BRPM | BODY MASS INDEX: 23.57 KG/M2 | DIASTOLIC BLOOD PRESSURE: 72 MMHG | HEART RATE: 79 BPM | WEIGHT: 155 LBS

## 2022-02-28 DIAGNOSIS — S09.93XA FACIAL INJURY, INITIAL ENCOUNTER: ICD-10-CM

## 2022-02-28 DIAGNOSIS — S61.412A LACERATION OF LEFT HAND WITHOUT FOREIGN BODY, INITIAL ENCOUNTER: Primary | ICD-10-CM
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
## 2022-02-28 PROCEDURE — 25000003 PHARM REV CODE 250: Performed by: NURSE PRACTITIONER

## 2022-02-28 PROCEDURE — 90471 IMMUNIZATION ADMIN: CPT | Performed by: NURSE PRACTITIONER

## 2022-02-28 PROCEDURE — 63600175 PHARM REV CODE 636 W HCPCS: Performed by: NURSE PRACTITIONER

## 2022-02-28 PROCEDURE — 12002 RPR S/N/AX/GEN/TRNK2.6-7.5CM: CPT

## 2022-02-28 PROCEDURE — 99284 EMERGENCY DEPT VISIT MOD MDM: CPT | Mod: 25

## 2022-02-28 PROCEDURE — 90715 TDAP VACCINE 7 YRS/> IM: CPT | Performed by: NURSE PRACTITIONER

## 2022-02-28 PROCEDURE — 12011 RPR F/E/E/N/L/M 2.5 CM/<: CPT

## 2022-02-28 RX ORDER — LIDOCAINE HYDROCHLORIDE 20 MG/ML
10 INJECTION, SOLUTION INFILTRATION; PERINEURAL
Status: COMPLETED | OUTPATIENT
Start: 2022-02-28 | End: 2022-02-28

## 2022-02-28 RX ORDER — CEPHALEXIN 500 MG/1
500 CAPSULE ORAL 4 TIMES DAILY
Qty: 28 CAPSULE | Refills: 0 | Status: SHIPPED | OUTPATIENT
Start: 2022-02-28 | End: 2022-03-07

## 2022-02-28 RX ORDER — MUPIROCIN 20 MG/G
OINTMENT TOPICAL 3 TIMES DAILY
Qty: 15 G | Refills: 0 | Status: SHIPPED | OUTPATIENT
Start: 2022-02-28

## 2022-02-28 RX ADMIN — CLOSTRIDIUM TETANI TOXOID ANTIGEN (FORMALDEHYDE INACTIVATED), CORYNEBACTERIUM DIPHTHERIAE TOXOID ANTIGEN (FORMALDEHYDE INACTIVATED), BORDETELLA PERTUSSIS TOXOID ANTIGEN (GLUTARALDEHYDE INACTIVATED), BORDETELLA PERTUSSIS FILAMENTOUS HEMAGGLUTININ ANTIGEN (FORMALDEHYDE INACTIVATED), BORDETELLA PERTUSSIS PERTACTIN ANTIGEN, AND BORDETELLA PERTUSSIS FIMBRIAE 2/3 ANTIGEN 0.5 ML: 5; 2; 2.5; 5; 3; 5 INJECTION, SUSPENSION INTRAMUSCULAR at 12:02

## 2022-02-28 RX ADMIN — LIDOCAINE HYDROCHLORIDE 10 ML: 20 INJECTION, SOLUTION INFILTRATION; PERINEURAL at 12:02

## 2022-02-28 RX ADMIN — BACITRACIN, NEOMYCIN, POLYMYXIN B 1 EACH: 400; 3.5; 5 OINTMENT TOPICAL at 12:02

## 2022-02-28 NOTE — ED NOTES
89 year old male pt reports to ED today advising that he experienced a ground-level fall last night around 20:00 while walking on uneven sidewalk. Pt reports that he does not take any blood thinners and is not in any pain at this time. Pt has swelling and bruising to left periorbital region and approx 2 in long laceration to left hand. No active bleeding noted, pt denies any LOC/dizziness/nausea/vomiting prior to or following incident. Pt is aox4, abc's intact. Pt is awake and able to answer questions fully, laying comfortably in bed with wheels locked and bed in lowest position. Pt is connected to monitor with continuous pulse ox, will continue to monitor.

## 2022-02-28 NOTE — ED PROVIDER NOTES
Encounter Date: 2/28/2022       History     Chief Complaint   Patient presents with    Fall     Fall last night approx [id number] d/t uneven ground. Denies loc/bloodthinners/n/v. Sustained left orbital hematoma and bruising, left hand laceration     Chief complaint:  Hand laceration    89-year-old male presents for evaluation of left hand laceration and left-sided facial trauma after a fall last night at approximately 6:00 p.m. patient reports that he tripped on uneven pavement.  No loss of consciousness.  No blood thinners.  No visual changes.  No nausea or vomiting.  No headache or facial pain.  Denies any for pain medication.  Denies neck pain.  Denies back pain.  Superficial lacerations are present to the left face with scabbing.  Additional laceration noted to the medial right hand.  Remains with full range of motion of the hand.  Unsure of last tetanus shot.    This is the extent of patient's complaints for this ER encounter.     The history is provided by the patient.     Review of patient's allergies indicates:  No Known Allergies  Past Medical History:   Diagnosis Date    Allergic blepharitis 5/20/2014    Basal cell cancer     History of mononucleosis     pupura    History of osteopenia     Hypothyroidism     Melanoma     Osteopenia     Prostate hypertrophy     Thrombocytopenia 2002    Thrombocytopenia 2002    Thyroid disease     Thyroid nodule 07/2009    left nodule     Past Surgical History:   Procedure Laterality Date    CARPAL TUNNEL RELEASE      left    KNEE ARTHROSCOPY      KNEE SURGERY      Moh      surgery    ROTATOR CUFF REPAIR      right    SKIN CANCER EXCISION       Family History   Problem Relation Age of Onset    Cancer Other     Alcohol abuse Father     Prostate cancer Neg Hx     Kidney disease Neg Hx      Social History     Tobacco Use    Smoking status: Never Smoker    Smokeless tobacco: Never Used   Substance Use Topics    Alcohol use: Yes     Alcohol/week: 1.0  standard drink     Types: 1 Glasses of wine per week     Comment: ONCE DAILY    Drug use: No     Review of Systems   Constitutional: Negative for fever.   HENT: Positive for facial swelling (Hematoma to the left eye). Negative for congestion, rhinorrhea and sore throat.    Respiratory: Negative for cough and shortness of breath.    Cardiovascular: Negative for chest pain.   Gastrointestinal: Negative for abdominal pain, nausea and vomiting.   Genitourinary: Negative for difficulty urinating.   Musculoskeletal: Negative for arthralgias, back pain, myalgias and neck pain.   Skin: Positive for wound. Negative for rash.   Neurological: Negative for weakness and headaches.   Psychiatric/Behavioral: Negative.        Physical Exam     Initial Vitals [02/28/22 1055]   BP Pulse Resp Temp SpO2   (!) 126/58 75 18 97.8 °F (36.6 °C) 95 %      MAP       --         Physical Exam    Nursing note and vitals reviewed.  Constitutional: No distress.   HENT:   Head: Normocephalic. Head is with contusion and with laceration.       Nose: Nose normal.   Mouth/Throat: Oropharynx is clear and moist and mucous membranes are normal.   Eyes: Conjunctivae, EOM and lids are normal. Right pupil is round. Left pupil is round. Pupils are equal.   Neck: Neck supple.   Cardiovascular: Normal rate, regular rhythm and normal heart sounds.   Pulmonary/Chest: Effort normal and breath sounds normal. No respiratory distress.   Musculoskeletal:         General: Normal range of motion.      Left wrist: Normal.      Left hand: Laceration present. No swelling, deformity or tenderness. Normal range of motion. Normal strength. Normal capillary refill. Normal pulse.      Cervical back: Normal and neck supple.      Lumbar back: Normal.     Neurological: He is alert and oriented to person, place, and time. He has normal strength.   Skin: Skin is warm and dry. No rash noted.   Psychiatric: He has a normal mood and affect. His behavior is normal.                 ED  Course   Lac Repair    Date/Time: 2/28/2022 1:13 PM  Performed by: Shanda Gambino NP  Authorized by: Demetrio Duke MD     Consent:     Consent obtained:  Verbal    Consent given by:  Patient    Risks, benefits, and alternatives were discussed: yes      Risks discussed:  Infection    Alternatives discussed:  No treatment  Universal protocol:     Procedure explained and questions answered to patient or proxy's satisfaction: yes      Patient identity confirmed:  Verbally with patient  Anesthesia:     Anesthesia method:  Local infiltration    Local anesthetic:  Lidocaine 2% w/o epi  Laceration details:     Location:  Hand    Length (cm):  3  Pre-procedure details:     Preparation:  Patient was prepped and draped in usual sterile fashion  Exploration:     Contaminated: no    Treatment:     Area cleansed with:  Saline    Irrigation solution:  Sterile saline    Irrigation method:  Syringe  Skin repair:     Repair method:  Sutures    Suture size:  4-0    Suture technique:  Simple interrupted    Number of sutures:  2  Repair type:     Repair type:  Simple  Post-procedure details:     Dressing:  Antibiotic ointment and non-adherent dressing    Procedure completion:  Tolerated well, no immediate complications      Labs Reviewed - No data to display       Imaging Results    None          Medications   Tdap vaccine injection 0.5 mL (0.5 mLs Intramuscular Given 2/28/22 1219)   neomycin-bacitracnZn-polymyxnB packet 1 each (1 each Topical (Top) Given 2/28/22 1224)   LIDOcaine HCL 20 mg/ml (2%) injection 10 mL (10 mLs Infiltration Given by Other 2/28/22 1218)     Medical Decision Making:   Initial Assessment:   89-year-old male presents for evaluation of laceration to the left hand and facial injury status post fall last night at 6pm.  Patient denies loss of consciousness.  Denies blood thinners.  Denies headache or facial pain.  Denies hand pain.  No eye injury noted on exam.  No changes in vision.  Remains with  full  extraocular movement.  Remains with full range of motion of the left hand.  Denies neck pain or back pain.    Patient's main reason for visit is to evaluate wounds and determine if sutures are indicated.  Refusing imaging. He is refusing maxillofacial or head CT. Refusing x-ray of the left hand.  ED Management:  Patient is refusing imaging.  Patient is neurologically intact.  Capable of making medical decisions.  Patient denies need for pain medication.  Wound care provided per nursing staff.  Last tetanus shot 9 years ago.  Will be updated today.  Sutures placed to laceration on the left hand.  Skin adhesive glue used to repair superficial laceration x2 to the face and x1 left hand.   Antibiotics Keflex and Bactroban ointment will be prescribed at discharge.    Patient/caregiver voices understanding and feels comfortable with discharge plan.    The patient/caregiver acknowledges that close follow up with medical provider is required. Instructed to follow up with PCP within 7-10 days for suture removal. Patient/caregiver was given specific return precautions. The patient/caregiver agrees to comply with all instruction and directions given in the ER.     Other:   I have discussed this case with another health care provider.                      Clinical Impression:   Final diagnoses:  [S09.93XA] Facial injury, initial encounter  [S61.412A] Laceration of left hand without foreign body, initial encounter (Primary)  [W19.XXXA] Fall, initial encounter          ED Disposition Condition    Discharge Stable        ED Prescriptions     Medication Sig Dispense Start Date End Date Auth. Provider    cephALEXin (KEFLEX) 500 MG capsule Take 1 capsule (500 mg total) by mouth 4 (four) times daily. for 7 days 28 capsule 2/28/2022 3/7/2022 Shanda Gambino NP    mupirocin (BACTROBAN) 2 % ointment Apply topically 3 (three) times daily. 15 g 2/28/2022  Shanda Gambino NP        Follow-up Information     Follow up With Specialties  Details Why Contact Info    Rosalva Howard MD Internal Medicine Schedule an appointment as soon as possible for a visit in 2 days  [address]  [place] [address]  [phone]             Shanda Gambino NP  02/28/22 6925

## 2022-02-28 NOTE — DISCHARGE INSTRUCTIONS
**Follow up with PCP in 7-10 days for suture removal. Return to ER with worsening of symptoms.     **Over the counter Tylenol for pain as needed as directed on package insert. Drink plenty fluids. Get plenty rest. Wash hands frequently.     Wash area twice a day with antibacterial soap and water. Apply antibiotic ointment three times a day. Keep area clean. Take all antibiotics. Monitor for signs of infection such as redness, swelling, purulent discharge, or fever.

## 2022-08-01 ENCOUNTER — TELEPHONE (OUTPATIENT)
Dept: ORTHOPEDICS | Facility: CLINIC | Age: 87
End: 2022-08-01
Payer: MEDICARE

## 2022-08-01 DIAGNOSIS — M51.36 DDD (DEGENERATIVE DISC DISEASE), LUMBAR: Primary | ICD-10-CM

## 2022-08-01 NOTE — TELEPHONE ENCOUNTER
I called the patient today regarding his voice message. The patient is scheduled to see Dr. Mimi anand . The patient verbalized understanding and has no further questions.               ----- Message from Mariya Dhaliwal sent at 8/1/2022  2:59 PM CDT -----  Regarding: asking to be seen today, 8/1/22  Contact: PT @ [phone]  Pt is calling to speak to someone in Ortho. Pt is asking to be seen today, 8/1/22 but only wants to see an M.D. I offered to schedule pt with a P.A. and pt declined. Pt is having lower back pain, while standing. In alot of pain; past few weeks. Please call pt. Thanks.

## 2022-08-02 ENCOUNTER — TELEPHONE (OUTPATIENT)
Dept: ORTHOPEDICS | Facility: CLINIC | Age: 87
End: 2022-08-02
Payer: MEDICARE

## 2022-08-02 ENCOUNTER — OFFICE VISIT (OUTPATIENT)
Dept: ORTHOPEDICS | Facility: CLINIC | Age: 87
End: 2022-08-02
Payer: MEDICARE

## 2022-08-02 ENCOUNTER — HOSPITAL ENCOUNTER (OUTPATIENT)
Dept: RADIOLOGY | Facility: HOSPITAL | Age: 87
Discharge: HOME | End: 2022-08-02
Attending: ORTHOPAEDIC SURGERY
Payer: MEDICARE

## 2022-08-02 VITALS — BODY MASS INDEX: 21.48 KG/M2 | WEIGHT: 141.75 LBS | HEIGHT: 68 IN

## 2022-08-02 DIAGNOSIS — M43.10 DEGENERATIVE SPONDYLOLISTHESIS: ICD-10-CM

## 2022-08-02 DIAGNOSIS — S32.020A CLOSED COMPRESSION FRACTURE OF L2 VERTEBRA, INITIAL ENCOUNTER: ICD-10-CM

## 2022-08-02 DIAGNOSIS — M51.36 DDD (DEGENERATIVE DISC DISEASE), LUMBAR: ICD-10-CM

## 2022-08-02 DIAGNOSIS — M47.816 LUMBAR SPONDYLOSIS: Primary | ICD-10-CM

## 2022-08-02 DIAGNOSIS — S32.010A COMPRESSION FRACTURE OF L1 VERTEBRA, INITIAL ENCOUNTER: ICD-10-CM

## 2022-08-02 PROCEDURE — 1160F PR REVIEW ALL MEDS BY PRESCRIBER/CLIN PHARMACIST DOCUMENTED: ICD-10-PCS | Mod: CPTII,S$GLB,, | Performed by: ORTHOPAEDIC SURGERY

## 2022-08-02 PROCEDURE — 72110 X-RAY EXAM L-2 SPINE 4/>VWS: CPT | Mod: TC

## 2022-08-02 PROCEDURE — 99999 PR PBB SHADOW E&M-EST. PATIENT-LVL III: CPT | Mod: PBBFAC,,, | Performed by: ORTHOPAEDIC SURGERY

## 2022-08-02 PROCEDURE — 1160F RVW MEDS BY RX/DR IN RCRD: CPT | Mod: CPTII,S$GLB,, | Performed by: ORTHOPAEDIC SURGERY

## 2022-08-02 PROCEDURE — 99214 PR OFFICE/OUTPT VISIT, EST, LEVL IV, 30-39 MIN: ICD-10-PCS | Mod: GC,S$GLB,, | Performed by: ORTHOPAEDIC SURGERY

## 2022-08-02 PROCEDURE — 1159F MED LIST DOCD IN RCRD: CPT | Mod: CPTII,S$GLB,, | Performed by: ORTHOPAEDIC SURGERY

## 2022-08-02 PROCEDURE — 3288F FALL RISK ASSESSMENT DOCD: CPT | Mod: CPTII,S$GLB,, | Performed by: ORTHOPAEDIC SURGERY

## 2022-08-02 PROCEDURE — 1125F AMNT PAIN NOTED PAIN PRSNT: CPT | Mod: CPTII,S$GLB,, | Performed by: ORTHOPAEDIC SURGERY

## 2022-08-02 PROCEDURE — 1101F PR PT FALLS ASSESS DOC 0-1 FALLS W/OUT INJ PAST YR: ICD-10-PCS | Mod: CPTII,S$GLB,, | Performed by: ORTHOPAEDIC SURGERY

## 2022-08-02 PROCEDURE — 1159F PR MEDICATION LIST DOCUMENTED IN MEDICAL RECORD: ICD-10-PCS | Mod: CPTII,S$GLB,, | Performed by: ORTHOPAEDIC SURGERY

## 2022-08-02 PROCEDURE — 3288F PR FALLS RISK ASSESSMENT DOCUMENTED: ICD-10-PCS | Mod: CPTII,S$GLB,, | Performed by: ORTHOPAEDIC SURGERY

## 2022-08-02 PROCEDURE — 99214 OFFICE O/P EST MOD 30 MIN: CPT | Mod: GC,S$GLB,, | Performed by: ORTHOPAEDIC SURGERY

## 2022-08-02 PROCEDURE — 99999 PR PBB SHADOW E&M-EST. PATIENT-LVL III: ICD-10-PCS | Mod: PBBFAC,,, | Performed by: ORTHOPAEDIC SURGERY

## 2022-08-02 PROCEDURE — 1101F PT FALLS ASSESS-DOCD LE1/YR: CPT | Mod: CPTII,S$GLB,, | Performed by: ORTHOPAEDIC SURGERY

## 2022-08-02 PROCEDURE — 72110 X-RAY EXAM L-2 SPINE 4/>VWS: CPT | Mod: 26,,, | Performed by: RADIOLOGY

## 2022-08-02 PROCEDURE — 1125F PR PAIN SEVERITY QUANTIFIED, PAIN PRESENT: ICD-10-PCS | Mod: CPTII,S$GLB,, | Performed by: ORTHOPAEDIC SURGERY

## 2022-08-02 PROCEDURE — 72110 XR LUMBAR SPINE AP AND LAT WITH FLEX/EXT: ICD-10-PCS | Mod: 26,,, | Performed by: RADIOLOGY

## 2022-08-02 NOTE — PROGRESS NOTES
DATE: 8/2/2022  PATIENT: Lavell Coyle    Attending Physician: Carrillo Le M.D.    CHIEF COMPLAINT: LBP    HISTORY:  Lavell Coyle is a 89 y.o. male presents for initial evaluation of low back pain (Back - 7). The pain has been present for 3 weeks. The patient describes the pain as dull, achey.  The pain is worse with walking and improved by sitting and lying down. There is no associated numbness and tingling. There is no subjective weakness. Prior treatments have not included MCKAYLA, medications, PT, or spine/neck surgery.    The Patient denies myelopathic symptoms such as handwriting changes or difficulty with buttons/coins/keys. Denies perineal paresthesias, bowel/bladder dysfunction.    The patient does not smoke, have DM or endorse IVDU. The patient is not on any blood thinners and does not take chronic narcotics. He is a retired dentist.    PAST MEDICAL/SURGICAL HISTORY:  Past Medical History:   Diagnosis Date    Allergic blepharitis 5/20/2014    Basal cell cancer     History of mononucleosis     pupura    History of osteopenia     Hypothyroidism     Melanoma     Osteopenia     Prostate hypertrophy     Thrombocytopenia 2002    Thrombocytopenia 2002    Thyroid disease     Thyroid nodule 07/2009    left nodule     Past Surgical History:   Procedure Laterality Date    CARPAL TUNNEL RELEASE      left    KNEE ARTHROSCOPY      KNEE SURGERY      Moh      surgery    ROTATOR CUFF REPAIR      right    SKIN CANCER EXCISION         Current Medications:   Current Outpatient Medications:     calcium citrate-vitamin D3 315-200 mg (CITRACAL+D) 315-200 mg-unit per tablet, Take 1 tablet by mouth once daily., Disp: , Rfl:     diclofenac sodium (VOLTAREN) 1 % Gel, Apply 2 g topically 4 (four) times daily., Disp: 1 Tube, Rfl: 2    ibuprofen (ADVIL,MOTRIN) 200 MG tablet, Take 200 mg by mouth every 6 (six) hours as needed for Pain., Disp: , Rfl:     levothyroxine (SYNTHROID) 50 MCG tablet, TAKE 1 TABLET(50  "MCG) BY MOUTH EVERY DAY, Disp: 90 tablet, Rfl: 3    MULTIVITAMIN ORAL, Take by mouth., Disp: , Rfl:     mupirocin (BACTROBAN) 2 % ointment, Apply topically 3 (three) times daily., Disp: 15 g, Rfl: 0    OMEGA-3/DHA/EPA/FISH OIL (OMEGA-3 ORAL), Take by mouth., Disp: , Rfl:     saw palmetto 160 MG capsule, Take 160 mg by mouth 2 (two) times daily., Disp: , Rfl:     mirtazapine (REMERON) 15 MG tablet, Take 1 tablet (15 mg total) by mouth every evening. (Patient not taking: Reported on 11/4/2021), Disp: 30 tablet, Rfl: 0    Social History:   Social History     Socioeconomic History    Marital status:    Occupational History    Occupation: dentist, peridontist.   Tobacco Use    Smoking status: Never Smoker    Smokeless tobacco: Never Used   Substance and Sexual Activity    Alcohol use: Yes     Alcohol/week: 1.0 standard drink     Types: 1 Glasses of wine per week     Comment: ONCE DAILY    Drug use: No    Sexual activity: Not Currently       REVIEW OF SYSTEMS:  Constitution: Negative. Negative for chills, fever and night sweats.   Cardiovascular: Negative for chest pain and syncope.   Respiratory: Negative for cough and shortness of breath.   Gastrointestinal: See HPI. Negative for nausea/vomiting. Negative for abdominal pain.  Genitourinary: See HPI. Negative for discoloration or dysuria.  Skin: Negative for dry skin, itching and rash.   Hematologic/Lymphatic: No for bleeding/clotting disorders.   Musculoskeletal: Negative for falls and muscle weakness.   Neurological: See HPI. No history of seizures. No history of cranial surgery or shunts.  Endocrine: Negative for polydipsia, polyphagia and polyuria.   Allergic/Immunologic: Negative for hives and persistent infections.    PHYSICAL EXAMINATION:    Ht 5' 8" (1.727 m)   Wt 64.3 kg (141 lb 12.1 oz)   BMI 21.55 kg/m²     General: The patient is a comfortable 89 y.o. male in no apparent distress, the patient is orientatied to person, place and time. "   Psych: Normal mood and affect  HEENT: Vision grossly intact, hearing intact to the spoken word.  Lungs: Respirations unlabored.  Gait: Normal station and gait, no difficulty with toe or heel walk.   Skin: Dorsal lumbar skin negative for rashes, lesions, hairy patches and surgical scars.  Range of motion: Lumbar range of motion is acceptable. There is no lumbar tenderness to palpation.  Spinal Balance: Global saggital and coronal spinal balance acceptable, no significant for scoliosis and kyphosis.  Musculoskeletal: No pain with the range of motion of the bilateral hips. No trochanteric tenderness to palpation.  Vascular: Bilateral lower extremities warm and well perfused, Dorsalis pedis pulses 2+ bilaterally.  Neurological: Normal strength and tone in all major motor groups in the bilateral lower extremities. Normal sensation to light touch in the L2-S1 dermatomes bilaterally.  Deep tendon reflexes symmetric and intact in the bilateral lower extremities.  Negative Babinski bilaterally.    IMAGING:   Today I independently reviewed the following images and my interpretations are as follows:    AP, Lat and Flex/Ex upright L-spine films demonstrate severe lumbar spondylosis and DDD. L1 and L2 VCFs. L4-5 anterolisthesis measuring 6mm.     Body mass index is 21.55 kg/m².  Hemoglobin A1C   Date Value Ref Range Status   03/09/2015 5.5 4.5 - 6.2 % Final         ASSESSMENT/PLAN:    Lavell was seen today for pain.    Diagnoses and all orders for this visit:    Lumbar spondylosis  -     Ambulatory referral/consult to Physical/Occupational Therapy; Future    Degenerative spondylolisthesis    DDD (degenerative disc disease), lumbar    Compression fracture of L1 vertebra, initial encounter    Closed compression fracture of L2 vertebra, initial encounter      Follow up if symptoms worsen or fail to improve.    Patient has lumbar spondylosis and degenerative spondylolisthesis. I discussed the natural history of their diagnoses as  well as surgical and nonsurgical treatment options. I educated the patient on the importance of core/back strengthening, correct posture, bending/lifting ergonomics, and low-impact aerobic exercises (walking, elliptical, and aquatherapy). I will refer the patient to physical therapy for core/back strengthening. Patient deferred medications. Patient will follow up PRN. Next step is a lumbar MRI.    I have personally examined the patient and agree with the above plan.    Carrillo Le MD  Orthopaedic Spine Surgeon  Department of Orthopaedic Surgery  [phone]

## 2022-08-02 NOTE — TELEPHONE ENCOUNTER
----- Message from Ilss Leon sent at 8/2/2022  8:59 AM CDT -----  Regarding: Pt called to r/s his lower back pain appts due to being ill and would like a call back today to r/s  Appointment Access Request:    Pt called to r/s his lower back pain appts due to being ill and would like a call back today to r/s    Pt can be reached at [phone]

## 2022-08-03 ENCOUNTER — CLINICAL SUPPORT (OUTPATIENT)
Dept: REHABILITATION | Facility: OTHER | Age: 87
End: 2022-08-03
Attending: ORTHOPAEDIC SURGERY
Payer: MEDICARE

## 2022-08-03 DIAGNOSIS — R29.3 POSTURE IMBALANCE: ICD-10-CM

## 2022-08-03 DIAGNOSIS — G89.29 CHRONIC BILATERAL LOW BACK PAIN WITHOUT SCIATICA: ICD-10-CM

## 2022-08-03 DIAGNOSIS — M47.816 LUMBAR SPONDYLOSIS: ICD-10-CM

## 2022-08-03 DIAGNOSIS — M54.50 CHRONIC BILATERAL LOW BACK PAIN WITHOUT SCIATICA: ICD-10-CM

## 2022-08-03 PROCEDURE — 97162 PT EVAL MOD COMPLEX 30 MIN: CPT | Mod: PN | Performed by: PHYSICAL THERAPIST

## 2022-08-03 PROCEDURE — 97110 THERAPEUTIC EXERCISES: CPT | Mod: PN | Performed by: PHYSICAL THERAPIST

## 2022-08-03 NOTE — PLAN OF CARE
[facility]  Physical Therapy Initial Evaluation    Name: Lavell Coyle  Clinic Number: [id number]    Therapy Diagnosis:   Encounter Diagnosis   Name Primary?    Lumbar spondylosis      Physician: Carrillo Le MD    Physician Orders: PT Eval and Treat   Medical Diagnosis from Referral: M47.816 (ICD-10-CM) - Lumbar spondylosis   Evaluation Date: 8/3/2022  Authorization Period Expiration: 8/2/2023 (auth pending)  Plan of Care Expiration: 10/28/2022  Progress Note Due: 9/3/2022  Visit # / Visits authorized: 1/ 1 (eval only)   FOTO: 1/ 3: 8/3/2022     Precautions: Standard    Time In: 1520  Time Out: 1600  Total Appointment Time (timed & untimed codes): 40 minutes    Subjective   Date of onset: a few months  History of current condition - Lavell reports: insidious onset of pain to lower back. No pain with sitting or lying down. Pain with standing and particularly with walking. Denies saddle anaesthesia, change in b/b function, or falls.        Past Medical History:   Diagnosis Date    Allergic blepharitis 5/20/2014    Basal cell cancer     History of mononucleosis     pupura    History of osteopenia     Hypothyroidism     Melanoma     Osteopenia     Prostate hypertrophy     Thrombocytopenia 2002    Thrombocytopenia 2002    Thyroid disease     Thyroid nodule 07/2009    left nodule     Lavell Coyle  has a past surgical history that includes Moh; Skin cancer excision; Rotator cuff repair; Carpal tunnel release; Knee surgery; and Knee arthroscopy.    Lavell has a current medication list which includes the following prescription(s): calcium citrate-vitamin d3 315-200 mg, diclofenac sodium, ibuprofen, levothyroxine, mirtazapine, multivitamin, mupirocin, omega-3/dha/epa/fish oil, and saw palmetto.    Review of patient's allergies indicates:  No Known Allergies     Imaging, bone scan films: FINDINGS:  Degenerative changes seen in the lumbar spine.  There is a little mild loss of height  of the L1 and L2 vertebral bodies that may be old.  Prominent bony spurring can be seen.  The L4-L5 disc space is slightly narrowed with minimal forward subluxation of L4 on L5 which does not change in flexion and extension.  Degenerative changes seen around the facet joints.  Small calcification can be seen in the left kidney.      Prior Therapy: none for c/c   Social History: Pt lives alone in [facility] with 3 steps to enter  Occupation: retired  Prior Level of Function: I with ADL's and driving   Current Level of Function: I with ADL's and driving. Limited with walking. Modified standing tasks to be sitting as needed - uses roller chair    Pain:  Current 2/10, worst 7/10, best 2/10   Location: bilateral low back   Description: Aching  Aggravating Factors: Standing and Walking  Easing Factors: lying down, sitting    Pts goals: be able to return to walking (1/2 mile in [place]) without pain exacerbation     Objective     Observation: Pt is alert and oriented, good historian.     Posture:  FHP with increased thoracic kyphosis. Mild scoliosos noted to T/L spine with R concavity      Lumbar Range of Motion:    Percent WFL Pain   Flexion 75% (limited lordotic reversal)           Extension neutral           Left Side Bending 75%         Right Side Bending 75%         Left rotation   75%         Right Rotation   75%              Lower Extremity Strength  Right LE  Left LE    Ankle dorsiflexion: 5/5 Ankle dorsiflexion: 5/5   Knee extension: 4+/5 Knee extension: 4+/5   Knee flexion: 4+/5 Knee flexion: 4+/5   Hip flexion: 4-/5 Hip flexion: 4/5   Hip external rotation: 4+/5 Hip external rotation: 4+/5   Hip internal rotation: 4-/5 Hip internal rotation: 4-/5   Hip extension:  3+/5 Hip extension: 3+/5   Hip abduction: 4-/5 Hip abduction: 4-/5   Hip adduction: 4+/5 Hip adduction 4+/5         Neuro Dynamic Testing:    Sciatic nerve:      SLR: R = -     L = -          Joint Mobility: gross stiffness noted with CPA to lumbar  "spine    Palpation: tenderness with CPA to TLJ, L1-2. Pt indicates lumbosacral spine as pain location, no tenderness on palpation    Sensation: grossly intact to light touch B LE    Flexibility:    Hamstring: R = WFL; L = WFL   Quad: R = mod; L = mod   Piriformis: R: WFL; L WFL           CMS Impairment/Limitation/Restriction for FOTO Lumbar Spine Survey    Therapist reviewed FOTO scores for Lavell Coyle on 8/3/2022.   FOTO documents entered into [employer] - see Media section.    Limitation Score: 40%    Goal: 32%         TREATMENT   Treatment Time In: 1550  Treatment Time Out: 1600  Total Treatment time separate from Evaluation: 10 minutes    Lavell received therapeutic exercises to develop strength, ROM, posture and core stabilization for 10 minutes including:  Reviewed and demonstrated for HEP:  TrA 5" x 20  PPT 5" x 20  Clams 5" x 20  DKTC 10" x 10  Scap squeezes 5" x 20      Home Exercises and Patient Education Provided    Education provided:   - Therapy rationale and plan of care    Written Home Exercises Provided: yes.  Exercises were reviewed and Lavell was able to demonstrate them prior to the end of the session.  Lavell demonstrated good  understanding of the education provided.     See EMR under Patient Instructions for exercises provided 8/3/2022.    Assessment   Lavell is a 89 y.o. male referred to outpatient Physical Therapy with a medical diagnosis of M47.816 (ICD-10-CM) - Lumbar spondylosis. Pt presents with s/s consistent with referring diagnosis. Postural deficits noted with increased thoracic kyphosis, mild scoliosis. Poor lordotic reversal noted with lumbar flexion in standing. Weakness to B hips noted with MMT. Flexibility deficits noted to B quads. Pt reports marked functional limitations in tolerance to standing and walking.     Pt prognosis is Good.   Pt will benefit from skilled outpatient Physical Therapy to address the deficits stated above and in the chart below, provide pt/family education, and " to maximize pt's level of independence.     Plan of care discussed with patient: Yes  Pt's spiritual, cultural and educational needs considered and patient is agreeable to the plan of care and goals as stated below:     Anticipated Barriers for therapy: standard    Medical Necessity is demonstrated by the following  History  Co-morbidities and personal factors that may impact the plan of care Co-morbidities:   advanced age, history of CVA and osteoporosis    Personal Factors:   age  lifestyle     moderate   Examination  Body Structures and Functions, activity limitations and participation restrictions that may impact the plan of care Body Regions:   back  lower extremities    Body Systems:    gross symmetry  ROM  strength  balance  gait  transfers  transitions  motor control  motor learning    Participation Restrictions:   Standing, walking    Activity limitations:   Learning and applying knowledge  no deficits    General Tasks and Commands  no deficits    Communication  no deficits    Mobility  walking    Self care  no deficits    Domestic Life  shopping  doing house work (cleaning house, washing dishes, laundry)    Interactions/Relationships  no deficits    Life Areas  no deficits    Community and Social Life  community life  recreation and leisure         moderate   Clinical Presentation evolving clinical presentation with changing clinical characteristics moderate   Decision Making/ Complexity Score: moderate     Goals:  Short Term Goals (4 Weeks):   1. Pt will report 20% reduction in pain of the lumbar spine for ease with ADL's.  2. PT will demonstrate improved upright posture with minimal cuing for ease with functional positioning in home and community.  3. Pt will demonstrate improved lumbar spine ROM in all directions by 10% for ease with bending activities.   4. Pt to demonstrate improved functional ability with FOTO limitation <=38% disability.    Long Term Goals (12 Weeks):   1. Pt will report being  independent with [facility] for maintenance of improvements gained during therapy sessions  2. PT will report 50% reduction of pain of the back for ease with standing tasks in home and community.   3. Pt will demonstrate trunk and extremity strength to >=4+/5 without the provocation of pain for ease with walking in home and community  4. Pt will demonstrate appropriate upright posture without external cueing for ease with return to ambulation in [facility].   5. Pt to demonstrate improved functional ability with FOTO limitation <=32% disability.    Plan   Plan of care Certification: 8/3/2022 to 10/28/2022.    Outpatient Physical Therapy 2 times weekly for 12 weeks to include the following interventions: Aquatic Therapy, Manual Therapy, Moist Heat/ Ice, Neuromuscular Re-ed, Patient Education, Self Care, Therapeutic Activities and Therapeutic Exercise.     Therese Lobo, PT

## 2022-08-09 ENCOUNTER — DOCUMENTATION ONLY (OUTPATIENT)
Dept: REHABILITATION | Facility: OTHER | Age: 87
End: 2022-08-09
Payer: MEDICARE

## 2022-08-09 NOTE — PROGRESS NOTES
Physical Therapy Treatment Note    Patient was scheduled for physical therapy at [facility] for 8/9/2022. Pt failed to appear for appointment without prior notification for today.     Pt was rescheduled for 8/11/2022 and educated on cancel/no-show policy.      Thank you for your referral.    Sincerely,    Rosie Banuelos, PT

## 2022-08-11 ENCOUNTER — DOCUMENTATION ONLY (OUTPATIENT)
Dept: REHABILITATION | Facility: OTHER | Age: 87
End: 2022-08-11
Payer: MEDICARE

## 2022-08-11 NOTE — PROGRESS NOTES
Physical Therapy Treatment Note    Patient was scheduled for physical therapy at [facility] for 8/11/2022. Pt failed to appear for appointment without prior notification for today.     Cx: 0  NS: 2    Pt was rescheduled for 8/15/2022 and educated on cancel/no-show policy.      Thank you for your referral.    Sincerely,    Rosie Banuelos, PT

## 2022-08-12 ENCOUNTER — PATIENT MESSAGE (OUTPATIENT)
Dept: REHABILITATION | Facility: OTHER | Age: 87
End: 2022-08-12
Payer: MEDICARE

## 2022-08-30 ENCOUNTER — DOCUMENTATION ONLY (OUTPATIENT)
Dept: REHABILITATION | Facility: OTHER | Age: 87
End: 2022-08-30
Payer: MEDICARE

## 2022-08-30 NOTE — PROGRESS NOTES
Physical Therapy Treatment Note    Patient was scheduled for physical therapy at [facility] for 8/30/2022. Pt failed to appear for appointment without prior notification for today.     Cx: 0  NS: 3    Pt was rescheduled for 9/1/2022 and educated on cancel/no-show policy.      Thank you for your referral.    Sincerely,    Rosie Banuelos, PT

## 2022-09-02 ENCOUNTER — PATIENT MESSAGE (OUTPATIENT)
Dept: REHABILITATION | Facility: OTHER | Age: 87
End: 2022-09-02
Payer: MEDICARE

## 2022-09-21 ENCOUNTER — DOCUMENTATION ONLY (OUTPATIENT)
Dept: REHABILITATION | Facility: OTHER | Age: 87
End: 2022-09-21
Payer: MEDICARE

## 2022-09-21 DIAGNOSIS — R29.3 POSTURE IMBALANCE: ICD-10-CM

## 2022-09-21 DIAGNOSIS — G89.29 CHRONIC BILATERAL LOW BACK PAIN WITHOUT SCIATICA: Primary | ICD-10-CM

## 2022-09-21 DIAGNOSIS — M54.50 CHRONIC BILATERAL LOW BACK PAIN WITHOUT SCIATICA: Primary | ICD-10-CM

## 2022-09-21 NOTE — PROGRESS NOTES
[facility]    Discharge Note    Name: Lavell Coyle  Clinic Number: [id number]    Therapy Diagnosis:   Encounter Diagnoses   Name Primary?    Chronic bilateral low back pain without sciatica Yes    Posture imbalance      Physician: No ref. provider found    Physician Orders: PT Eval and Treat   Medical Diagnosis from Referral: M47.816 (ICD-10-CM) - Lumbar spondylosis   Evaluation Date: 8/3/2022      Date of Last visit: 8/3/2022  Total Visits Received: 1 (eval)    ASSESSMENT      Pt did not return to clinic following his evaluation.    Discharge reason: Patient has not attended therapy since 8/3/22.    Discharge FOTO Score: n/a    Goals: 0/9 met    This patient is discharged from Physical Therapy      Rosie Banuelos, PT

## 2023-10-04 NOTE — TELEPHONE ENCOUNTER
----- Message from Rochelle Guerin sent at 9/27/2019  6:40 PM CDT -----  Contact: self   [phone]  Pt is calling to be seen for severe back pain. Pt is requesting to come in Monday as UC if possible      Thank you!   musculoskeletal musculoskeletal